# Patient Record
Sex: FEMALE | Race: WHITE | NOT HISPANIC OR LATINO | Employment: OTHER | URBAN - METROPOLITAN AREA
[De-identification: names, ages, dates, MRNs, and addresses within clinical notes are randomized per-mention and may not be internally consistent; named-entity substitution may affect disease eponyms.]

---

## 2017-12-07 ENCOUNTER — OFFICE VISIT (OUTPATIENT)
Dept: OBGYN | Facility: CLINIC | Age: 27
End: 2017-12-07
Payer: COMMERCIAL

## 2017-12-07 VITALS — BODY MASS INDEX: 24.46 KG/M2 | DIASTOLIC BLOOD PRESSURE: 74 MMHG | WEIGHT: 147 LBS | SYSTOLIC BLOOD PRESSURE: 110 MMHG

## 2017-12-07 DIAGNOSIS — Z01.419 ENCOUNTER FOR GYNECOLOGICAL EXAMINATION WITHOUT ABNORMAL FINDING: ICD-10-CM

## 2017-12-07 DIAGNOSIS — Z30.41 ENCOUNTER FOR SURVEILLANCE OF CONTRACEPTIVE PILLS: ICD-10-CM

## 2017-12-07 DIAGNOSIS — Z13.6 CARDIOVASCULAR SCREENING; LDL GOAL LESS THAN 100: Primary | ICD-10-CM

## 2017-12-07 PROCEDURE — 99395 PREV VISIT EST AGE 18-39: CPT | Performed by: OBSTETRICS & GYNECOLOGY

## 2017-12-07 RX ORDER — NORGESTIMATE AND ETHINYL ESTRADIOL 0.25-0.035
1 KIT ORAL DAILY
Qty: 84 TABLET | Refills: 3 | Status: SHIPPED | OUTPATIENT
Start: 2017-12-07 | End: 2018-06-22

## 2017-12-07 NOTE — MR AVS SNAPSHOT
After Visit Summary   12/7/2017    Seth Null    MRN: [id number]           Patient Information     Date Of Birth          1990        Visit Information        Provider Department      12/7/2017 2:30 PM Chito Ahmadi MD [facility]        Today's Diagnoses     CARDIOVASCULAR SCREENING; LDL GOAL LESS THAN 100    -  1    Encounter for surveillance of contraceptive pills        Encounter for gynecological examination without abnormal finding          Care Instructions    You can reach your [facility] Care Team any time of the day by calling [phone]. This number will put you in touch with the 24 hour nurse line if the clinic is closed.    To contact your OB/GYN Surgery Scheduler please call [phone]. This is a direct number for your care team between 8 a.m. and 4 p.m. Monday through Friday.    [facility] is open for your convenience: [phone]  Monday through Friday 8 a.m. to 8:30 p.m.  Saturday 9 a.m. to 6 p.m.  Sunday Noon to 6 p.m.    They are closed on all major holidays.              Follow-ups after your visit        Follow-up notes from your care team     Return in about 1 year (around 12/7/2018).      Your next 10 appointments already scheduled     Feb 15, 2018  2:30 PM CST   MyChart Dermatology General with Clifford Bowles MD   [facility] ([facility])    [address] [place] [address] [id number] [phone]           This appointment is used for a first time dermatology visit only to determine the best course of treatment.  There is not a guarantee the provider will be able to perform any procedures on the first visit.                Future tests that were ordered for you today     Open Future Orders        Priority Expected Expires Ordered    Lipid panel reflex to direct LDL Fasting Routine 12/7/2017 12/7/2018 12/7/2017            Who to contact     If you have questions or need  follow up information about today's clinic visit or your schedule please contact [facility] directly at [phone].  Normal or non-critical lab and imaging results will be communicated to you by MyChart, letter or phone within 4 business days after the clinic has received the results. If you do not hear from us within 7 days, please contact the clinic through [employer] or phone. If you have a critical or abnormal lab result, we will notify you by phone as soon as possible.  Submit refill requests through Nitro or call your pharmacy and they will forward the refill request to us. Please allow 3 business days for your refill to be completed.          Additional Information About Your Visit        [employer] Information     Nitro gives you secure access to your electronic health record. If you see a primary care provider, you can also send messages to your care team and make appointments. If you have questions, please call your primary care clinic.  If you do not have a primary care provider, please call [phone] and they will assist you.        Care EveryWhere ID     This is your Care EveryWhere ID. This could be used by other organizations to access your [facility] medical records  [id number]        Your Vitals Were     Last Period BMI (Body Mass Index)                11/23/2017 (Approximate) 24.46 kg/m2           Blood Pressure from Last 3 Encounters:   12/07/17 110/74   10/11/16 120/70   09/01/15 116/68    Weight from Last 3 Encounters:   12/07/17 147 lb (66.7 kg)   10/11/16 156 lb 4.8 oz (70.9 kg)   09/01/15 152 lb (68.9 kg)                 Where to get your medicines      These medications were sent to [id number] - [place], [place] - [address]  [address], [facility] [address]     Phone:  [phone]     norgestimate-ethinyl estradiol 0.25-35 MG-MCG per tablet          Primary Care Provider Office Phone # Fax #    Chito Ahmadi [phone]        [address]  [facility] [address]        Equal Access to Services     LILIAM GUPTAJUNIOR : Hadii melinda marx hadtoñoo Somadanali, waaxda luqadaha, qaybta kashelbyda hieumarcosse, jordyn beasleyherminiadarrian fox. So [facility] [phone].    ATENCIÓN: Si habla español, tiene a sotelo disposición servicios gratuitos de asistencia lingüística. Llame al [phone].    We comply with applicable federal civil rights laws and [place] laws. We do not discriminate on the basis of race, color, national origin, age, disability, sex, sexual orientation, or gender identity.            Thank you!     Thank you for choosing [facility]  for your care. Our goal is always to provide you with excellent care. Hearing back from our patients is one way we can continue to improve our services. Please take a few minutes to complete the written survey that you may receive in the mail after your visit with us. Thank you!             Your Updated Medication List - Protect others around you: Learn how to safely use, store and throw away your medicines at www.disposemymeds.org.          This list is accurate as of: 12/7/17 11:59 PM.  Always use your most recent med list.                   Brand Name Dispense Instructions for use Diagnosis    norgestimate-ethinyl estradiol 0.25-35 MG-MCG per tablet    ORTHO-CYCLEN, SPRINTEC    84 tablet    Take 1 tablet by mouth daily    Encounter for surveillance of contraceptive pills

## 2017-12-07 NOTE — NURSING NOTE
"Chief Complaint   Patient presents with     Physical       Initial /74  Wt 147 lb (66.7 kg)  LMP 2017 (Approximate)  BMI 24.46 kg/m2 Estimated body mass index is 24.46 kg/(m^2) as calculated from the following:    Height as of 9/1/15: 5' 5\" (1.651 m).    Weight as of this encounter: 147 lb (66.7 kg).  BP completed using cuff size: regular        The following HM Due: NONE      The following patient reported/Care Every where data was sent to:  P ABSTRACT QUALITY INITIATIVES [[id number]]        Susana Rod, [facility]                 "

## 2017-12-08 NOTE — PATIENT INSTRUCTIONS
You can reach your [facility] Care Team any time of the day by calling [phone]. This number will put you in touch with the 24 hour nurse line if the clinic is closed.    To contact your OB/GYN Surgery Scheduler please call [phone]. This is a direct number for your care team between 8 a.m. and 4 p.m. Monday through Friday.    [facility] is open for your convenience: [phone]  Monday through Friday 8 a.m. to 8:30 p.m.  Saturday 9 a.m. to 6 p.m.  Sunday Noon to 6 p.m.    They are closed on all major holidays.

## 2017-12-08 NOTE — PROGRESS NOTES
HPI:  Seth Null is a 27 year old white  female , oral contraceptives for contraception who presents for an annual exam and pap.  She is Doing well without concerns. Prepregnancy planning including folate issues discussed  Self breast exam,  ACS screening mammogram recs, the use of 81 mg ASA to decrease the risk of heart disease, lipid screening, colon cancer screening recs and Dexa scan recs thoroughly reveiwed.      Past Medical History:   Diagnosis Date     NO ACTIVE PROBLEMS (aka NONE)      Past Surgical History:   Procedure Laterality Date     MAMMOPLASTY REDUCTION       Family History   Problem Relation Age of Onset     Hypertension Father      CANCER Maternal Grandfather      metastatic     Social History     Social History     Marital status:      Spouse name: N/A     Number of children: N/A     Years of education: N/A     Occupational History     special  w  [employer]     Social History Main Topics     Smoking status: Never Smoker     Smokeless tobacco: Never Used     Alcohol use Yes      Comment: some     Drug use: No     Sexual activity: Yes     Partners: Male     Birth control/ protection: Condom, Pill     Other Topics Concern     Parent/Sibling W/ Cabg, Mi Or Angioplasty Before 65f 55m? No     Social History Narrative       Allergies:  Review of patient's allergies indicates no known allergies.    Current Outpatient Prescriptions   Medication Sig Dispense Refill     norgestimate-ethinyl estradiol (ORTHO-CYCLEN, SPRINTEC) 0.25-35 MG-MCG per tablet Take 1 tablet by mouth daily 84 tablet 3       ROS: ROS: 10 point ROS neg other than the symptoms noted above in the HPI.    EXAM:  Vitals: /74  Wt 147 lb (66.7 kg)  LMP 2017 (Approximate)  BMI 24.46 kg/m2  BMI= Body mass index is 24.46 kg/(m^2).  Constitutional: healthy, alert and no distress  Head: Normocephalic. No masses, lesions, tenderness or abnormalities  Neck: Neck supple. No adenopathy.  Thyroid symmetric, normal size,, Carotids without bruits.  ENT: NEGATIVE for ear, mouth and throat problems  Breast:  breasts symmetric, no dominant or suspicious mass, no skin or nipple changes, no axillary adenopathy, unchanged from previous exam or self exam in taught and encouraged  Cardiovascular: negative, PMI normal. No lifts, heaves, or thrills. RRR. No murmurs, clicks gallops or rub  Respiratory: negative, Percussion normal. Good diaphragmatic excursion. Lungs clear  Gastrointestinal: Abdomen soft, non-tender. BS normal. No masses, organomegaly  Genitourinary: Pelvic Exam:  External Genitalia:     Normal appearance for age, no discharge present, no tenderness present, no inflammatory lesions present, color normal  Vagina:     Normal vaginal vault without central or paravaginal defects, no discharge present, no inflammatory lesions present, no masses present  Bladder:     Nontender to palpation  Urethra:   Urethral Body:  Urethra palpation normal, urethra structural support normal   Urethral Meatus:  No erythema or lesions present  Cervix:     Appearance healthy, no lesions present, nontender to palpation, no bleeding present  Uterus:     Uterus: firm, normal sized and nontender, midplane in position.   Adnexa:     No adnexal tenderness present, no adnexal masses present  Perineum:     Perineum within normal limits, no evidence of trauma, no rashes or skin lesions present  Anus:     Anus within normal limits, no hemorrhoids present  Inguinal Lymph Nodes:     No lymphadenopathy present  Pubic Hair:     Normal pubic hair distribution for age  Genitalia and Groin:     No rashes present, no lesions present, no areas of discoloration, no masses present    Musculoskeletalextremities normal- no gross deformities noted, gait normal and normal muscle tone  Integument: no suspicious lesions or rashes  Neurologic: Gait normal. Reflexes normal and symmetric. Sensation grossly WNL.  Psychiatric: mentation appears normal  and affect normal/bright  Hematologic/Lymphatic/Immunologic: Normal cervical lymph nodes     ASSESSMENT:/PLAN:  (Z13.6) CARDIOVASCULAR SCREENING; LDL GOAL LESS THAN 100  (primary encounter diagnosis)  Comment: Recommendations discussed  Plan: Lipid panel reflex to direct LDL Fasting        Future order placed    (Z30.41) Encounter for surveillance of contraceptive pills  Comment: patient requests refill, indications for backup discussed  Plan: norgestimate-ethinyl estradiol (ORTHO-CYCLEN,         SPRINTEC) 0.25-35 MG-MCG per tablet        done    (Z01.419) Encounter for gynecological examination without abnormal finding  Comment: otherwise normal GYN exam  Plan: return one year or when necessary concerns arise      Chito Ahmadi M.D.    (Chart documentation was completed, in part, with [employer] (formerly [employer]) voice-recognition software. Even though reviewed, some grammatical, spelling, and word errors may remain.)

## 2017-12-21 ENCOUNTER — OFFICE VISIT (OUTPATIENT)
Dept: FAMILY MEDICINE | Facility: CLINIC | Age: 27
End: 2017-12-21
Payer: COMMERCIAL

## 2017-12-21 ENCOUNTER — RADIANT APPOINTMENT (OUTPATIENT)
Dept: GENERAL RADIOLOGY | Facility: CLINIC | Age: 27
End: 2017-12-21
Attending: FAMILY MEDICINE
Payer: COMMERCIAL

## 2017-12-21 VITALS
BODY MASS INDEX: 23.66 KG/M2 | OXYGEN SATURATION: 100 % | HEIGHT: 65 IN | TEMPERATURE: 98.8 F | DIASTOLIC BLOOD PRESSURE: 88 MMHG | WEIGHT: 142 LBS | RESPIRATION RATE: 17 BRPM | SYSTOLIC BLOOD PRESSURE: 122 MMHG | HEART RATE: 95 BPM

## 2017-12-21 DIAGNOSIS — R06.02 SOB (SHORTNESS OF BREATH): ICD-10-CM

## 2017-12-21 DIAGNOSIS — J18.9 WALKING PNEUMONIA: Primary | ICD-10-CM

## 2017-12-21 PROCEDURE — 99203 OFFICE O/P NEW LOW 30 MIN: CPT | Performed by: FAMILY MEDICINE

## 2017-12-21 PROCEDURE — 71020 XR CHEST 2 VW: CPT

## 2017-12-21 RX ORDER — ALBUTEROL SULFATE 90 UG/1
2 AEROSOL, METERED RESPIRATORY (INHALATION) EVERY 4 HOURS PRN
Qty: 1 INHALER | Refills: 1 | Status: SHIPPED | OUTPATIENT
Start: 2017-12-21 | End: 2018-02-16

## 2017-12-21 RX ORDER — AZITHROMYCIN 250 MG/1
TABLET, FILM COATED ORAL
Qty: 6 TABLET | Refills: 0 | Status: SHIPPED | OUTPATIENT
Start: 2017-12-21 | End: 2018-01-30

## 2017-12-21 NOTE — PROGRESS NOTES
"  SUBJECTIVE:   Seth Null is a 27 year old female who presents to clinic today for the following health issues:      Reports dyspnea for the past 10 days, mild cough.     Having rhinorrhea and nasal congestion. No sinus pressure or ear pain.     No fevers or chills.     No hx of allergies, asthma, bronchospasm.     No chemical exposure. Non smoker.       Problem list and histories reviewed & adjusted, as indicated.  Additional history: as documented    There is no problem list on file for this patient.    Past Surgical History:   Procedure Laterality Date     MAMMOPLASTY REDUCTION         Social History   Substance Use Topics     Smoking status: Never Smoker     Smokeless tobacco: Never Used     Alcohol use Yes      Comment: some     Family History   Problem Relation Age of Onset     Hypertension Father      CANCER Maternal Grandfather      metastatic             Reviewed and updated as needed this visit by clinical staffAllergies       Reviewed and updated as needed this visit by Provider         ROS:  Constitutional, HEENT, cardiovascular, pulmonary, gi and gu systems are negative, except as otherwise noted.      OBJECTIVE:   /88 (BP Location: Right arm, Patient Position: Sitting, Cuff Size: Adult Regular)  Pulse 95  Temp 98.8  F (37.1  C) (Oral)  Resp 17  Ht 5' 5.25\" (1.657 m)  Wt 142 lb (64.4 kg)  LMP 11/23/2017 (Approximate)  SpO2 100%  Breastfeeding? No  BMI 23.45 kg/m2  Body mass index is 23.45 kg/(m^2).  GENERAL: healthy, alert and no distress  HENT: ear canals and TM's normal, nose and mouth without ulcers or lesions  NECK: no adenopathy, no asymmetry, masses, or scars and thyroid normal to palpation  RESP: lungs clear to auscultation - no rales, rhonchi or wheezes  CV: regular rate and rhythm, normal S1 S2, no S3 or S4, no murmur, click or rub, no peripheral edema and peripheral pulses strong    Diagnostic Test Results:  CXR - negative    ASSESSMENT/PLAN:     1. Walking pneumonia - " advised follow up should symptoms fail to resolve.   - azithromycin (ZITHROMAX) 250 MG tablet; Two tablets first day, then one tablet daily for four days.  Dispense: 6 tablet; Refill: 0  - albuterol (PROAIR HFA/PROVENTIL HFA/VENTOLIN HFA) 108 (90 BASE) MCG/ACT Inhaler; Inhale 2 puffs into the lungs every 4 hours as needed  Dispense: 1 Inhaler; Refill: 1    2. SOB (shortness of breath)  - XR Chest 2 Views; Future      Natasha Koch MD  [facility]

## 2017-12-21 NOTE — NURSING NOTE
"Chief Complaint   Patient presents with     Shortness of Breath       Initial /88 (BP Location: Right arm, Patient Position: Sitting, Cuff Size: Adult Regular)  Pulse 95  Temp 98.8  F (37.1  C) (Oral)  Resp 17  Ht 5' 5.25\" (1.657 m)  Wt 142 lb (64.4 kg)  LMP 11/23/2017 (Approximate)  SpO2 100%  Breastfeeding? No  BMI 23.45 kg/m2 Estimated body mass index is 23.45 kg/(m^2) as calculated from the following:    Height as of this encounter: 5' 5.25\" (1.657 m).    Weight as of this encounter: 142 lb (64.4 kg).  Medication Reconciliation: complete     Tom Rg CMA          "

## 2017-12-21 NOTE — PATIENT INSTRUCTIONS
Walking Pneumonia (Mycoplasma Pneumonia)  What is walking pneumonia?    Pneumonia is an infection of one or both of the lungs. It's usually caused by a virus, or bacteria. Walking pneumonia is caused by a specific bacteria called mycoplasma. Pneumonia can be very serious, especially in infants, young children, and older adults. And also in those with other long-term health problems or weak immune systems. In otherwise healthy adults, pneumonia can be mild. Mycoplasma pneumonia is a mild form and is often called walking pneumonia.  What are the symptoms of walking pneumonia?  The most common symptom is a dry, hacking cough. You may also feel tired.  Other symptoms may include:    Fever    Headaches    Chills    Sweating    Chest pain    Ear pain    Sore throat  How is walking pneumonia diagnosed?  Your healthcare provider will ask about your medical history and current symptoms. He or she will also examine you. You may also have tests including:    Imaging. A chest X-ray of your chest may be done.    Lab tests. Blood tests and sputum cultures may be done.  How is walking pneumonia treated?  Since it's caused by bacteria, antibiotics are usually prescribed. However, it may also go away without any treatment.  Your healthcare provider may also recommend medicines, either prescription or over-the-counter, and other measures to relieve symptoms such as:    Acetaminophen or ibuprofen to lower your fever and lessen headache or other pain    Cough medicine to loosen mucus or reduce coughing    Bedrest or reduced activity    Increased fluids to loosen mucus and replace lost fluids from sweating  Make sure you check with your healthcare provider or pharmacist before taking any over-the-counter medicines.  What are the complications of walking pneumonia?  Although walking pneumonia is usually mild, and it often goes away without treatment, complications can occur. They include:    Severe pneumonia    Serious infections in  other parts of the body    Anemia or a low red blood cell count    Kidney problems    Skin conditions  What can I do to prevent walking pneumonia?  To prevent others from getting walking pneumonia:    Try to stay away from other people if you are coughing a lot.    Cover your mouth when you cough. It's easy to pass along this infection to other people through coughing, and contact with surfaces that you cough near.    Wipe off surfaces, including phones, remote controls, and doorknobs with antibacterial or disinfectant products.    Tell people to wash their hands if they touch things you have coughed near.    Make sure to wash your hands often. Wash them before handling food or objects that others may touch. Use a separate towel or paper towels for drying.  Date Last Reviewed: 1/1/2017 2000-2017 [employer]. [address] [place] [address], [place] [address]. All rights reserved. This information is not intended as a substitute for professional medical care. Always follow your healthcare professional's instructions.

## 2017-12-21 NOTE — MR AVS SNAPSHOT
After Visit Summary   12/21/2017    Seth Null    MRN: [id number]           Patient Information     Date Of Birth          1990        Visit Information        Provider Department      12/21/2017 4:15 PM Natasha Koch MD [facility]        Today's Diagnoses     Walking pneumonia    -  1    SOB (shortness of breath)          Care Instructions      Walking Pneumonia (Mycoplasma Pneumonia)  What is walking pneumonia?    Pneumonia is an infection of one or both of the lungs. It's usually caused by a virus, or bacteria. Walking pneumonia is caused by a specific bacteria called mycoplasma. Pneumonia can be very serious, especially in infants, young children, and older adults. And also in those with other long-term health problems or weak immune systems. In otherwise healthy adults, pneumonia can be mild. Mycoplasma pneumonia is a mild form and is often called walking pneumonia.  What are the symptoms of walking pneumonia?  The most common symptom is a dry, hacking cough. You may also feel tired.  Other symptoms may include:    Fever    Headaches    Chills    Sweating    Chest pain    Ear pain    Sore throat  How is walking pneumonia diagnosed?  Your healthcare provider will ask about your medical history and current symptoms. He or she will also examine you. You may also have tests including:    Imaging. A chest X-ray of your chest may be done.    Lab tests. Blood tests and sputum cultures may be done.  How is walking pneumonia treated?  Since it's caused by bacteria, antibiotics are usually prescribed. However, it may also go away without any treatment.  Your healthcare provider may also recommend medicines, either prescription or over-the-counter, and other measures to relieve symptoms such as:    Acetaminophen or ibuprofen to lower your fever and lessen headache or other pain    Cough medicine to loosen mucus or reduce coughing    Bedrest or reduced activity    Increased  fluids to loosen mucus and replace lost fluids from sweating  Make sure you check with your healthcare provider or pharmacist before taking any over-the-counter medicines.  What are the complications of walking pneumonia?  Although walking pneumonia is usually mild, and it often goes away without treatment, complications can occur. They include:    Severe pneumonia    Serious infections in other parts of the body    Anemia or a low red blood cell count    Kidney problems    Skin conditions  What can I do to prevent walking pneumonia?  To prevent others from getting walking pneumonia:    Try to stay away from other people if you are coughing a lot.    Cover your mouth when you cough. It's easy to pass along this infection to other people through coughing, and contact with surfaces that you cough near.    Wipe off surfaces, including phones, remote controls, and doorknobs with antibacterial or disinfectant products.    Tell people to wash their hands if they touch things you have coughed near.    Make sure to wash your hands often. Wash them before handling food or objects that others may touch. Use a separate towel or paper towels for drying.  Date Last Reviewed: 1/1/2017 2000-2017 [employer]. [address] [place] [address]. All rights reserved. This information is not intended as a substitute for professional medical care. Always follow your healthcare professional's instructions.                Follow-ups after your visit        Your next 10 appointments already scheduled     Feb 15, 2018  2:30 PM CST   MyChart Dermatology General with Clifford Bowles MD   [facility]    [address] [address] [phone]           This appointment is used for a first time dermatology visit only to determine the best course of treatment.  There is not a guarantee the provider will be able to perform any  "procedures on the first visit.                Who to contact     If you have questions or need follow up information about today's clinic visit or your schedule please contact [facility] directly at [phone].  Normal or non-critical lab and imaging results will be communicated to you by MyChart, letter or phone within 4 business days after the clinic has received the results. If you do not hear from us within 7 days, please contact the clinic through SETVIhart or phone. If you have a critical or abnormal lab result, we will notify you by phone as soon as possible.  Submit refill requests through DearJane or call your pharmacy and they will forward the refill request to us. Please allow 3 business days for your refill to be completed.          Additional Information About Your Visit        SET[employer] Information     DearJane gives you secure access to your electronic health record. If you see a primary care provider, you can also send messages to your care team and make appointments. If you have questions, please call your primary care clinic.  If you do not have a primary care provider, please call [phone] and they will assist you.        Care EveryWhere ID     This is your Care EveryWhere ID. This could be used by other organizations to access your [place] medical records  [id number]        Your Vitals Were     Pulse Temperature Respirations Height Last Period Pulse Oximetry    95 98.8  F (37.1  C) (Oral) 17 5' 5.25\" (1.657 m) 11/23/2017 (Approximate) 100%    Breastfeeding? BMI (Body Mass Index)                No 23.45 kg/m2           Blood Pressure from Last 3 Encounters:   12/21/17 122/88   12/07/17 110/74   10/11/16 120/70    Weight from Last 3 Encounters:   12/21/17 142 lb (64.4 kg)   12/07/17 147 lb (66.7 kg)   10/11/16 156 lb 4.8 oz (70.9 kg)                 Today's Medication Changes          These changes are accurate as of: 12/21/17  4:48 PM.  If you have any questions, ask your nurse " or doctor.               Start taking these medicines.        Dose/Directions    albuterol 108 (90 BASE) MCG/ACT Inhaler   Commonly known as:  PROAIR HFA/PROVENTIL HFA/VENTOLIN HFA   Used for:  Walking pneumonia   Started by:  Natasha Koch MD        Dose:  2 puff   Inhale 2 puffs into the lungs every 4 hours as needed   Quantity:  1 Inhaler   Refills:  1       azithromycin 250 MG tablet   Commonly known as:  ZITHROMAX   Used for:  Walking pneumonia   Started by:  Natasha Koch MD        Two tablets first day, then one tablet daily for four days.   Quantity:  6 tablet   Refills:  0            Where to get your medicines      These medications were sent to [id number] - [place], [place] - [address]  [address] [facility], [facility] [address]     Phone:  [phone]     albuterol 108 (90 BASE) MCG/ACT Inhaler    azithromycin 250 MG tablet                Primary Care Provider Office Phone # Fax #    Chito Ahmadi [phone]       [address]  [facility] [address]        Equal Access to Services     [facility]: Hadii aad ku hadasho Soomaali, waaxda luqadaha, qaybta kaalmada adeegyada, waxay taurus torre . So [facility] [phone].    ATENCIÓN: Si habla español, tiene a sotelo disposición servicios gratuitos de asistencia lingüística. [facility] [phone].    We comply with applicable federal civil rights laws and [place] laws. We do not discriminate on the basis of race, color, national origin, age, disability, sex, sexual orientation, or gender identity.            Thank you!     Thank you for choosing [facility]  for your care. Our goal is always to provide you with excellent care. Hearing back from our patients is one way we can continue to improve our services. Please take a few minutes to complete the written survey that you may receive in the mail after your visit with us. Thank you!             Your Updated  Medication List - Protect others around you: Learn how to safely use, store and throw away your medicines at www.disposemymeds.org.          This list is accurate as of: 12/21/17  4:48 PM.  Always use your most recent med list.                   Brand Name Dispense Instructions for use Diagnosis    albuterol 108 (90 BASE) MCG/ACT Inhaler    PROAIR HFA/PROVENTIL HFA/VENTOLIN HFA    1 Inhaler    Inhale 2 puffs into the lungs every 4 hours as needed    Walking pneumonia       azithromycin 250 MG tablet    ZITHROMAX    6 tablet    Two tablets first day, then one tablet daily for four days.    Walking pneumonia       norgestimate-ethinyl estradiol 0.25-35 MG-MCG per tablet    ORTHO-CYCLEN, SPRINTEC    84 tablet    Take 1 tablet by mouth daily    Encounter for surveillance of contraceptive pills

## 2018-02-02 ENCOUNTER — TELEPHONE (OUTPATIENT)
Dept: FAMILY MEDICINE | Facility: CLINIC | Age: 28
End: 2018-02-02

## 2018-02-02 ENCOUNTER — OFFICE VISIT (OUTPATIENT)
Dept: FAMILY MEDICINE | Facility: CLINIC | Age: 28
End: 2018-02-02
Payer: COMMERCIAL

## 2018-02-02 VITALS
HEART RATE: 93 BPM | TEMPERATURE: 98.6 F | DIASTOLIC BLOOD PRESSURE: 78 MMHG | OXYGEN SATURATION: 99 % | HEIGHT: 65 IN | WEIGHT: 145 LBS | BODY MASS INDEX: 24.16 KG/M2 | SYSTOLIC BLOOD PRESSURE: 114 MMHG

## 2018-02-02 DIAGNOSIS — R06.00 DYSPNEA, UNSPECIFIED TYPE: Primary | ICD-10-CM

## 2018-02-02 DIAGNOSIS — J06.9 VIRAL URI WITH COUGH: ICD-10-CM

## 2018-02-02 DIAGNOSIS — R06.00 DYSPNEA, UNSPECIFIED TYPE: ICD-10-CM

## 2018-02-02 LAB
FEF 25/75: 3.09
FEV-1: 3.45
FEV1/FVC: NORMAL
FVC: 4.53

## 2018-02-02 PROCEDURE — 99214 OFFICE O/P EST MOD 30 MIN: CPT | Mod: 25 | Performed by: FAMILY MEDICINE

## 2018-02-02 PROCEDURE — 94010 BREATHING CAPACITY TEST: CPT | Performed by: FAMILY MEDICINE

## 2018-02-02 NOTE — NURSING NOTE
"Chief Complaint   Patient presents with     URI       Initial /78 (BP Location: Right arm, Patient Position: Chair, Cuff Size: Adult Regular)  Pulse 93  Temp 98.6  F (37  C) (Oral)  Ht 5' 5.25\" (1.657 m)  Wt 145 lb (65.8 kg)  SpO2 99%  Breastfeeding? No  BMI 23.94 kg/m2 Estimated body mass index is 23.94 kg/(m^2) as calculated from the following:    Height as of this encounter: 5' 5.25\" (1.657 m).    Weight as of this encounter: 145 lb (65.8 kg).  Medication Reconciliation: complete     Tom Rg CMA          "

## 2018-02-02 NOTE — PROGRESS NOTES
"  SUBJECTIVE:   Seth Null is a 27 year old female who presents to clinic today for the following health issues:      RESPIRATORY SYMPTOMS      Duration: one week    Description  cough and SOB    Severity: moderate    Accompanying signs and symptoms: None    History (predisposing factors):  Recent walking pneumonia    Precipitating or alleviating factors: worse at night    Therapies tried and outcome:  otc not helping      Having dyspnea, similar to when she came to see me in mid December. Was treated for walking PNA at that time, symptoms fully resolved. Then 1 week ago, symptoms returned but shortly following onset of cold symptoms - nasal congestion, sore throat, ear pressure and cough.     Albuterol not too helpful.     Breathing and cough worse overnight. No fevers.       No hx of asthma. Non smoker. Outside of past 2 months, no previous issues with breathing.         Problem list and histories reviewed & adjusted, as indicated.  Additional history: none    There is no problem list on file for this patient.    Past Surgical History:   Procedure Laterality Date     MAMMOPLASTY REDUCTION         Social History   Substance Use Topics     Smoking status: Never Smoker     Smokeless tobacco: Never Used     Alcohol use Yes      Comment: some     Family History   Problem Relation Age of Onset     Hypertension Father      CANCER Maternal Grandfather      metastatic           Reviewed and updated as needed this visit by clinical staff  Allergies       Reviewed and updated as needed this visit by Provider         ROS:  Constitutional, HEENT, cardiovascular, pulmonary, gi and gu systems are negative, except as otherwise noted.    OBJECTIVE:     /78 (BP Location: Right arm, Patient Position: Chair, Cuff Size: Adult Regular)  Pulse 93  Temp 98.6  F (37  C) (Oral)  Ht 5' 5.25\" (1.657 m)  Wt 145 lb (65.8 kg)  SpO2 99%  Breastfeeding? No  BMI 23.94 kg/m2  Body mass index is 23.94 kg/(m^2).  GENERAL: healthy, " alert and no distress  NECK: no adenopathy, no asymmetry, masses, or scars and thyroid normal to palpation  RESP: lungs clear to auscultation - no rales, rhonchi or wheezes  CV: regular rate and rhythm, normal S1 S2, no S3 or S4, no murmur, click or rub, no peripheral edema and peripheral pulses strong    Diagnostic Test Results:  Spirometry  FEV1 all > 100% predicted  FEV1/FVC > 89%      ASSESSMENT/PLAN:     1. Dyspnea, unspecified type - discussed likely still struggling with some inflammation from previous illness - now with this illness, suspect magnified her symptoms. Suggested adding an ICS to help calm the airways, call if persistent symptoms. Considered other etiologies including PE versus cardiac dysfunction - HR in NR, had full resolution of symptoms of symptoms in between illnesses, making both less likely.   - Spirometry, Breathing Capacity: Normal Order, Clinic Performed    2. Viral URI with cough     25 minutes spent with patient face-to-face, > 50% in counseling and coordination of care regarding the issues addressed in the assessment and plan.     Natasha Koch MD  [facility]

## 2018-02-02 NOTE — TELEPHONE ENCOUNTER
Ted hernandez [facility] is calling to clarify the script, states two puffs a day on the inhaler will only last patient 2 weeks. Please call [phone].

## 2018-02-02 NOTE — MR AVS SNAPSHOT
After Visit Summary   2/2/2018    Seth Null    MRN: [id number]           Patient Information     Date Of Birth          1990        Visit Information        Provider Department      2/2/2018 8:00 AM Natasha Koch MD [facility]        Today's Diagnoses     Dyspnea, unspecified type    -  1    Viral URI with cough           Follow-ups after your visit        Your next 10 appointments already scheduled     Feb 15, 2018  2:30 PM CST   Amy Dermatology General with Clifford Bowles MD   [facility] ([facility]    [address] [id number] [phone]           This appointment is used for a first time dermatology visit only to determine the best course of treatment.  There is not a guarantee the provider will be able to perform any procedures on the first visit.                Who to contact     If you have questions or need follow up information about today's clinic visit or your schedule please contact [facility] directly at [phone].  Normal or non-critical lab and imaging results will be communicated to you by MyChart, letter or phone within 4 business days after the clinic has received the results. If you do not hear from us within 7 days, please contact the clinic through [email or web address] or phone. If you have a critical or abnormal lab result, we will notify you by phone as soon as possible.  Submit refill requests through Qustreet or call your pharmacy and they will forward the refill request to us. Please allow 3 business days for your refill to be completed.          Additional Information About Your Visit        MyChart Information     Qustreet gives you secure access to your electronic health record. If you see a primary care provider, you can also send messages to your care team and make appointments. If you have questions, please call your primary care clinic.  If you  "do not have a primary care provider, please call [phone] and they will assist you.        Care EveryWhere ID     This is your Care EveryWhere ID. This could be used by other organizations to access your [place] medical records  [id number]        Your Vitals Were     Pulse Temperature Height Pulse Oximetry Breastfeeding? BMI (Body Mass Index)    93 98.6  F (37  C) (Oral) 5' 5.25\" (1.657 m) 99% No 23.94 kg/m2       Blood Pressure from Last 3 Encounters:   02/02/18 114/78   12/21/17 122/88   12/07/17 110/74    Weight from Last 3 Encounters:   02/02/18 145 lb (65.8 kg)   12/21/17 142 lb (64.4 kg)   12/07/17 147 lb (66.7 kg)              We Performed the Following     Spirometry, Breathing Capacity: Normal Order, Clinic Performed          Today's Medication Changes          These changes are accurate as of 2/2/18  9:07 AM.  If you have any questions, ask your nurse or doctor.               Start taking these medicines.        Dose/Directions    BUDESONIDE (INHALATION) 90 MCG/ACT Aepb   Used for:  Dyspnea, unspecified type   Started by:  Natasha Koch MD        Dose:  2 puff   Inhale 2 puffs into the lungs 2 times daily   Quantity:  1 each   Refills:  1            Where to get your medicines      These medications were sent to [id number] [place], [place] - [address] [facility]  [address] [facility] [address]     Phone:  [phone]     BUDESONIDE (INHALATION) 90 MCG/ACT Aepb                Primary Care Provider Office Phone # Fax #    Natasha Koch [phone] [address]  [facility] [address]        Equal Access to Services     JUANCHO GARIBAY : Sridevi Arias, wafelix voss, qadaylinta kaalmada lisa, jordyn fox. So [facility] [phone].    ATENCIÓN: Si habla español, tiene a sotelo disposición servicios gratuitos de asistencia lingüística. Geetha al [phone].    We comply with applicable federal civil rights " laws and [place] laws. We do not discriminate on the basis of race, color, national origin, age, disability, sex, sexual orientation, or gender identity.            Thank you!     Thank you for choosing [facility]  for your care. Our goal is always to provide you with excellent care. Hearing back from our patients is one way we can continue to improve our services. Please take a few minutes to complete the written survey that you may receive in the mail after your visit with us. Thank you!             Your Updated Medication List - Protect others around you: Learn how to safely use, store and throw away your medicines at www.disposemymeds.org.          This list is accurate as of 2/2/18  9:07 AM.  Always use your most recent med list.                   Brand Name Dispense Instructions for use Diagnosis    albuterol 108 (90 BASE) MCG/ACT Inhaler    PROAIR HFA/PROVENTIL HFA/VENTOLIN HFA    1 Inhaler    Inhale 2 puffs into the lungs every 4 hours as needed    Walking pneumonia       BUDESONIDE (INHALATION) 90 MCG/ACT Aepb     1 each    Inhale 2 puffs into the lungs 2 times daily    Dyspnea, unspecified type       norgestimate-ethinyl estradiol 0.25-35 MG-MCG per tablet    ORTHO-CYCLEN, SPRINTEC    84 tablet    Take 1 tablet by mouth daily    Encounter for surveillance of contraceptive pills

## 2018-02-15 ENCOUNTER — OFFICE VISIT (OUTPATIENT)
Dept: DERMATOLOGY | Facility: CLINIC | Age: 28
End: 2018-02-15
Payer: COMMERCIAL

## 2018-02-15 VITALS — HEART RATE: 97 BPM | DIASTOLIC BLOOD PRESSURE: 81 MMHG | OXYGEN SATURATION: 97 % | SYSTOLIC BLOOD PRESSURE: 119 MMHG

## 2018-02-15 DIAGNOSIS — L91.8 ST (SKIN TAG): Primary | ICD-10-CM

## 2018-02-15 PROCEDURE — 11100 HC BIOPSY SKIN/SUBQ/MUC MEM, SINGLE LESION: CPT | Performed by: DERMATOLOGY

## 2018-02-15 PROCEDURE — 88305 TISSUE EXAM BY PATHOLOGIST: CPT | Performed by: DERMATOLOGY

## 2018-02-15 PROCEDURE — 99203 OFFICE O/P NEW LOW 30 MIN: CPT | Mod: 25 | Performed by: DERMATOLOGY

## 2018-02-15 NOTE — MR AVS SNAPSHOT
After Visit Summary   2/15/2018    Seth Null    MRN: [id number]           Patient Information     Date Of Birth          1990        Visit Information        Provider Department      2/15/2018 2:30 PM Clifford Bowles MD [facility]        Today's Diagnoses     ST (skin tag)    -  1      Care Instructions          Wound Care Instructions     FOR SUPERFICIAL WOUNDS     [facility] [phone]    [facility] [phone]                       AFTER 24 HOURS YOU SHOULD REMOVE THE BANDAGE AND BEGIN DAILY DRESSING CHANGES AS FOLLOWS:     1) Remove Dressing.     2) Clean and dry the area with tap water using a Q-tip or sterile gauze pad.     3) Apply Vaseline, Aquaphor, Polysporin ointment or Bacitracin ointment over entire wound.  Do NOT use Neosporin ointment.     4) Cover the wound with a band-aid, or a sterile non-stick gauze pad and micropore paper tape      REPEAT THESE INSTRUCTIONS AT LEAST ONCE A DAY UNTIL THE WOUND HAS COMPLETELY HEALED.    It is an old wives tale that a wound heals better when it is exposed to air and allowed to dry out. The wound will heal faster with a better cosmetic result if it is kept moist with ointment and covered with a bandage.    **Do not let the wound dry out.**      Supplies Needed:      *Cotton tipped applicators (Q-tips)    *Polysporin Ointment or Bacitracin Ointment (NOT NEOSPORIN)    *Band-aids or non-stick gauze pads and micropore paper tape.      PATIENT INFORMATION:    During the healing process you will notice a number of changes. All wounds develop a small halo of redness surrounding the wound.  This means healing is occurring. Severe itching with extensive redness usually indicates sensitivity to the ointment or bandage tape used to dress the wound.  You should call our office if this develops.      Swelling  and/or discoloration around your surgical site is common, particularly when performed  around the eye.    All wounds normally drain.  The larger the wound the more drainage there will be.  After 7-10 days, you will notice the wound beginning to shrink and new skin will begin to grow.  The wound is healed when you can see skin has formed over the entire area.  A healed wound has a healthy, shiny look to the surface and is red to dark pink in color to normalize.  Wounds may take approximately 4-6 weeks to heal.  Larger wounds may take 6-8 weeks.  After the wound is healed you may discontinue dressing changes.    You may experience a sensation of tightness as your wound heals. This is normal and will gradually subside.    Your healed wound may be sensitive to temperature changes. This sensitivity improves with time, but if you re having a lot of discomfort, try to avoid temperature extremes.    Patients frequently experience itching after their wound appears to have healed because of the continue healing under the skin.  Plain Vaseline will help relieve the itching.        POSSIBLE COMPLICATIONS    BLEEDIN. Leave the bandage in place.  2. Use tightly rolled up gauze or a cloth to apply direct pressure over the bandage for 30  minutes.  3. Reapply pressure for an additional 30 minutes if necessary  4. Use additional gauze and tape to maintain pressure once the bleeding has stopped.            Follow-ups after your visit        Your next 10 appointments already scheduled     Feb 15, 2018  2:30 PM [facility] with Clifford Bowles MD   [facility]    [address] [id number] [phone]           This appointment is used for a first time dermatology visit only to determine the best course of treatment.  There is not a guarantee the provider will be able to perform any procedures on the first visit.              2018  7:40 AM CST   MyChart Short with Natasha Koch MD    [facility]    [address] [facility] [id number] [phone]              Who to contact     If you have questions or need follow up information about today's clinic visit or your schedule please contact [facility] directly at [phone].  Normal or non-critical lab and imaging results will be communicated to you by MyChart, letter or phone within 4 business days after the clinic has received the results. If you do not hear from us within 7 days, please contact the clinic through MyChart or phone. If you have a critical or abnormal lab result, we will notify you by phone as soon as possible.  Submit refill requests through [facility] or call your pharmacy and they will forward the refill request to us. Please allow 3 business days for your refill to be completed.          Additional Information About Your Visit        POTATOSOFThart Information     [facility] gives you secure access to your electronic health record. If you see a primary care provider, you can also send messages to your care team and make appointments. If you have questions, please call your primary care clinic.  If you do not have a primary care provider, please call [phone] and they will assist you.        Care EveryWhere ID     This is your Care EveryWhere ID. This could be used by other organizations to access your [facility] medical records  [id number]         Blood Pressure from Last 3 Encounters:   02/02/18 114/78   12/21/17 122/88   12/07/17 110/74    Weight from Last 3 Encounters:   02/02/18 65.8 kg (145 lb)   12/21/17 64.4 kg (142 lb)   12/07/17 66.7 kg (147 lb)              We Performed the Following     BIOPSY SKIN/SUBQ/MUC MEM, SINGLE LESION     Surgical pathology exam        Primary Care Provider Office Phone # Fax #    Natasha Koch [phone] [address] [facility] [address]        Equal Access to Services     LILIAM GARIBAY  AH: Hadii melinda elizabethlizbet Somadanali, waaxda luqadaha, qaybta kaaljayce gonzalez, jordyn taurus ceciliaaria hagen chris yelitza fox. So [facility] [phone].    ATENCIÓN: Si habla español, tiene a sotelo disposición servicios gratuitos de asistencia lingüística. Llame al [phone].    We comply with applicable federal civil rights laws and [place] laws. We do not discriminate on the basis of race, color, national origin, age, disability, sex, sexual orientation, or gender identity.            Thank you!     Thank you for choosing [facility]  for your care. Our goal is always to provide you with excellent care. Hearing back from our patients is one way we can continue to improve our services. Please take a few minutes to complete the written survey that you may receive in the mail after your visit with us. Thank you!             Your Updated Medication List - Protect others around you: Learn how to safely use, store and throw away your medicines at www.disposemymeds.org.          This list is accurate as of 2/15/18  2:26 PM.  Always use your most recent med list.                   Brand Name Dispense Instructions for use Diagnosis    albuterol 108 (90 BASE) MCG/ACT Inhaler    PROAIR HFA/PROVENTIL HFA/VENTOLIN HFA    1 Inhaler    Inhale 2 puffs into the lungs every 4 hours as needed    Walking pneumonia       budesonide 180 MCG/ACT inhaler    PULMICORT FLEXHALER    1 Inhaler    Inhale 2 puffs into the lungs 2 times daily    Dyspnea, unspecified type       norgestimate-ethinyl estradiol 0.25-35 MG-MCG per tablet    ORTHO-CYCLEN, SPRINTEC    84 tablet    Take 1 tablet by mouth daily    Encounter for surveillance of contraceptive pills

## 2018-02-15 NOTE — LETTER
2/15/2018         RE: Seth Null  [id number] [facility] [address]        Dear Colleague,    Thank you for referring your patient, Seth Null, to the [facility]. Please see a copy of my visit note below.    Seth Null is a 27 year old year old female patient here today for spot on thigh.   .  Patient states this has been present for years.  Patient reports the following symptoms:  Caught on clothing.   Patient reports the following previous treatments none.  Patient reports the following modifying factors none.  Associated symptoms: none.  Patient has no other skin complaints today.  Remainder of the HPI, Meds, PMH, Allergies, FH, and SH was reviewed in chart.      Past Medical History:   Diagnosis Date     NO ACTIVE PROBLEMS (aka NONE)        Past Surgical History:   Procedure Laterality Date     MAMMOPLASTY REDUCTION          Family History   Problem Relation Age of Onset     Hypertension Father      CANCER Maternal Grandfather      metastatic       Social History     Social History     Marital status:      Spouse name: N/A     Number of children: N/A     Years of education: N/A     Occupational History     special  w [employer]     Social History Main Topics     Smoking status: Never Smoker     Smokeless tobacco: Never Used     Alcohol use Yes      Comment: some     Drug use: No     Sexual activity: Yes     Partners: Male     Birth control/ protection: Condom, Pill     Other Topics Concern     Parent/Sibling W/ Cabg, Mi Or Angioplasty Before 65f 55m? No     Social History Narrative       Outpatient Encounter Prescriptions as of 2/15/2018   Medication Sig Dispense Refill     budesonide (PULMICORT FLEXHALER) 180 MCG/ACT inhaler Inhale 2 puffs into the lungs 2 times daily 1 Inhaler 1     albuterol (PROAIR HFA/PROVENTIL HFA/VENTOLIN HFA) 108 (90 BASE) MCG/ACT Inhaler Inhale 2 puffs into the lungs every 4 hours as needed 1 Inhaler 1      norgestimate-ethinyl estradiol (ORTHO-CYCLEN, SPRINTEC) 0.25-35 MG-MCG per tablet Take 1 tablet by mouth daily 84 tablet 3     No facility-administered encounter medications on file as of 2/15/2018.              Review Of Systems  Skin: As above  Eyes: negative  Ears/Nose/Throat: negative  Respiratory: No shortness of breath, dyspnea on exertion, cough, or hemoptysis  Cardiovascular: negative  Gastrointestinal: negative  Genitourinary: negative  Musculoskeletal: negative  Neurologic: negative  Psychiatric: negative  Hematologic/Lymphatic/Immunologic: negative  Endocrine: negative      O:   NAD, WDWN, Alert & Oriented, Mood & Affect wnl, Vitals stable   Here today alone   There were no vitals taken for this visit.   General appearance normal   Vitals stable   Alert, oriented and in no acute distress     L lat thigh pink pedunculated papule           Eyes: Conjunctivae/lids:Normal     ENT: Lips, buccal mucosa, tongue: normal    MSK:Normal    Cardiovascular: peripheral edema none    Pulm: Breathing Normal    Neuro/Psych: Orientation:Normal; Mood/Affect:Normal      A/P:  1. L lat thigh r/o neurofibroma v tag  TANGENTIAL BIOPSY SENT OUT:  After consent, anesthesia with LEC and prep, tangential excision performed and specimen sent out for permanent section histology.  No complications and routine wound care. Patient told to call our office in 1-2 weeks for result.      BENIGN LESIONS DISCUSSED WITH PATIENT:  I discussed the specifics of tumor, prognosis, and genetics of benign lesions.  I explained that treatment of these lesions would be purely cosmetic and not medically neccessary.  I discussed with patient different removal options including excision, cautery and /or laser.        Again, thank you for allowing me to participate in the care of your patient.        Sincerely,        Clifford Bowles MD

## 2018-02-15 NOTE — PATIENT INSTRUCTIONS
Wound Care Instructions     FOR SUPERFICIAL WOUNDS     [facility] [phone]    [facility] [phone]                       AFTER 24 HOURS YOU SHOULD REMOVE THE BANDAGE AND BEGIN DAILY DRESSING CHANGES AS FOLLOWS:     1) Remove Dressing.     2) Clean and dry the area with tap water using a Q-tip or sterile gauze pad.     3) Apply Vaseline, Aquaphor, Polysporin ointment or Bacitracin ointment over entire wound.  Do NOT use Neosporin ointment.     4) Cover the wound with a band-aid, or a sterile non-stick gauze pad and micropore paper tape      REPEAT THESE INSTRUCTIONS AT LEAST ONCE A DAY UNTIL THE WOUND HAS COMPLETELY HEALED.    It is an old wives tale that a wound heals better when it is exposed to air and allowed to dry out. The wound will heal faster with a better cosmetic result if it is kept moist with ointment and covered with a bandage.    **Do not let the wound dry out.**      Supplies Needed:      *Cotton tipped applicators (Q-tips)    *Polysporin Ointment or Bacitracin Ointment (NOT NEOSPORIN)    *Band-aids or non-stick gauze pads and micropore paper tape.      PATIENT INFORMATION:    During the healing process you will notice a number of changes. All wounds develop a small halo of redness surrounding the wound.  This means healing is occurring. Severe itching with extensive redness usually indicates sensitivity to the ointment or bandage tape used to dress the wound.  You should call our office if this develops.      Swelling  and/or discoloration around your surgical site is common, particularly when performed around the eye.    All wounds normally drain.  The larger the wound the more drainage there will be.  After 7-10 days, you will notice the wound beginning to shrink and new skin will begin to grow.  The wound is healed when you can see skin has formed over the entire area.  A healed wound has a healthy, shiny look to the surface and is red to dark pink in color  to normalize.  Wounds may take approximately 4-6 weeks to heal.  Larger wounds may take 6-8 weeks.  After the wound is healed you may discontinue dressing changes.    You may experience a sensation of tightness as your wound heals. This is normal and will gradually subside.    Your healed wound may be sensitive to temperature changes. This sensitivity improves with time, but if you re having a lot of discomfort, try to avoid temperature extremes.    Patients frequently experience itching after their wound appears to have healed because of the continue healing under the skin.  Plain Vaseline will help relieve the itching.        POSSIBLE COMPLICATIONS    BLEEDIN. Leave the bandage in place.  2. Use tightly rolled up gauze or a cloth to apply direct pressure over the bandage for 30  minutes.  3. Reapply pressure for an additional 30 minutes if necessary  4. Use additional gauze and tape to maintain pressure once the bleeding has stopped.

## 2018-02-15 NOTE — PROGRESS NOTES
Seth Null is a 27 year old year old female patient here today for spot on thigh.   .  Patient states this has been present for years.  Patient reports the following symptoms:  Caught on clothing.   Patient reports the following previous treatments none.  Patient reports the following modifying factors none.  Associated symptoms: none.  Patient has no other skin complaints today.  Remainder of the HPI, Meds, PMH, Allergies, FH, and SH was reviewed in chart.      Past Medical History:   Diagnosis Date     NO ACTIVE PROBLEMS (aka NONE)        Past Surgical History:   Procedure Laterality Date     MAMMOPLASTY REDUCTION          Family History   Problem Relation Age of Onset     Hypertension Father      CANCER Maternal Grandfather      metastatic       Social History     Social History     Marital status:      Spouse name: N/A     Number of children: N/A     Years of education: N/A     Occupational History     special  w [employer]     Social History Main Topics     Smoking status: Never Smoker     Smokeless tobacco: Never Used     Alcohol use Yes      Comment: some     Drug use: No     Sexual activity: Yes     Partners: Male     Birth control/ protection: Condom, Pill     Other Topics Concern     Parent/Sibling W/ Cabg, Mi Or Angioplasty Before 65f 55m? No     Social History Narrative       Outpatient Encounter Prescriptions as of 2/15/2018   Medication Sig Dispense Refill     budesonide (PULMICORT FLEXHALER) 180 MCG/ACT inhaler Inhale 2 puffs into the lungs 2 times daily 1 Inhaler 1     albuterol (PROAIR HFA/PROVENTIL HFA/VENTOLIN HFA) 108 (90 BASE) MCG/ACT Inhaler Inhale 2 puffs into the lungs every 4 hours as needed 1 Inhaler 1     norgestimate-ethinyl estradiol (ORTHO-CYCLEN, SPRINTEC) 0.25-35 MG-MCG per tablet Take 1 tablet by mouth daily 84 tablet 3     No facility-administered encounter medications on file as of 2/15/2018.              Review Of Systems  Skin: As  above  Eyes: negative  Ears/Nose/Throat: negative  Respiratory: No shortness of breath, dyspnea on exertion, cough, or hemoptysis  Cardiovascular: negative  Gastrointestinal: negative  Genitourinary: negative  Musculoskeletal: negative  Neurologic: negative  Psychiatric: negative  Hematologic/Lymphatic/Immunologic: negative  Endocrine: negative      O:   NAD, WDWN, Alert & Oriented, Mood & Affect wnl, Vitals stable   Here today alone   There were no vitals taken for this visit.   General appearance normal   Vitals stable   Alert, oriented and in no acute distress     L lat thigh pink pedunculated papule           Eyes: Conjunctivae/lids:Normal     ENT: Lips, buccal mucosa, tongue: normal    MSK:Normal    Cardiovascular: peripheral edema none    Pulm: Breathing Normal    Neuro/Psych: Orientation:Normal; Mood/Affect:Normal      A/P:  1. L lat thigh r/o neurofibroma v tag  TANGENTIAL BIOPSY SENT OUT:  After consent, anesthesia with LEC and prep, tangential excision performed and specimen sent out for permanent section histology.  No complications and routine wound care. Patient told to call our office in 1-2 weeks for result.      BENIGN LESIONS DISCUSSED WITH PATIENT:  I discussed the specifics of tumor, prognosis, and genetics of benign lesions.  I explained that treatment of these lesions would be purely cosmetic and not medically neccessary.  I discussed with patient different removal options including excision, cautery and /or laser.

## 2018-02-15 NOTE — NURSING NOTE
"Chief Complaint   Patient presents with     Skin Tags     left lateral thigh       Initial /81  Pulse 97  SpO2 97% Estimated body mass index is 23.94 kg/(m^2) as calculated from the following:    Height as of 2/2/18: 1.657 m (5' 5.25\").    Weight as of 2/2/18: 65.8 kg (145 lb).  Medication Reconciliation: complete    "

## 2018-02-16 ENCOUNTER — OFFICE VISIT (OUTPATIENT)
Dept: FAMILY MEDICINE | Facility: CLINIC | Age: 28
End: 2018-02-16
Payer: COMMERCIAL

## 2018-02-16 VITALS
BODY MASS INDEX: 24.16 KG/M2 | HEART RATE: 101 BPM | SYSTOLIC BLOOD PRESSURE: 110 MMHG | HEIGHT: 65 IN | WEIGHT: 145 LBS | TEMPERATURE: 98.4 F | DIASTOLIC BLOOD PRESSURE: 76 MMHG | OXYGEN SATURATION: 100 %

## 2018-02-16 DIAGNOSIS — R06.00 DYSPNEA, UNSPECIFIED TYPE: Primary | ICD-10-CM

## 2018-02-16 LAB
BASOPHILS # BLD AUTO: 0 10E9/L (ref 0–0.2)
BASOPHILS NFR BLD AUTO: 0.6 %
CRP SERPL-MCNC: <2.9 MG/L (ref 0–8)
D DIMER PPP FEU-MCNC: 0.2 UG/ML FEU (ref 0–0.5)
DIFFERENTIAL METHOD BLD: NORMAL
EOSINOPHIL # BLD AUTO: 0.1 10E9/L (ref 0–0.7)
EOSINOPHIL NFR BLD AUTO: 1 %
ERYTHROCYTE [DISTWIDTH] IN BLOOD BY AUTOMATED COUNT: 13.9 % (ref 10–15)
ERYTHROCYTE [SEDIMENTATION RATE] IN BLOOD BY WESTERGREN METHOD: 5 MM/H (ref 0–20)
HCT VFR BLD AUTO: 43.5 % (ref 35–47)
HGB BLD-MCNC: 14.6 G/DL (ref 11.7–15.7)
LYMPHOCYTES # BLD AUTO: 1.9 10E9/L (ref 0.8–5.3)
LYMPHOCYTES NFR BLD AUTO: 39.5 %
MCH RBC QN AUTO: 29.1 PG (ref 26.5–33)
MCHC RBC AUTO-ENTMCNC: 33.6 G/DL (ref 31.5–36.5)
MCV RBC AUTO: 87 FL (ref 78–100)
MONOCYTES # BLD AUTO: 0.4 10E9/L (ref 0–1.3)
MONOCYTES NFR BLD AUTO: 7.8 %
NEUTROPHILS # BLD AUTO: 2.5 10E9/L (ref 1.6–8.3)
NEUTROPHILS NFR BLD AUTO: 51.1 %
PLATELET # BLD AUTO: 297 10E9/L (ref 150–450)
RBC # BLD AUTO: 5.01 10E12/L (ref 3.8–5.2)
WBC # BLD AUTO: 4.9 10E9/L (ref 4–11)

## 2018-02-16 PROCEDURE — 99213 OFFICE O/P EST LOW 20 MIN: CPT | Performed by: FAMILY MEDICINE

## 2018-02-16 PROCEDURE — 85379 FIBRIN DEGRADATION QUANT: CPT | Performed by: FAMILY MEDICINE

## 2018-02-16 PROCEDURE — 85652 RBC SED RATE AUTOMATED: CPT | Performed by: FAMILY MEDICINE

## 2018-02-16 PROCEDURE — 36415 COLL VENOUS BLD VENIPUNCTURE: CPT | Performed by: FAMILY MEDICINE

## 2018-02-16 PROCEDURE — 85025 COMPLETE CBC W/AUTO DIFF WBC: CPT | Performed by: FAMILY MEDICINE

## 2018-02-16 PROCEDURE — 86140 C-REACTIVE PROTEIN: CPT | Performed by: FAMILY MEDICINE

## 2018-02-16 NOTE — PROGRESS NOTES
"  SUBJECTIVE:   Seth Null is a 27 year old female who presents to clinic today for the following health issues:      SOB follow up    Still feeling like she needs to yawn or take a deep breath in order to get enough air in. Always present, believes same when she lays down, but not worse.     No lightheadedness or dizziness.     Tried pulmicort consistently over the past 2 weeks with no improvement in her symptoms.     Has had this once in the past following a respiratory illness, similar to this time around.     No cough or URI symptoms. No fevers.     Normal spirometry last visit.     No personal or FH of pectus excavatum.       Problem list and histories reviewed & adjusted, as indicated.  Additional history: none    There is no problem list on file for this patient.    Past Surgical History:   Procedure Laterality Date     MAMMOPLASTY REDUCTION         Social History   Substance Use Topics     Smoking status: Never Smoker     Smokeless tobacco: Never Used     Alcohol use Yes      Comment: some     Family History   Problem Relation Age of Onset     Hypertension Father      CANCER Maternal Grandfather      metastatic           Reviewed and updated as needed this visit by clinical staff       Reviewed and updated as needed this visit by Provider         ROS:  Constitutional, HEENT, cardiovascular, pulmonary, gi and gu systems are negative, except as otherwise noted.    OBJECTIVE:     /76 (BP Location: Right arm, Patient Position: Sitting, Cuff Size: Adult Regular)  Pulse 101  Temp 98.4  F (36.9  C) (Oral)  Ht 5' 5.25\" (1.657 m)  Wt 145 lb (65.8 kg)  SpO2 100%  BMI 23.94 kg/m2  Body mass index is 23.94 kg/(m^2).  GENERAL: healthy, alert and no distress  RESP: lungs clear to auscultation - no rales, rhonchi or wheezes  CV: regular rate and rhythm, normal S1 S2, no S3 or S4, no murmur, click or rub, no peripheral edema and peripheral pulses strong    Diagnostic Test Results:  Results for orders placed or " performed in visit on 02/16/18 (from the past 24 hour(s))   ESR: Erythrocyte sedimentation rate   Result Value Ref Range    Sed Rate 5 0 - 20 mm/h   CBC with platelets and differential   Result Value Ref Range    WBC 4.9 4.0 - 11.0 10e9/L    RBC Count 5.01 3.8 - 5.2 10e12/L    Hemoglobin 14.6 11.7 - 15.7 g/dL    Hematocrit 43.5 35.0 - 47.0 %    MCV 87 78 - 100 fl    MCH 29.1 26.5 - 33.0 pg    MCHC 33.6 31.5 - 36.5 g/dL    RDW 13.9 10.0 - 15.0 %    Platelet Count 297 150 - 450 10e9/L    Diff Method Automated Method     % Neutrophils 51.1 %    % Lymphocytes 39.5 %    % Monocytes 7.8 %    % Eosinophils 1.0 %    % Basophils 0.6 %    Absolute Neutrophil 2.5 1.6 - 8.3 10e9/L    Absolute Lymphocytes 1.9 0.8 - 5.3 10e9/L    Absolute Monocytes 0.4 0.0 - 1.3 10e9/L    Absolute Eosinophils 0.1 0.0 - 0.7 10e9/L    Absolute Basophils 0.0 0.0 - 0.2 10e9/L       ASSESSMENT/PLAN:     1. Dyspnea, unspecified type - discussed normal O2 levels and RR. Will consult with pulm to see if they have any alternative suggestions. Will also perform lab work today.   - PULMONARY MEDICINE REFERRAL  - CRP, inflammation  - ESR: Erythrocyte sedimentation rate  - CBC with platelets and differential  - D dimer, quantitative      Natasha Koch MD  [facility]

## 2018-02-16 NOTE — MR AVS SNAPSHOT
After Visit Summary   2/16/2018    Seth Null    MRN: [id number]           Patient Information     Date Of Birth          1990        Visit Information        Provider Department      2/16/2018 7:40 AM Natasha Koch MD [facility]        Today's Diagnoses     Dyspnea, unspecified type    -  1       Follow-ups after your visit        Additional Services     PULMONARY MEDICINE REFERRAL       Your provider has referred you to: [facility]: [place] [facility] [phone]   [email or web address]    Please be aware that coverage of these services is subject to the terms and limitations of your health insurance plan.  Call member services at your health plan with any benefit or coverage questions.      Please bring the following with you to your appointment:    (1) Any X-Rays, CTs or MRIs which have been performed.  Contact the facility where they were done to arrange for  prior to your scheduled appointment.    (2) List of current medications   (3) This referral request   (4) Any documents/labs given to you for this referral                  Who to contact     If you have questions or need follow up information about today's clinic visit or your schedule please contact [facility] directly at [phone].  Normal or non-critical lab and imaging results will be communicated to you by MyChart, letter or phone within 4 business days after the clinic has received the results. If you do not hear from us within 7 days, please contact the clinic through MyChart or phone. If you have a critical or abnormal lab result, we will notify you by phone as soon as possible.  Submit refill requests through [employer] or call your pharmacy and they will forward the refill request to us. Please allow 3 business days for your refill to be completed.          Additional Information About Your Visit        MyChart Information     [employer] gives you secure access to your  "electronic health record. If you see a primary care provider, you can also send messages to your care team and make appointments. If you have questions, please call your primary care clinic.  If you do not have a primary care provider, please call [phone] and they will assist you.        Care EveryWhere ID     This is your Care EveryWhere ID. This could be used by other organizations to access your [place] medical records  [id number]        Your Vitals Were     Pulse Temperature Height Pulse Oximetry BMI (Body Mass Index)       101 98.4  F (36.9  C) (Oral) 5' 5.25\" (1.657 m) 100% 23.94 kg/m2        Blood Pressure from Last 3 Encounters:   02/16/18 110/76   02/15/18 119/81   02/02/18 114/78    Weight from Last 3 Encounters:   02/16/18 145 lb (65.8 kg)   02/02/18 145 lb (65.8 kg)   12/21/17 142 lb (64.4 kg)              We Performed the Following     PULMONARY MEDICINE REFERRAL          Today's Medication Changes          These changes are accurate as of 2/16/18  8:17 AM.  If you have any questions, ask your nurse or doctor.               Stop taking these medicines if you haven't already. Please contact your care team if you have questions.     albuterol 108 (90 BASE) MCG/ACT Inhaler   Commonly known as:  PROAIR HFA/PROVENTIL HFA/VENTOLIN HFA   Stopped by:  Natasha Koch MD           budesonide 180 MCG/ACT inhaler   Commonly known as:  PULMICORT FLEXHALER   Stopped by:  Natasha Koch MD                    Primary Care Provider Office Phone # Fax #    Natasha Koch [phone] [address] [facility] [address]        Equal Access to Services     LILIAM GARIBAY : Sridevi Arias, waalbertoda shanika, qaybta kaalmada lisa, jordyn fox. So [facility] [place] [phone].    ATENCIÓN: Si habla español, tiene a sotelo disposición servicios gratuitos de asistencia lingüística. Geetha al [phone].    We comply with applicable federal civil " rights laws and [place] laws. We do not discriminate on the basis of race, color, national origin, age, disability, sex, sexual orientation, or gender identity.            Thank you!     Thank you for choosing [facility]  for your care. Our goal is always to provide you with excellent care. Hearing back from our patients is one way we can continue to improve our services. Please take a few minutes to complete the written survey that you may receive in the mail after your visit with us. Thank you!             Your Updated Medication List - Protect others around you: Learn how to safely use, store and throw away your medicines at www.disposemymeds.org.          This list is accurate as of 2/16/18  8:17 AM.  Always use your most recent med list.                   Brand Name Dispense Instructions for use Diagnosis    norgestimate-ethinyl estradiol 0.25-35 MG-MCG per tablet    ORTHO-CYCLEN, SPRINTEC    84 tablet    Take 1 tablet by mouth daily    Encounter for surveillance of contraceptive pills

## 2018-02-16 NOTE — NURSING NOTE
"Chief Complaint   Patient presents with     RECHECK     SOB concerns       Initial /76 (BP Location: Right arm, Patient Position: Sitting, Cuff Size: Adult Regular)  Pulse 101  Temp 98.4  F (36.9  C) (Oral)  Ht 5' 5.25\" (1.657 m)  Wt 145 lb (65.8 kg)  SpO2 100%  BMI 23.94 kg/m2 Estimated body mass index is 23.94 kg/(m^2) as calculated from the following:    Height as of this encounter: 5' 5.25\" (1.657 m).    Weight as of this encounter: 145 lb (65.8 kg).  Medication Reconciliation: complete     Tom Rg CMA          "

## 2018-02-19 ENCOUNTER — TELEPHONE (OUTPATIENT)
Dept: DERMATOLOGY | Facility: CLINIC | Age: 28
End: 2018-02-19

## 2018-02-19 ENCOUNTER — MYC MEDICAL ADVICE (OUTPATIENT)
Dept: FAMILY MEDICINE | Facility: CLINIC | Age: 28
End: 2018-02-19

## 2018-02-19 LAB — COPATH REPORT: NORMAL

## 2018-02-19 NOTE — TELEPHONE ENCOUNTER
Notes Recorded by Clifford Bowles MD on 2/19/2018 at 2:30 PM  Skin of left lateral thigh, lesion, excision-   - Inflamed nevus lipomatosis .

## 2018-02-19 NOTE — TELEPHONE ENCOUNTER
Left message on voicemail with test results.       JOSÉ MIGUEL Rosado-BSN  [facility]  [phone]

## 2018-02-20 ENCOUNTER — OFFICE VISIT (OUTPATIENT)
Dept: URGENT CARE | Facility: URGENT CARE | Age: 28
End: 2018-02-20
Payer: COMMERCIAL

## 2018-02-20 ENCOUNTER — RADIANT APPOINTMENT (OUTPATIENT)
Dept: GENERAL RADIOLOGY | Facility: CLINIC | Age: 28
End: 2018-02-20
Attending: PHYSICIAN ASSISTANT
Payer: COMMERCIAL

## 2018-02-20 VITALS
SYSTOLIC BLOOD PRESSURE: 124 MMHG | TEMPERATURE: 97.1 F | DIASTOLIC BLOOD PRESSURE: 78 MMHG | OXYGEN SATURATION: 99 % | HEART RATE: 84 BPM | BODY MASS INDEX: 23.82 KG/M2 | WEIGHT: 143 LBS | HEIGHT: 65 IN

## 2018-02-20 DIAGNOSIS — R07.9 ACUTE CHEST PAIN: Primary | ICD-10-CM

## 2018-02-20 DIAGNOSIS — R07.9 ACUTE CHEST PAIN: ICD-10-CM

## 2018-02-20 PROCEDURE — 71046 X-RAY EXAM CHEST 2 VIEWS: CPT

## 2018-02-20 PROCEDURE — 99213 OFFICE O/P EST LOW 20 MIN: CPT | Performed by: PHYSICIAN ASSISTANT

## 2018-02-20 NOTE — PROGRESS NOTES
"    SUBJECTIVE:     Chief Complaint   Patient presents with     Chest Pain     shortness of breath 1 month, chest tightness x 2-3 days, pt is going to the lung center in [facility],  has been seen for this before     Seth Null is a 27 year old female presenting with a chief complaint of new onset chest tightness within context of continued shortness of breath. Discomfort is minimal, approximately 2/10.  Worsens up to 3/10 with attention and focus on area, especially at times of rest.     Review of Systems   Constitutional: Negative for chills, diaphoresis, fever and malaise/fatigue.   HENT: Negative.    Eyes: Negative.    Respiratory: Positive for shortness of breath. Negative for cough, hemoptysis, sputum production and wheezing.    Cardiovascular: Positive for chest pain. Negative for palpitations, orthopnea, claudication, leg swelling and PND.   Gastrointestinal: Negative.    Genitourinary: Negative.    Musculoskeletal: Negative.    Skin: Negative.    Neurological: Negative for weakness.         Past Medical History:   Diagnosis Date     NO ACTIVE PROBLEMS (aka NONE)      Current Outpatient Prescriptions   Medication Sig Dispense Refill     norgestimate-ethinyl estradiol (ORTHO-CYCLEN, SPRINTEC) 0.25-35 MG-MCG per tablet Take 1 tablet by mouth daily 84 tablet 3     Social History   Substance Use Topics     Smoking status: Never Smoker     Smokeless tobacco: Never Used     Alcohol use Yes      Comment: some       OBJECTIVE  /78 (BP Location: Right arm, Patient Position: Chair, Cuff Size: Adult Regular)  Pulse 84  Temp 97.1  F (36.2  C) (Oral)  Ht 5' 5.25\" (1.657 m)  Wt 143 lb (64.9 kg)  LMP 02/14/2018 (Exact Date)  SpO2 99%  Breastfeeding? No  BMI 23.61 kg/m2    Physical Exam   Constitutional: She is well-developed, well-nourished, and in no distress.   HENT:   Head: Normocephalic.   Right Ear: External ear normal.   Left Ear: External ear normal.   Mouth/Throat: Oropharynx is clear and moist. No " oropharyngeal exudate.   Eyes: Conjunctivae and EOM are normal. Pupils are equal, round, and reactive to light.   Neck: Normal range of motion. Neck supple.   Cardiovascular: Normal rate, regular rhythm, normal heart sounds and intact distal pulses.  Exam reveals no gallop and no friction rub.    No murmur heard.  Pulmonary/Chest: Effort normal and breath sounds normal. No respiratory distress. She has no wheezes. She has no rales. She exhibits no tenderness.   Musculoskeletal:   No pain with cross arm pull       CXR: WNL    ASSESSMENT:      ICD-10-CM    1. Acute chest pain R07.9 XR Chest 2 Views     Does not appear to be cardiac, or PE.  Possibly pleurisy following infection.    Red flags and emergent follow up discussed, and understood by patient  Follow up with PCP if symptoms worsen or fail to improve    Otherwise follow up with Pulmonology on Friday as scheduled      Patient Instructions     Ibuprofen 800mg three times a day for 3-5 days  Follow up with primary this week  ED/911 with red flag symptoms    Pleurisy    If you have pleurisy, the lining around your lungs is inflamed. This is most often due to a viral infection or pneumonia. It usually lasts for 10 to 14 days. It may cause sharp pain with breathing, coughing, sneezing, and movement. Antibiotics are usually not prescribed for this condition unless pneumonia is also present.  The following tips will help you care for your condition at home:    If symptoms are severe, rest at home for the first 2 to 3 days. When you resume activity, don't let yourself get too tired.    Do not smoke. Also avoid being exposed to secondhand smoke.    You may use over-the-counter medicines to control pain, unless another pain medicine was prescribed. (Note: If you have chronic liver or kidney disease or have ever had a stomach ulcer or gastrointestinal bleeding, talk with your healthcare provider before using these medicines. Also talk to your provider if you are taking  medicine to prevent blood clots.) Aspirin should never be given to anyone younger than 18 years of age who is ill with a viral infection or fever. It may cause severe liver or brain damage.  Follow-up care  Follow up with your healthcare provider, or as advised.  When to seek medical advice  Call your healthcare provider right away if any of these occur:    Fever over 100.4 F (38 C)    Coughing up lots of colored sputum (mucus)    Redness, pain, or swelling of the leg  Call 911, or get immediate medical care  Contact emergency services right away if any of these occur:    Increasing shortness of breath    Increasing chest pain, or pain that spreads to the neck, arm, or back    Coughing up blood  Date Last Reviewed: 9/13/2015 2000-2017 The "[employer]. [address] [place] [address], [place] [address]. All rights reserved. This information is not intended as a substitute for professional medical care. Always follow your healthcare professional's instructions.

## 2018-02-20 NOTE — NURSING NOTE
"Chief Complaint   Patient presents with     Chest Pain     shortness of breath 1 month, chest tightness x 2-3 days, pt is going to the lung center in [facility],  has been seen for this before       Initial /78 (BP Location: Right arm, Patient Position: Chair, Cuff Size: Adult Regular)  Pulse 84  Temp 97.1  F (36.2  C) (Oral)  Ht 5' 5.25\" (1.657 m)  Wt 143 lb (64.9 kg)  LMP 02/14/2018 (Exact Date)  SpO2 99%  Breastfeeding? No  BMI 23.61 kg/m2 Estimated body mass index is 23.61 kg/(m^2) as calculated from the following:    Height as of this encounter: 5' 5.25\" (1.657 m).    Weight as of this encounter: 143 lb (64.9 kg).  Medication Reconciliation: jami Ellis CMA      "

## 2018-02-20 NOTE — PATIENT INSTRUCTIONS
Ibuprofen 800mg three times a day for 3-5 days  Follow up with primary this week  ED/911 with red flag symptoms    Pleurisy    If you have pleurisy, the lining around your lungs is inflamed. This is most often due to a viral infection or pneumonia. It usually lasts for 10 to 14 days. It may cause sharp pain with breathing, coughing, sneezing, and movement. Antibiotics are usually not prescribed for this condition unless pneumonia is also present.  The following tips will help you care for your condition at home:    If symptoms are severe, rest at home for the first 2 to 3 days. When you resume activity, don't let yourself get too tired.    Do not smoke. Also avoid being exposed to secondhand smoke.    You may use over-the-counter medicines to control pain, unless another pain medicine was prescribed. (Note: If you have chronic liver or kidney disease or have ever had a stomach ulcer or gastrointestinal bleeding, talk with your healthcare provider before using these medicines. Also talk to your provider if you are taking medicine to prevent blood clots.) Aspirin should never be given to anyone younger than 18 years of age who is ill with a viral infection or fever. It may cause severe liver or brain damage.  Follow-up care  Follow up with your healthcare provider, or as advised.  When to seek medical advice  Call your healthcare provider right away if any of these occur:    Fever over 100.4 F (38 C)    Coughing up lots of colored sputum (mucus)    Redness, pain, or swelling of the leg  Call 911, or get immediate medical care  Contact emergency services right away if any of these occur:    Increasing shortness of breath    Increasing chest pain, or pain that spreads to the neck, arm, or back    Coughing up blood  Date Last Reviewed: 9/13/2015 2000-2017 [employer]. [address] [place] [address], [place] [address]. All rights reserved. This information is not intended as a substitute for professional  medical care. Always follow your healthcare professional's instructions.

## 2018-02-21 ASSESSMENT — ENCOUNTER SYMPTOMS
WHEEZING: 0
PALPITATIONS: 0
DIAPHORESIS: 0
COUGH: 0
WEAKNESS: 0
PND: 0
SPUTUM PRODUCTION: 0
SHORTNESS OF BREATH: 1
ORTHOPNEA: 0
CHILLS: 0
GASTROINTESTINAL NEGATIVE: 1
EYES NEGATIVE: 1
FEVER: 0
HEMOPTYSIS: 0
MUSCULOSKELETAL NEGATIVE: 1
CLAUDICATION: 0

## 2018-02-23 ENCOUNTER — MYC MEDICAL ADVICE (OUTPATIENT)
Dept: FAMILY MEDICINE | Facility: CLINIC | Age: 28
End: 2018-02-23

## 2018-02-24 NOTE — TELEPHONE ENCOUNTER
Confirming mychart message, ok for Monday, routed to [facility]  Akua Haynes RN, BSN  Message handled by Nurse Triage.

## 2018-02-24 NOTE — TELEPHONE ENCOUNTER
JH, see FYI pt response, no action needed  Akua Haynes RN, BSN  Message handled by Nurse Triage.

## 2018-03-02 ENCOUNTER — TRANSFERRED RECORDS (OUTPATIENT)
Dept: HEALTH INFORMATION MANAGEMENT | Facility: CLINIC | Age: 28
End: 2018-03-02

## 2018-06-22 ENCOUNTER — OFFICE VISIT (OUTPATIENT)
Dept: FAMILY MEDICINE | Facility: CLINIC | Age: 28
End: 2018-06-22
Payer: COMMERCIAL

## 2018-06-22 VITALS
WEIGHT: 140 LBS | DIASTOLIC BLOOD PRESSURE: 68 MMHG | BODY MASS INDEX: 23.32 KG/M2 | HEART RATE: 85 BPM | SYSTOLIC BLOOD PRESSURE: 99 MMHG | TEMPERATURE: 99.6 F | HEIGHT: 65 IN | OXYGEN SATURATION: 99 %

## 2018-06-22 DIAGNOSIS — R10.11 RUQ ABDOMINAL PAIN: Primary | ICD-10-CM

## 2018-06-22 LAB
BASOPHILS # BLD AUTO: 0 10E9/L (ref 0–0.2)
BASOPHILS NFR BLD AUTO: 0.2 %
DIFFERENTIAL METHOD BLD: NORMAL
EOSINOPHIL # BLD AUTO: 0 10E9/L (ref 0–0.7)
EOSINOPHIL NFR BLD AUTO: 0.5 %
ERYTHROCYTE [DISTWIDTH] IN BLOOD BY AUTOMATED COUNT: 13.3 % (ref 10–15)
HCT VFR BLD AUTO: 41.9 % (ref 35–47)
HGB BLD-MCNC: 14.4 G/DL (ref 11.7–15.7)
LYMPHOCYTES # BLD AUTO: 2.6 10E9/L (ref 0.8–5.3)
LYMPHOCYTES NFR BLD AUTO: 30.6 %
MCH RBC QN AUTO: 29.8 PG (ref 26.5–33)
MCHC RBC AUTO-ENTMCNC: 34.4 G/DL (ref 31.5–36.5)
MCV RBC AUTO: 87 FL (ref 78–100)
MONOCYTES # BLD AUTO: 0.7 10E9/L (ref 0–1.3)
MONOCYTES NFR BLD AUTO: 7.8 %
NEUTROPHILS # BLD AUTO: 5.1 10E9/L (ref 1.6–8.3)
NEUTROPHILS NFR BLD AUTO: 60.9 %
PLATELET # BLD AUTO: 288 10E9/L (ref 150–450)
RBC # BLD AUTO: 4.83 10E12/L (ref 3.8–5.2)
WBC # BLD AUTO: 8.4 10E9/L (ref 4–11)

## 2018-06-22 PROCEDURE — 85025 COMPLETE CBC W/AUTO DIFF WBC: CPT | Performed by: FAMILY MEDICINE

## 2018-06-22 PROCEDURE — 80053 COMPREHEN METABOLIC PANEL: CPT | Performed by: FAMILY MEDICINE

## 2018-06-22 PROCEDURE — 36415 COLL VENOUS BLD VENIPUNCTURE: CPT | Performed by: FAMILY MEDICINE

## 2018-06-22 PROCEDURE — 99213 OFFICE O/P EST LOW 20 MIN: CPT | Performed by: FAMILY MEDICINE

## 2018-06-22 NOTE — PROGRESS NOTES
"  SUBJECTIVE:   Seth Null is a 27 year old female who presents to clinic today for the following health issues:      Abdominal Pain      Duration: 2 weeks     Description pressure in right abdomen        Associated flank pain: None    Intensity:  mild    Accompanying signs and symptoms:        Fever/Chills: no        Gas/Bloating: YES       Nausea/vomitting: no        Diarrhea: no        Dysuria or Hematuria: no     History (previous similar pain/trauma/previous testing): no    Precipitating or alleviating factors:       Pain worse with eating/BM/urination: none       Pain relieved by BM: YES- littlebit    Therapies tried and outcome: None    LMP:  06/02/18      Was worse 2 days ago, now symptoms have mostly subsided. Seems to wax and wane in course.     No change in BM or dysuria.   No fevers.     Taking PNV for the past 2 months. Trying to conceive.       Problem list and histories reviewed & adjusted, as indicated.  Additional history: none    There is no problem list on file for this patient.    Past Surgical History:   Procedure Laterality Date     MAMMOPLASTY REDUCTION         Social History   Substance Use Topics     Smoking status: Never Smoker     Smokeless tobacco: Never Used     Alcohol use Yes      Comment: some     Family History   Problem Relation Age of Onset     Hypertension Father      Cancer Maternal Grandfather      metastatic           Reviewed and updated as needed this visit by clinical staff       Reviewed and updated as needed this visit by Provider         ROS:  Constitutional, HEENT, cardiovascular, pulmonary, gi and gu systems are negative, except as otherwise noted.    OBJECTIVE:     BP 99/68 (BP Location: Right arm, Patient Position: Sitting, Cuff Size: Adult Regular)  Pulse 85  Temp 99.6  F (37.6  C) (Oral)  Ht 5' 5\" (1.651 m)  Wt 140 lb (63.5 kg)  LMP 06/02/2018 (Exact Date)  SpO2 99%  Breastfeeding? No  BMI 23.3 kg/m2  Body mass index is 23.3 kg/(m^2).  GENERAL: healthy, " alert and no distress  RESP: lungs clear to auscultation - no rales, rhonchi or wheezes  CV: regular rate and rhythm, normal S1 S2, no S3 or S4, no murmur  ABDOMEN: soft, nontender, no hepatosplenomegaly, no masses and bowel sounds normal, negative Trevizo's  MS: no ttp over the lateral right ribs    Diagnostic Test Results:      ASSESSMENT/PLAN:     1. RUQ abdominal pain - discussed muscular pain versus constipation/increased bowel gas. Will get lab work to evaluate for hepatobiliary causes and r/o infection.   - CBC with platelets and differential  - Comprehensive metabolic panel (BMP + Alb, Alk Phos, ALT, AST, Total. Bili, TP)      Natasha Koch MD  [facility]

## 2018-06-22 NOTE — MR AVS SNAPSHOT
"              After Visit Summary   6/22/2018    Seth Null    MRN: [id number]           Patient Information     Date Of Birth          1990        Visit Information        Provider Department      6/22/2018 1:40 PM Natasha Kcoh MD [facility]        Today's Diagnoses     RUQ abdominal pain    -  1       Follow-ups after your visit        Follow-up notes from your care team     Return in about 1 year (around 6/22/2019) for Yearly Physical Exam.      Who to contact     If you have questions or need follow up information about today's clinic visit or your schedule please contact [facility] directly at [phone].  Normal or non-critical lab and imaging results will be communicated to you by MyChart, letter or phone within 4 business days after the clinic has received the results. If you do not hear from us within 7 days, please contact the clinic through [employer] or phone. If you have a critical or abnormal lab result, we will notify you by phone as soon as possible.  Submit refill requests through [employer] or call your pharmacy and they will forward the refill request to us. Please allow 3 business days for your refill to be completed.          Additional Information About Your Visit        MyChart Information     [employer] gives you secure access to your electronic health record. If you see a primary care provider, you can also send messages to your care team and make appointments. If you have questions, please call your primary care clinic.  If you do not have a primary care provider, please call [phone] and they will assist you.        Care EveryWhere ID     This is your Care EveryWhere ID. This could be used by other organizations to access your [place] medical records  [id number]        Your Vitals Were     Pulse Temperature Height Last Period Pulse Oximetry Breastfeeding?    85 99.6  F (37.6  C) (Oral) 5' 5\" (1.651 m) 06/02/2018 (Exact Date) 99% No    BMI " (Body Mass Index)                   23.3 kg/m2            Blood Pressure from Last 3 Encounters:   06/22/18 99/68   02/20/18 124/78   02/16/18 110/76    Weight from Last 3 Encounters:   06/22/18 140 lb (63.5 kg)   02/20/18 143 lb (64.9 kg)   02/16/18 145 lb (65.8 kg)              We Performed the Following     CBC with platelets and differential     Comprehensive metabolic panel (BMP + Alb, Alk Phos, ALT, AST, Total. Bili, TP)          Today's Medication Changes          These changes are accurate as of 6/22/18  2:35 PM.  If you have any questions, ask your nurse or doctor.               Stop taking these medicines if you haven't already. Please contact your care team if you have questions.     norgestimate-ethinyl estradiol 0.25-35 MG-MCG per tablet   Commonly known as:  ORTHO-CYCLEN, SPRINTEC   Stopped by:  Natasha Koch MD                    Primary Care Provider Office Phone # Fax #    Natasha Koch [phone] [address] [facility] [address]        Equal Access to Services     [facility]: Hadii aad ku hadasho Soomaali, waaxda luqadaha, qaybta kaalmada adeegyada, jordyn torre . So [facility] [phone].    ATENCIÓN: Si habla español, tiene a sotelo disposición servicios gratuitos de asistencia lingüística. [facility] [phone].    We comply with applicable federal civil rights laws and [place] laws. We do not discriminate on the basis of race, color, national origin, age, disability, sex, sexual orientation, or gender identity.            Thank you!     Thank you for choosing [facility]  for your care. Our goal is always to provide you with excellent care. Hearing back from our patients is one way we can continue to improve our services. Please take a few minutes to complete the written survey that you may receive in the mail after your visit with us. Thank you!             Your Updated Medication List - Protect others around  you: Learn how to safely use, store and throw away your medicines at www.disposemymeds.org.      Notice  As of 6/22/2018  2:35 PM    You have not been prescribed any medications.

## 2018-06-23 LAB
ALBUMIN SERPL-MCNC: 4.4 G/DL (ref 3.4–5)
ALP SERPL-CCNC: 44 U/L (ref 40–150)
ALT SERPL W P-5'-P-CCNC: 20 U/L (ref 0–50)
ANION GAP SERPL CALCULATED.3IONS-SCNC: 8 MMOL/L (ref 3–14)
AST SERPL W P-5'-P-CCNC: 19 U/L (ref 0–45)
BILIRUB SERPL-MCNC: 0.5 MG/DL (ref 0.2–1.3)
BUN SERPL-MCNC: 13 MG/DL (ref 7–30)
CALCIUM SERPL-MCNC: 9.1 MG/DL (ref 8.5–10.1)
CHLORIDE SERPL-SCNC: 106 MMOL/L (ref 94–109)
CO2 SERPL-SCNC: 25 MMOL/L (ref 20–32)
CREAT SERPL-MCNC: 0.62 MG/DL (ref 0.52–1.04)
GFR SERPL CREATININE-BSD FRML MDRD: >90 ML/MIN/1.7M2
GLUCOSE SERPL-MCNC: 89 MG/DL (ref 70–99)
POTASSIUM SERPL-SCNC: 4.6 MMOL/L (ref 3.4–5.3)
PROT SERPL-MCNC: 7.6 G/DL (ref 6.8–8.8)
SODIUM SERPL-SCNC: 139 MMOL/L (ref 133–144)

## 2018-11-01 ENCOUNTER — PRENATAL OFFICE VISIT (OUTPATIENT)
Dept: OBGYN | Facility: CLINIC | Age: 28
End: 2018-11-01
Payer: COMMERCIAL

## 2018-11-01 VITALS — WEIGHT: 135 LBS | BODY MASS INDEX: 22.47 KG/M2 | DIASTOLIC BLOOD PRESSURE: 72 MMHG | SYSTOLIC BLOOD PRESSURE: 110 MMHG

## 2018-11-01 DIAGNOSIS — Z23 NEED FOR PROPHYLACTIC VACCINATION AND INOCULATION AGAINST INFLUENZA: Primary | ICD-10-CM

## 2018-11-01 DIAGNOSIS — N97.9 FEMALE INFERTILITY: ICD-10-CM

## 2018-11-01 PROCEDURE — 99214 OFFICE O/P EST MOD 30 MIN: CPT | Mod: 25 | Performed by: OBSTETRICS & GYNECOLOGY

## 2018-11-01 PROCEDURE — 90471 IMMUNIZATION ADMIN: CPT | Performed by: OBSTETRICS & GYNECOLOGY

## 2018-11-01 PROCEDURE — 90686 IIV4 VACC NO PRSV 0.5 ML IM: CPT | Performed by: OBSTETRICS & GYNECOLOGY

## 2018-11-01 NOTE — PROGRESS NOTES

## 2018-11-01 NOTE — NURSING NOTE
"Chief Complaint   Patient presents with     Fertility       Initial /72  Wt 135 lb (61.2 kg)  LMP 10/26/2018 (Exact Date)  BMI 22.47 kg/m2 Estimated body mass index is 22.47 kg/(m^2) as calculated from the following:    Height as of 18: 5' 5\" (1.651 m).    Weight as of this encounter: 135 lb (61.2 kg).  BP completed using cuff size: regular    Questioned patient about current smoking habits.  Pt. has never smoked.          The following HM Due: NONE      The following patient reported/Care Every where data was sent to:  P ABSTRACT QUALITY INITIATIVES [[id number]]        Susana Rod [facility]                 "

## 2018-11-01 NOTE — MR AVS SNAPSHOT
After Visit Summary   11/1/2018    Seth Null    MRN: [id number]           Patient Information     Date Of Birth          1990        Visit Information        Provider Department      11/1/2018 3:30 PM Chito Ahmadi MD [facility]        Today's Diagnoses     Need for prophylactic vaccination and inoculation against influenza    -  1    Female infertility          Care Instructions    You can reach your [place] Care Team any time of the day by calling [phone]. This number will put you in touch with the 24 hour nurse line if the clinic is closed.    To contact your OB/GYN Station Coordinator/Surgery Scheduler please call [phone]. This is a direct number for your care team between 8 a.m. and 4 p.m. Monday through Friday.    [facility] is open for your convenience:  Monday through Friday 8 a.m. to 6 p.m.  Closed weekends and all major holidays.            Follow-ups after your visit        Follow-up notes from your care team     Return in about 1 month (around 12/1/2018).      Who to contact     If you have questions or need follow up information about today's clinic visit or your schedule please contact [facility] directly at [phone].  Normal or non-critical lab and imaging results will be communicated to you by MyChart, letter or phone within 4 business days after the clinic has received the results. If you do not hear from us within 7 days, please contact the clinic through Towandas bookhart or phone. If you have a critical or abnormal lab result, we will notify you by phone as soon as possible.  Submit refill requests through 99[employer] Custom or call your pharmacy and they will forward the refill request to us. Please allow 3 business days for your refill to be completed.          Additional Information About Your Visit        MyChart Information     [employer] gives you secure access to your electronic health record. If you see a primary  care provider, you can also send messages to your care team and make appointments. If you have questions, please call your primary care clinic.  If you do not have a primary care provider, please call [phone] and they will assist you.        Care EveryWhere ID     This is your Care EveryWhere ID. This could be used by other organizations to access your [place] medical records  [id number]        Your Vitals Were     Last Period BMI (Body Mass Index)                10/26/2018 (Exact Date) 22.47 kg/m2           Blood Pressure from Last 3 Encounters:   11/01/18 110/72   06/22/18 99/68   02/20/18 124/78    Weight from Last 3 Encounters:   11/01/18 135 lb (61.2 kg)   06/22/18 140 lb (63.5 kg)   02/20/18 143 lb (64.9 kg)              We Performed the Following     FLU VACCINE, SPLIT VIRUS, IM (QUADRIVALENT) [96903]- >3 YRS        Primary Care Provider Office Phone # Fax #    Natasha Lamonte Koch [phone] [address] [facility] [address]        Equal Access to Services     [facility] HospitalJUN[facility] : Hadii aad ku hadasho Soomaali, waaxda luqadaha, qaybta kaalmada adeegyada, jordyn torre . So [facility] [phone].    ATENCIÓN: Si habla español, tiene a sotelo disposición servicios gratuitos de asistencia lingüística. Llame al [phone].    We comply with applicable federal civil rights laws and [place] laws. We do not discriminate on the basis of race, color, national origin, age, disability, sex, sexual orientation, or gender identity.            Thank you!     Thank you for choosing [facility]  for your care. Our goal is always to provide you with excellent care. Hearing back from our patients is one way we can continue to improve our services. Please take a few minutes to complete the written survey that you may receive in the mail after your visit with us. Thank you!             Your Updated Medication List - Protect others around you: Learn how  to safely use, store and throw away your medicines at www.disposemymeds.org.      Notice  As of 11/1/2018 11:59 PM    You have not been prescribed any medications.

## 2018-11-05 PROBLEM — N97.9 FEMALE INFERTILITY: Status: ACTIVE | Noted: 2018-11-05

## 2018-11-05 NOTE — PROGRESS NOTES
HPI:  Seth Null is a 28 year old female  Patient's last menstrual period was 10/26/2018 (exact date).  Trying for the last year without success to conceive   who presents for evaluation of primary infertility  1.  Male factors    her  is a 29-year-old white male never fathered any previous children without any ongoing health concerns other than he is on antihypertensive medication, does not smoke social alcohol use, no drug use surgery no injury to his male reproductive organs no hernia repairs no sexual dysfunction and has not had a semen analysis.  He has a normal libido and no ejaculatory dysfunction    2.   Female factors        A).  Ovulation menarche at age 13 regular monthly 28-38-day menses with 4 days of flow mild cramping relieved with ibuprofen.  No abnormal bleeding postcoital bleeding abnormal Pap smears surgical surgery or other abdominal procedures.  She does not ovulation kit and gets a positive response on day 12-14.  In the past she is intermittently done basal body temperatures and recalls them being biphasic            B) tubal factors no history of any sexually transmitted diseases IUD use PID or previous abdominal pelvic surgical procedures or infectious procedures.  No hysterosalpingogram to date                  C).  Luteal factors   BMI 22.47, no family history of endometriosis.  No excessive dysmenorrhea or pelvic pain    3.   Couple factors   no signs of hyperandrogenism excess sebum production or hirsutism galactorrhea or thyroid dysfunction.  Patient denies pain with intercourse.  States that she is orgastric.  They have adequate recumbency do not use lubricants and have intercourse during the fertile time of the cycle.    Past Medical History:   Diagnosis Date     NO ACTIVE PROBLEMS (aka NONE)       No current outpatient prescriptions on file.     No current facility-administered medications for this visit.        Past Surgical History:   Procedure Laterality Date      MAMMOPLASTY REDUCTION       Family History   Problem Relation Age of Onset     Hypertension Father      Cancer Maternal Grandfather      metastatic     Social History     Social History     Marital status:      Spouse name: N/A     Number of children: N/A     Years of education: N/A     Occupational History     special  w  [employer]     Social History Main Topics     Smoking status: Never Smoker     Smokeless tobacco: Never Used     Alcohol use Yes      Comment: some     Drug use: No     Sexual activity: Yes     Partners: Male     Other Topics Concern     Parent/Sibling W/ Cabg, Mi Or Angioplasty Before 65f 55m? No     Social History Narrative       Allergies:  Review of patient's allergies indicates no known allergies.    No current outpatient prescriptions on file.       Review Of Systems   ROS: 10 point ROS neg other than the symptoms noted above in the HPI.    Exam:  /72  Wt 135 lb (61.2 kg)  LMP 10/26/2018 (Exact Date)  BMI 22.47 kg/m2  {Constitutional: healthy, alert and no distress  Head: Normocephalic. No masses, lesions, tenderness or abnormalities  Neck: Neck supple. No adenopathy. Thyroid symmetric, normal size,, Carotids without bruits.  ENT: NEGATIVE for ear, mouth and throat problems  Breast:  breasts symmetric, no dominant or suspicious mass, no skin or nipple changes, no axillary adenopathy, unchanged from previous exam or self exam in taught and encouraged no galactorrhea   Cardiovascular: negative, PMI normal. No lifts, heaves, or thrills. RRR. No murmurs, clicks gallops or rub  Respiratory: negative, Percussion normal. Good diaphragmatic excursion. Lungs clear  Gastrointestinal: Abdomen soft, non-tender. BS normal. No masses, organomegaly  Genitourinary: Normal external genitalia without lesions and normal female escutcheon no clitorimegaly speculum normal-appearing vaginal epithelium nulliparous cervix no lesions seen, bimanual exam the uterus is  nulliparous mobile smooth firm adnexa without masses enlargement or tenderness, rectovaginal exam normal sphincter tone minimal hemorrhoidal tissue no uterosacral ligament nodularity  Musculoskeletalextremities normal- no gross deformities noted, gait normal and normal muscle tone  Integument: no suspicious lesions or rashes  Neurologic: Gait normal. Reflexes normal and symmetric. Sensation grossly WNL.  Psychiatric: mentation appears normal and affect normal/bright  Hematologic/Lymphatic/Immunologic: Normal cervical lymph nodes    Assessment/Plan:  (Z23) Need for prophylactic vaccination and inoculation against influenza  (primary encounter diagnosis)  Comment: Patient requests flu shot  Plan: FLU VACCINE, SPLIT VIRUS, IM (QUADRIVALENT)         [66092]- >3 YRS        Done    (N97.9) Female infertility  Comment: Risks, benefits, and alternative modes of therapy discussed at length. Pathophysiology of the disease process reviewed, all of the patients questions answered and informed consent obtained.  I gave her a detailed written outline of our discussion today and the algorithm for infertility.  We will proceed with the following    Her  will get a semen analysis, she will do a month of basal body temperature charting ovulation kit usage.  7 days post ovulation for 11 days post LH surge she will come in for a serum progesterone.  We will do a huhners postcoital test.  Cycle day 1 2 or 3 she will come in for a TSH prolactin FSH and estradiol level.  A hysterosalpingogram will be scheduled during the follicular phase of her next cycle.  Prepregnancy planning including folic acid issues discussed at length.  She will begin prenatal vitamins  Plan: detailed written plan and outline given      Chito Ahmadi M.D.

## 2018-11-05 NOTE — PATIENT INSTRUCTIONS
You can reach your [place] Care Team any time of the day by calling [phone]. This number will put you in touch with the 24 hour nurse line if the clinic is closed.    To contact your OB/GYN Station Coordinator/Surgery Scheduler please call [phone]. This is a direct number for your care team between 8 a.m. and 4 p.m. Monday through Friday.    [facility] is open for your convenience:  Monday through Friday 8 a.m. to 6 p.m.  Closed weekends and all major holidays.

## 2018-12-01 DIAGNOSIS — Z34.90 SUPERVISION OF NORMAL PREGNANCY: ICD-10-CM

## 2018-12-01 LAB — B-HCG SERPL-ACNC: 6044 IU/L (ref 0–5)

## 2018-12-01 PROCEDURE — 36415 COLL VENOUS BLD VENIPUNCTURE: CPT | Performed by: ADVANCED PRACTICE MIDWIFE

## 2018-12-01 PROCEDURE — 84702 CHORIONIC GONADOTROPIN TEST: CPT | Performed by: ADVANCED PRACTICE MIDWIFE

## 2018-12-03 ENCOUNTER — PRENATAL OFFICE VISIT (OUTPATIENT)
Dept: NURSING | Facility: CLINIC | Age: 28
End: 2018-12-03
Payer: COMMERCIAL

## 2018-12-03 DIAGNOSIS — Z34.01 ENCOUNTER FOR SUPERVISION OF NORMAL FIRST PREGNANCY IN FIRST TRIMESTER: Primary | ICD-10-CM

## 2018-12-03 DIAGNOSIS — Z34.90 SUPERVISION OF NORMAL PREGNANCY: ICD-10-CM

## 2018-12-03 LAB
ABO + RH BLD: NORMAL
ABO + RH BLD: NORMAL
BLD GP AB SCN SERPL QL: NORMAL
BLOOD BANK CMNT PATIENT-IMP: NORMAL
ERYTHROCYTE [DISTWIDTH] IN BLOOD BY AUTOMATED COUNT: 13.4 % (ref 10–15)
HCT VFR BLD AUTO: 40.4 % (ref 35–47)
HGB BLD-MCNC: 13.7 G/DL (ref 11.7–15.7)
MCH RBC QN AUTO: 29.7 PG (ref 26.5–33)
MCHC RBC AUTO-ENTMCNC: 33.9 G/DL (ref 31.5–36.5)
MCV RBC AUTO: 88 FL (ref 78–100)
PLATELET # BLD AUTO: 287 10E9/L (ref 150–450)
RBC # BLD AUTO: 4.61 10E12/L (ref 3.8–5.2)
SPECIMEN EXP DATE BLD: NORMAL
WBC # BLD AUTO: 7.7 10E9/L (ref 4–11)

## 2018-12-03 PROCEDURE — 87340 HEPATITIS B SURFACE AG IA: CPT | Performed by: ADVANCED PRACTICE MIDWIFE

## 2018-12-03 PROCEDURE — 87389 HIV-1 AG W/HIV-1&-2 AB AG IA: CPT | Performed by: ADVANCED PRACTICE MIDWIFE

## 2018-12-03 PROCEDURE — 99207 ZZC NO CHARGE NURSE ONLY: CPT

## 2018-12-03 PROCEDURE — 86850 RBC ANTIBODY SCREEN: CPT | Performed by: ADVANCED PRACTICE MIDWIFE

## 2018-12-03 PROCEDURE — 36415 COLL VENOUS BLD VENIPUNCTURE: CPT | Performed by: ADVANCED PRACTICE MIDWIFE

## 2018-12-03 PROCEDURE — 86780 TREPONEMA PALLIDUM: CPT | Performed by: ADVANCED PRACTICE MIDWIFE

## 2018-12-03 PROCEDURE — 87086 URINE CULTURE/COLONY COUNT: CPT | Performed by: ADVANCED PRACTICE MIDWIFE

## 2018-12-03 PROCEDURE — 86900 BLOOD TYPING SEROLOGIC ABO: CPT | Performed by: ADVANCED PRACTICE MIDWIFE

## 2018-12-03 PROCEDURE — 86762 RUBELLA ANTIBODY: CPT | Performed by: ADVANCED PRACTICE MIDWIFE

## 2018-12-03 PROCEDURE — 84702 CHORIONIC GONADOTROPIN TEST: CPT | Performed by: ADVANCED PRACTICE MIDWIFE

## 2018-12-03 PROCEDURE — 85027 COMPLETE CBC AUTOMATED: CPT | Performed by: ADVANCED PRACTICE MIDWIFE

## 2018-12-03 PROCEDURE — 86901 BLOOD TYPING SEROLOGIC RH(D): CPT | Performed by: ADVANCED PRACTICE MIDWIFE

## 2018-12-03 RX ORDER — PRENATAL VIT/IRON FUM/FOLIC AC 27MG-0.8MG
1 TABLET ORAL DAILY
COMMUNITY

## 2018-12-03 NOTE — PROGRESS NOTES
5w3d  Estimated Date of Delivery: Aug 2, 2019      Patient is accompanied by . Prenatal book and folder (containing standard educational hand-outs and brochures) given to patient. Information in folder reviewed. Questions answered.     Discussed and gave written information on options available for 1st and 2nd trimester screening, CVS, amniocentesis, AFP, and QUAD screen plus optimal time-frame for testing. Brochure given on optional screening available to determine Cystic Fibrosis carrier status. Pt instructed to check with insurance regarding coverage of these optional tests. Pt advised to call back if she desires testing or has any questions or concerns.    Prenatal labs obtained. New prenatal visit scheduled on 1/3 with Janine Alcazar.        Yajaira Dudley RN

## 2018-12-03 NOTE — MR AVS SNAPSHOT
After Visit Summary   12/3/2018    Seth Null    MRN: [id number]           Patient Information     Date Of Birth          1990        Visit Information        Provider Department      12/3/2018 2:00 PM RI PRENATAL NURSE [facility]        Today's Diagnoses     Encounter for supervision of normal first pregnancy in first trimester    -  1    Supervision of normal pregnancy           Follow-ups after your visit        Your next 10 appointments already scheduled     Dec 12, 2018 11:15 AM CST   [facility]    [address]  [address]  [facility] [id number] [phone]            Jan 03, 2019  8:00 AM CST   New Prenatal with DONOVAN Carpio CNM   [facility]    [address] [facility] [address] [phone]              Future tests that were ordered for you today     Open Future Orders        Priority Expected Expires Ordered    US OB < 14 Weeks w Transvaginal Routine  12/3/2019 12/3/2018            Who to contact     If you have questions or need follow up information about today's clinic visit or your schedule please contact [facility] directly at [phone].  Normal or non-critical lab and imaging results will be communicated to you by MyChart, letter or phone within 4 business days after the clinic has received the results. If you do not hear from us within 7 days, please contact the clinic through Loopd Viahart or phone. If you have a critical or abnormal lab result, we will notify you by phone as soon as possible.  Submit refill requests through Ameibo or call your pharmacy and they will forward the refill request to us. Please allow 3 business days for your refill to be completed.          Additional Information About Your Visit        Loopd ViaharYeapoo Information     Ameibo gives you secure access to your  electronic health record. If you see a primary care provider, you can also send messages to your care team and make appointments. If you have questions, please call your primary care clinic.  If you do not have a primary care provider, please call [phone] and they will assist you.        Care EveryWhere ID     This is your Care EveryWhere ID. This could be used by other organizations to access your [place] medical records  [id number]        Your Vitals Were     Last Period                   10/26/2018 (Exact Date)            Blood Pressure from Last 3 Encounters:   11/01/18 110/72   06/22/18 99/68   02/20/18 124/78    Weight from Last 3 Encounters:   11/01/18 135 lb (61.2 kg)   06/22/18 140 lb (63.5 kg)   02/20/18 143 lb (64.9 kg)              We Performed the Following     ABO/Rh type and screen     CBC with platelets     HCG quantitative pregnancy     Hepatitis B surface antigen     HIV Antigen Antibody Combo     Rubella Antibody IgG Quantitative     Treponema Abs w Reflex to RPR and Titer     Urine Culture Aerobic Bacterial        Primary Care Provider Office Phone # Fax #    Natasha Lamonte Koch [phone] [address] [facility] [address]        Equal Access to Services     LILIAM GARIBAY AH: Hadii melinda ku hadasho Soomaali, waaxda luqadaha, qaybta kaalmada adeegyada, jordyn fox. So [facility] [phone].    ATENCIÓN: Si habla español, tiene a sotelo disposición servicios gratuitos de asistencia lingüística. Llame al [phone].    We comply with applicable federal civil rights laws and [place] laws. We do not discriminate on the basis of race, color, national origin, age, disability, sex, sexual orientation, or gender identity.            Thank you!     Thank you for choosing [facility]  for your care. Our goal is always to provide you with excellent care. Hearing back from our patients is one way we can continue to improve our services.  Please take a few minutes to complete the written survey that you may receive in the mail after your visit with us. Thank you!             Your Updated Medication List - Protect others around you: Learn how to safely use, store and throw away your medicines at www.disposemymeds.org.          This list is accurate as of 12/3/18  3:39 PM.  Always use your most recent med list.                   Brand Name Dispense Instructions for use Diagnosis    prenatal multivitamin w/iron 27-0.8 MG tablet      Take 1 tablet by mouth daily

## 2018-12-04 LAB
B-HCG SERPL-ACNC: ABNORMAL IU/L (ref 0–5)
BACTERIA SPEC CULT: NORMAL
HBV SURFACE AG SERPL QL IA: NONREACTIVE
HIV 1+2 AB+HIV1 P24 AG SERPL QL IA: NONREACTIVE
RUBV IGG SERPL IA-ACNC: 44 IU/ML
SPECIMEN SOURCE: NORMAL
T PALLIDUM AB SER QL: NONREACTIVE

## 2018-12-12 DIAGNOSIS — Z34.01 ENCOUNTER FOR SUPERVISION OF NORMAL FIRST PREGNANCY IN FIRST TRIMESTER: ICD-10-CM

## 2018-12-18 ENCOUNTER — MYC MEDICAL ADVICE (OUTPATIENT)
Dept: OBGYN | Facility: CLINIC | Age: 28
End: 2018-12-18

## 2019-01-03 ENCOUNTER — PRENATAL OFFICE VISIT (OUTPATIENT)
Dept: OBGYN | Facility: CLINIC | Age: 29
End: 2019-01-03
Payer: COMMERCIAL

## 2019-01-03 VITALS
DIASTOLIC BLOOD PRESSURE: 78 MMHG | HEIGHT: 65 IN | SYSTOLIC BLOOD PRESSURE: 110 MMHG | WEIGHT: 143.8 LBS | BODY MASS INDEX: 23.96 KG/M2

## 2019-01-03 DIAGNOSIS — Z34.01 ENCOUNTER FOR SUPERVISION OF NORMAL FIRST PREGNANCY IN FIRST TRIMESTER: Primary | ICD-10-CM

## 2019-01-03 PROCEDURE — 99207 ZZC FIRST OB VISIT: CPT | Performed by: ADVANCED PRACTICE MIDWIFE

## 2019-01-03 PROCEDURE — 87491 CHLMYD TRACH DNA AMP PROBE: CPT | Performed by: ADVANCED PRACTICE MIDWIFE

## 2019-01-03 PROCEDURE — 87591 N.GONORRHOEAE DNA AMP PROB: CPT | Performed by: ADVANCED PRACTICE MIDWIFE

## 2019-01-03 ASSESSMENT — MIFFLIN-ST. JEOR: SCORE: 1383.15

## 2019-01-03 NOTE — PROGRESS NOTES
Seth Null is a 28 year old  ,  who is not a previous CNM patient. She presents for a new OB Visit. This was a planned pregnancy.     FATHER OF BABY is in good health.  FOB IS actively involved in relationship and this pregnancy.    Seth presents for her first care this pregnancy. She does not know if she desires CNM or physician care. PMH non-contributory. Labs and U/S WNL. History of breast reduction; patient uncertain if she desires to try to breastfeed. 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   She has not had bleeding since her LMP.    She denies abdominal pain since her LMP.  She has had nausea.  has not had vomiting.  Any personal or family history of blood clots? No  History of sickle cell anemia or trait? No         Patient's last menstrual period was 10/26/2018 (exact date)..  Estimated Date of Delivery: Aug 2, 2019 Ultrasound consistent with LMP.    MENSTRUAL HISTORY    frequency: every 28 days  Last PAP:    History of abnormal Pap?  No    Health maintenance updated:  yes        Current medications are:    Current Outpatient Medications:      Prenatal Vit-Fe Fumarate-FA (PRENATAL MULTIVITAMIN W/IRON) 27-0.8 MG tablet, Take 1 tablet by mouth daily, Disp: , Rfl:      INFECTION HISTORY  HIV: No  Hepatitis B: No  Hepatitis C: No  Tuberculosis: No    Genital Herpes self: no  Herpes partner:  no  Chlamydia:  no  Gonorrhea:  no  HPV: No  BV:  No  Syphilis:  No  Chicken Pox:  No      OB HISTORY  Obstetric History       T0      L0     SAB0   TAB0   Ectopic0   Multiple0   Live Births0       # Outcome Date GA Lbr Flavio/2nd Weight Sex Delivery Anes PTL Lv   1 Current                   History of GDM: No,  PTL : No,  History of HTN in pregnancy: No,  Thrombocytopenia: No,  Shoulder dystocia: No,  Vacuum Extraction: No  PPH: No   3rd of 4th degree laceration: No.   Other complications: No    PERSONAL HISTORY  Exercise Habits:  walking irregularly  days per week.  Employment: Full time.  Her job involves light activity and moderate activity with no potential for toxic exposure.    Travel plans:  are none planned.   Diet: eats regular meals and takes daily vitamins  Abuse concerns? No  Hgb A1c screen:  BMI > 30: No, 1st degree family DM: No, History of GDM: No, PCOS: No, High risk ethnicity: No    Social History     Socioeconomic History     Marital status:      Spouse name: Luis F Null     Number of children: Not on file     Years of education: Not on file     Highest education level: Not on file   Social Needs     Financial resource strain: Not on file     Food insecurity - worry: Not on file     Food insecurity - inability: Not on file     Transportation needs - medical: Not on file     Transportation needs - non-medical: Not on file   Occupational History     Occupation: special  w      Employer: [email or web address]   Tobacco Use     Smoking status: Never Smoker     Smokeless tobacco: Never Used   Substance and Sexual Activity     Alcohol use: No     Drug use: No     Sexual activity: Yes     Partners: Male   Other Topics Concern     Parent/sibling w/ CABG, MI or angioplasty before 65F 55M? No   Social History Narrative     Not on file         She  reports that  has never smoked. she has never used smokeless tobacco.    STD testing offered?  Accepted  Last PHQ-9 score on record = No flowsheet data found.  Last GAD7 score on record = No flowsheet data found.  Alcohol Score = 0  Referral/Meds needed? no    PAST MEDICAL/SURGICAL HISTORY  Past Medical History:   Diagnosis Date     NO ACTIVE PROBLEMS (aka NONE)      Past Surgical History:   Procedure Laterality Date     MAMMOPLASTY REDUCTION         FAMILY HISTORY  Family History   Problem Relation Age of Onset     Hypertension Father      Cancer Maternal Grandfather         metastatic         ROS:  12 point review of systems negative other than symptoms noted below.      PHYSICAL  "EXAM  Vitals: /78   Ht 1.651 m (5' 5\")   Wt 65.2 kg (143 lb 12.8 oz)   LMP 10/26/2018 (Exact Date)   BMI 23.93 kg/m    BMI= Body mass index is 23.93 kg/m .     GENERAL:  28 year old pleasant pregnant female, alert, cooperative and well groomed.  NECK:  Thyroid without enlargement and nodules.  Lymph nodes not palpable.   LUNGS:  Clear to auscultation.  BREAST:  Symmetrical without lesions or nodes.  Nipples flat.  Areolas symmetric.  No palpable axillary nodes.  HEART:  RRR without murmur.  ABDOMEN: Soft without masses or tenderness.  No scars noted..  GENITALIA: BUS without tenderness or inflammation.  Perineum without lesions.    VAGINA:  Pink, normal rugae and discharge.  CERVIX:  Posterior, smooth, without discharge, and firm/ closed.   UTERUS: Anteverted, nontender 10 weeks in size.  ADNEXA: Without masses or tenderness  RECTAL:  Normal appearance.  Digital exam deferred.  LOWER EXTREMITIES: No edema. No significant varicosities.    ASSESSMENT/PLAN:    IUP at 9w6d    ICD-10-CM    1. Encounter for supervision of normal first pregnancy in first trimester Z34.01 NEISSERIA GONORRHOEA PCR     CHLAMYDIA TRACHOMATIS PCR        consult for US for AMA patients: NA  Genetic Testing reviewed and discussed, patient considering options.     COUNSELING    Instructed on use of triage nurse line and contacting the on call CNM after hours in an emergency.     Symptoms of N&V and fatigue usually start to resolve around 12-16 weeks     Reviewed CNM philosophy, call schedule for labor and delivery, and FSH for delivery    1st OB handout given outlining appointment spacing and CNM information    Reviewed exercise and nutrition    Recommend to gain 20 pounds with her pregnancy.    Discussed OTC medications. OB med list given    Encouraged patient to arrange  if needed    Encouraged patient to take PNV's/DHA    Travel precautions discussed, no air travel after 36 weeks and Zika Virus discussed    Will call " patient with lab results when available    Does patient desire a RN home visit from the [facility]?  No    If yes, paperwork completed?  No     F/U to be addressed next visit:  genetics testing, Rx's given, referrals    Will return to the clinic in 4 weeks for her next routine prenatal check.  Will call to be seen sooner if problems arise.    Reviewed differences between CNM and physician care. Discussed genetics testing options. Patient will give us decisions on these at next visit or sooner. Reviewed safety at work. Note given to avoid situations where she could experience trauma to the belly. GC/CT collected.     Janine PARKERM

## 2019-01-03 NOTE — LETTER
January 3, 2019      Seth Null  [address] [id number]        To Whom It May Concern,     Patient is pregnant under our care. Due to pregnancy, patient should avoid situations where any trauma to her abdomen would occur.       Sincerely,        DONOVAN Khoury CNM

## 2019-01-03 NOTE — NURSING NOTE
"Chief Complaint   Patient presents with     Prenatal Care     9w6d       Initial /78   Ht 1.651 m (5' 5\")   Wt 65.2 kg (143 lb 12.8 oz)   LMP 10/26/2018 (Exact Date)   BMI 23.93 kg/m   Estimated body mass index is 23.93 kg/m  as calculated from the following:    Height as of this encounter: 1.651 m (5' 5\").    Weight as of this encounter: 65.2 kg (143 lb 12.8 oz).  BP completed using cuff size: regular    Questioned patient about current smoking habits.  Pt. has never smoked.          The following HM Due: NONE      The following patient reported/Care Every where data was sent to:  P ABSTRACT QUALITY INITIATIVES [42578]    Luciana Morales MA             "

## 2019-01-04 LAB
C TRACH DNA SPEC QL NAA+PROBE: NEGATIVE
N GONORRHOEA DNA SPEC QL NAA+PROBE: NEGATIVE
SPECIMEN SOURCE: NORMAL
SPECIMEN SOURCE: NORMAL

## 2019-01-16 ENCOUNTER — ALLIED HEALTH/NURSE VISIT (OUTPATIENT)
Dept: NURSING | Facility: CLINIC | Age: 29
End: 2019-01-16
Payer: COMMERCIAL

## 2019-01-16 DIAGNOSIS — Z34.01 ENCOUNTER FOR SUPERVISION OF NORMAL FIRST PREGNANCY IN FIRST TRIMESTER: Primary | ICD-10-CM

## 2019-01-16 PROCEDURE — 99000 SPECIMEN HANDLING OFFICE-LAB: CPT | Performed by: ADVANCED PRACTICE MIDWIFE

## 2019-01-16 PROCEDURE — 40000791 ZZHCL STATISTIC VERIFI PRENATAL TRISOMY 21,18,13: Mod: 90 | Performed by: ADVANCED PRACTICE MIDWIFE

## 2019-01-16 PROCEDURE — 99207 ZZC NO CHARGE NURSE ONLY: CPT

## 2019-01-16 PROCEDURE — 36415 COLL VENOUS BLD VENIPUNCTURE: CPT | Performed by: ADVANCED PRACTICE MIDWIFE

## 2019-01-22 ENCOUNTER — TELEPHONE (OUTPATIENT)
Dept: OBGYN | Facility: CLINIC | Age: 29
End: 2019-01-22

## 2019-01-22 NOTE — TELEPHONE ENCOUNTER
Results received from [facility] testing in [facility].  Testing done:  Innatal Prenatal Screen    Action:  LM for pt to cb to report NORMAL results.    Gender: **GIRL**  Will ask pt if they wish to know the gender.    Please route to provider as FYI once pt is informed. (MM)    Randa SCHROEDER, RN

## 2019-01-22 NOTE — TELEPHONE ENCOUNTER
Results relayed to patient. No additional questions. Did not want to know the gender at this time.

## 2019-02-07 ENCOUNTER — PRENATAL OFFICE VISIT (OUTPATIENT)
Dept: OBGYN | Facility: CLINIC | Age: 29
End: 2019-02-07
Payer: COMMERCIAL

## 2019-02-07 VITALS — BODY MASS INDEX: 24.3 KG/M2 | SYSTOLIC BLOOD PRESSURE: 108 MMHG | WEIGHT: 146 LBS | DIASTOLIC BLOOD PRESSURE: 68 MMHG

## 2019-02-07 DIAGNOSIS — Z34.02 ENCOUNTER FOR SUPERVISION OF NORMAL FIRST PREGNANCY IN SECOND TRIMESTER: Primary | ICD-10-CM

## 2019-02-07 PROCEDURE — 99207 ZZC PRENATAL VISIT: CPT | Performed by: ADVANCED PRACTICE MIDWIFE

## 2019-02-07 NOTE — PROGRESS NOTES
S:Feels well.   Fetal movement Yes  Denies loss of fluid/vb/contractions/pelvic pain  Depression screening done  O:  /68 (BP Location: Right arm, Patient Position: Sitting, Cuff Size: Adult Regular)   Wt 66.2 kg (146 lb)   LMP 10/26/2018 (Exact Date)   BMI 24.30 kg/m    Exam:  Constitutional: healthy, alert and no distress  Respiratory: Respirations even and unlabored  Gastrointestinal: Abdomen soft, non-tender. Fundus measures appropriately for gestational age. Fetal heart tones heard easily.  Psychiatric: mentation appears normal and affect normal/bright  A: (Z34.02) Encounter for supervision of normal first pregnancy in second trimester  (primary encounter diagnosis)  Plan: US OB > 14 Weeks  P: Discussed options for quad screen today  declined quad screen today. Progenity WNL.   Anatomy ultrasound next visit between 20-22 weeks  Return to clinic 4 weeks    Janine Alcazar CNM

## 2019-02-07 NOTE — NURSING NOTE
"Chief Complaint   Patient presents with     Prenatal Care     14 weeks 6 days- no concerns        Initial /68 (BP Location: Right arm, Patient Position: Sitting, Cuff Size: Adult Regular)   Wt 66.2 kg (146 lb)   LMP 10/26/2018 (Exact Date)   BMI 24.30 kg/m   Estimated body mass index is 24.3 kg/m  as calculated from the following:    Height as of 1/3/19: 1.651 m (5' 5\").    Weight as of this encounter: 66.2 kg (146 lb).  BP completed using cuff size: regular    Questioned patient about current smoking habits.  Pt. has never smoked.          The following HM Due: NONE    14w6d  Arnold Miles CMA              "

## 2019-02-21 ENCOUNTER — OFFICE VISIT (OUTPATIENT)
Dept: FAMILY MEDICINE | Facility: CLINIC | Age: 29
End: 2019-02-21
Payer: COMMERCIAL

## 2019-02-21 VITALS
RESPIRATION RATE: 16 BRPM | HEIGHT: 65 IN | DIASTOLIC BLOOD PRESSURE: 78 MMHG | OXYGEN SATURATION: 99 % | TEMPERATURE: 99.5 F | SYSTOLIC BLOOD PRESSURE: 130 MMHG | WEIGHT: 150 LBS | HEART RATE: 105 BPM | BODY MASS INDEX: 24.99 KG/M2

## 2019-02-21 DIAGNOSIS — R07.0 THROAT PAIN: ICD-10-CM

## 2019-02-21 DIAGNOSIS — J06.9 VIRAL URI WITH COUGH: ICD-10-CM

## 2019-02-21 DIAGNOSIS — M67.431 GANGLION CYST OF WRIST, RIGHT: Primary | ICD-10-CM

## 2019-02-21 LAB
DEPRECATED S PYO AG THROAT QL EIA: NORMAL
SPECIMEN SOURCE: NORMAL

## 2019-02-21 PROCEDURE — 87081 CULTURE SCREEN ONLY: CPT | Performed by: FAMILY MEDICINE

## 2019-02-21 PROCEDURE — 87880 STREP A ASSAY W/OPTIC: CPT | Performed by: FAMILY MEDICINE

## 2019-02-21 PROCEDURE — 99213 OFFICE O/P EST LOW 20 MIN: CPT | Performed by: FAMILY MEDICINE

## 2019-02-21 ASSESSMENT — MIFFLIN-ST. JEOR: SCORE: 1411.28

## 2019-02-21 NOTE — PROGRESS NOTES
"  SUBJECTIVE:   Seth Null is a 28 year old female who presents to clinic today for the following health issues:      1. Mass on the right wrist for the past 2 months. Some days seems smaller. Never larger. Not painful. No history of same. No trauma.     2. 4 days of nasal congestion and a couple days of cough. Mild fevers. No ear pain, sinus pressure. Waking in AM with sore throat, seems to improve as day goes on. 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 17 weeks pregnant. Had flu shot.       Problem list and histories reviewed & adjusted, as indicated.  Additional history: none    Patient Active Problem List   Diagnosis     Female infertility     Past Surgical History:   Procedure Laterality Date     MAMMOPLASTY REDUCTION         Social History     Tobacco Use     Smoking status: Never Smoker     Smokeless tobacco: Never Used   Substance Use Topics     Alcohol use: No     Family History   Problem Relation Age of Onset     Hypertension Father      Cancer Maternal Grandfather         metastatic           Reviewed and updated as needed this visit by clinical staff       Reviewed and updated as needed this visit by Provider         ROS:  Constitutional, HEENT, cardiovascular, pulmonary, gi and gu systems are negative, except as otherwise noted.    OBJECTIVE:     /78 (BP Location: Left arm, Patient Position: Sitting, Cuff Size: Adult Regular)   Pulse 105   Temp 99.5  F (37.5  C) (Oral)   Resp 16   Ht 1.651 m (5' 5\")   Wt 68 kg (150 lb)   LMP 10/26/2018 (Exact Date)   SpO2 99%   BMI 24.96 kg/m    Body mass index is 24.96 kg/m .  GENERAL: healthy, alert and no distress  HENT: ear canals and TM's normal, nose and mouth without ulcers or lesions, moderate pharyngitis with no tonsillar hypertrophy or exudate  NECK: nontender anterior chain cervical adenopathy  RESP: Good air entry, lung sounds symmetric, lungs clear to auscultation - no rales, rhonchi or wheezes  CV: regular rate and rhythm, normal S1 " S2, no S3 or S4, no murmur, click or rub  SKIN: firm ballotable mass on the ventral right lateral wrist    Diagnostic Test Results:  Rapid strep - negative    ASSESSMENT/PLAN:     1. Ganglion cyst of wrist, right -[place] diagnosis, can continue to monitor, no need to treat    2. Viral URI with cough -discussed symptomatic management with Tylenol and Delsym as needed for cough    3. Throat pain -negative rapid strep today, will await culture  - Strep, Rapid Screen    Natasha Koch MD  [facility]

## 2019-02-21 NOTE — PATIENT INSTRUCTIONS
Patient Education     Ganglion Cyst    A ganglion cyst usually is a painless bump on the wrist or finger joint. It connects to the joint capsule and grows like a balloon on a stalk. It is filled with joint fluid. The cause of a ganglion cyst is not known.   If the cyst puts pressure on a nearby nerve it may cause pain. Otherwise, cysts are painless and harmless. Most tend to disappear over time without treatment. Don't try to drain or break the cyst at home. This can cause harm and usually does not cure the problem.  If you are having pain from the cyst, a temporary wrist splint may be helpful to limit wrist motion. If this does not help, the fluid can be removed from the cyst. This should shrink the size of the cyst. If this doesn t give relief, the ganglion can be removed by surgery.  Home care    If you are having wrist pain, use a wrist splint for 1 to 2 weeks at a time. You can buy one at many drug stores without a prescription.    You may use over-the-counter pain medicine to control pain, unless another medicine was prescribed. If you have chronic liver or kidney disease or ever had a stomach ulcer or gastrointestinal bleeding, talk with your healthcare provider before using these medicines.      If a needle was used to drain the cyst fluid or inject medicine into it, keep the site clean and covered with a bandage for the first 24 hours. If a pressure dressing was applied, leave it in place for the time advised.  Follow-up care  Follow up with your healthcare provider, or as advised. Make an appointment for a repeat exam if pain continues for more than 2 weeks in a wrist splint.  When to seek medical advice  Call your healthcare provider right away if any of these occur:    Increasing pain in the wrist    Redness or warmth over the cyst    Fluid draining from the cyst    Numbness or tingling in the hand or arm   Date Last Reviewed: 5/1/2018 2000-2018 [employer]. [address] [facility],  [place] [address]. All rights reserved. This information is not intended as a substitute for professional medical care. Always follow your healthcare professional's instructions.

## 2019-02-22 LAB
BACTERIA SPEC CULT: NORMAL
SPECIMEN SOURCE: NORMAL

## 2019-03-04 ENCOUNTER — MYC MEDICAL ADVICE (OUTPATIENT)
Dept: OBGYN | Facility: CLINIC | Age: 29
End: 2019-03-04

## 2019-03-07 ENCOUNTER — PRENATAL OFFICE VISIT (OUTPATIENT)
Dept: OBGYN | Facility: CLINIC | Age: 29
End: 2019-03-07
Payer: COMMERCIAL

## 2019-03-07 VITALS — DIASTOLIC BLOOD PRESSURE: 72 MMHG | BODY MASS INDEX: 24.96 KG/M2 | WEIGHT: 150 LBS | SYSTOLIC BLOOD PRESSURE: 118 MMHG

## 2019-03-07 DIAGNOSIS — Z34.02 ENCOUNTER FOR SUPERVISION OF NORMAL FIRST PREGNANCY IN SECOND TRIMESTER: Primary | ICD-10-CM

## 2019-03-07 PROCEDURE — 99207 ZZC PRENATAL VISIT: CPT | Performed by: ADVANCED PRACTICE MIDWIFE

## 2019-03-07 NOTE — PROGRESS NOTES
S:Feels well. Reviewed weight lifting restrictions for work.   Fetal movement Yes  Denies loss of fluid/vb/contractions/pelvic pain  Depression screening done  O:  /72 (BP Location: Right arm, Patient Position: Sitting, Cuff Size: Adult Regular)   Wt 68 kg (150 lb)   LMP 10/26/2018 (Exact Date)   BMI 24.96 kg/m    Exam:  Constitutional: healthy, alert and no distress  Respiratory: Respirations even and unlabored  Gastrointestinal: Abdomen soft, non-tender. Fundus measures appropriately for gestational age. Fetal heart tones heard easily.  Psychiatric: mentation appears normal and affect normal/bright  A: (Z34.02) Encounter for supervision of normal first pregnancy in second trimester  (primary encounter diagnosis)  P:  Anatomy ultrasound next visit between 20-22 weeks, scheduled.   Return to clinic 4 weeks    Janine Alcazar CNM

## 2019-03-07 NOTE — NURSING NOTE
"Chief Complaint   Patient presents with     Prenatal Care     18 weeks 6 days- no concerns        Initial /72 (BP Location: Right arm, Patient Position: Sitting, Cuff Size: Adult Regular)   Wt 68 kg (150 lb)   LMP 10/26/2018 (Exact Date)   BMI 24.96 kg/m   Estimated body mass index is 24.96 kg/m  as calculated from the following:    Height as of 19: 1.651 m (5' 5\").    Weight as of this encounter: 68 kg (150 lb).  BP completed using cuff size: regular    Questioned patient about current smoking habits.  Pt. has never smoked.          The following HM Due: NONE    18w6d  Arnold Miles CMA              "

## 2019-03-11 DIAGNOSIS — Z34.02 ENCOUNTER FOR SUPERVISION OF NORMAL FIRST PREGNANCY IN SECOND TRIMESTER: ICD-10-CM

## 2019-04-11 ENCOUNTER — PRENATAL OFFICE VISIT (OUTPATIENT)
Dept: OBGYN | Facility: CLINIC | Age: 29
End: 2019-04-11
Payer: COMMERCIAL

## 2019-04-11 VITALS — DIASTOLIC BLOOD PRESSURE: 72 MMHG | SYSTOLIC BLOOD PRESSURE: 132 MMHG | BODY MASS INDEX: 26.63 KG/M2 | WEIGHT: 160 LBS

## 2019-04-11 DIAGNOSIS — Z34.02 ENCOUNTER FOR SUPERVISION OF NORMAL FIRST PREGNANCY IN SECOND TRIMESTER: Primary | ICD-10-CM

## 2019-04-11 PROCEDURE — 99207 ZZC PRENATAL VISIT: CPT | Performed by: ADVANCED PRACTICE MIDWIFE

## 2019-04-11 NOTE — PROGRESS NOTES
S: Feels well,  Baby active.  Denies uterine cramping, vaginal bleeding or leaking of fluid. Anatomy scan WNL.   O: Vitals: /72 (BP Location: Right arm, Patient Position: Sitting, Cuff Size: Adult Regular)   Wt 72.6 kg (160 lb)   LMP 10/26/2018 (Exact Date)   BMI 26.63 kg/m    BMI= Body mass index is 26.63 kg/m .  Exam:  Constitutional: healthy, alert and no distress  Respiratory: respirations even and unlabored  Gastrointestinal: Abdomen soft, non-tender. Fundus measures appropriate for gestational age. Fetal heart tones hear without difficulty and within normal limits  : Deferred  Psychiatric: mentation appears normal and affect normal/bright  A:    Diagnosis Comments   1. Encounter for supervision of normal first pregnancy in second trimester       P: Discussed GCT/repeat RPR for next visit, handout provided, reminded of longer appointment.  Tdap next visit; reviewed CDC recommendations and partner/family vaccination recommended as well.  Need for Rhogam? Yes, to be done next visit   Encouraged patient to call with any questions or concerns.  Return to clinic 4 weeks    Janine Alcazar CNM

## 2019-04-11 NOTE — NURSING NOTE
"Chief Complaint   Patient presents with     Prenatal Care     23 weeks 6 days- no concerns        Initial /72 (BP Location: Right arm, Patient Position: Sitting, Cuff Size: Adult Regular)   Wt 72.6 kg (160 lb)   LMP 10/26/2018 (Exact Date)   BMI 26.63 kg/m   Estimated body mass index is 26.63 kg/m  as calculated from the following:    Height as of 19: 1.651 m (5' 5\").    Weight as of this encounter: 72.6 kg (160 lb).  BP completed using cuff size: regular    Questioned patient about current smoking habits.  Pt. has never smoked.          The following HM Due: NONE    23w6d  Arnold Miles CMA              "

## 2019-04-24 ENCOUNTER — MYC MEDICAL ADVICE (OUTPATIENT)
Dept: OBGYN | Facility: CLINIC | Age: 29
End: 2019-04-24

## 2019-04-24 NOTE — TELEPHONE ENCOUNTER
Please see the [employer] message. Would you like pt to have any monitoring/tests done, or just monitor at home?      Yajaira MOY RN

## 2019-04-28 ENCOUNTER — MYC MEDICAL ADVICE (OUTPATIENT)
Dept: OBGYN | Facility: CLINIC | Age: 29
End: 2019-04-28

## 2019-04-28 ENCOUNTER — NURSE TRIAGE (OUTPATIENT)
Dept: NURSING | Facility: CLINIC | Age: 29
End: 2019-04-28

## 2019-04-28 NOTE — TELEPHONE ENCOUNTER
"Patient calling, 26 weeks pregnant and noticed white/clear vaginal discharge today. Baby moving normal. No other symptoms. Reviewed symptoms to monitor for.    Additional Information    Negative: [1] Vaginal bleeding AND [2] pregnant > 20 weeks    Negative: [1] Vaginal bleeding AND [2] pregnant < 20 weeks    Negative: [1] Having contractions or other symptoms of labor AND [2] < 37 weeks pregnant (i.e., )    Negative: [1] Having contractions or other symptoms of labor AND [2] > 36 weeks pregnant (i.e., term pregnancy)    Negative: Leakage of fluid (trickle, gush) from the vagina    Negative: Foreign body in vagina (e.g., tampon)    Negative: Pain or burning with urination is main symptom    Negative: [1] Pregnant 23 or more weeks AND [2] baby is moving less today (e.g., kick count < 5 in 1 hour or < 10 in 2 hours)    Negative: Patient sounds very sick or weak to the triager    Negative: [1] Constant abdominal pain AND [2] present > 2 hours    Negative: [1] Intermittent lower abdominal pain AND [2] present > 24 hours    Negative: [1] Pregnant 24-36 weeks () AND [2] pinkish or brownish mucous discharge    Negative: [1] Yellow or green vaginal discharge AND [2] fever    Negative: Painful rash with tiny water blisters    Negative: [1] Rash (e.g., redness, tiny bumps, sore) of genital area AND [2] present > 24 hours    Negative: Abnormal color vaginal discharge (i.e., yellow, green, gray)    Negative: Bad smelling vaginal discharge    Negative: Tender lump (swelling or \"ball\") at vaginal opening    Negative: [1] Symptoms of a \"yeast infection\" (i.e., itchy, white discharge, not bad smelling) AND [2] not improved > 3 days following CARE ADVICE    Negative: Patient is worried about sexually transmitted disease (STD)    Negative: Pain with sexual intercourse (dyspareunia)    Negative: [1] Pregnant > 36 weeks (term) AND [2] pinkish or brownish mucous discharge (all triage questions negative)    Negative: [1] " Pregnant > 36 weeks (term) AND [2] passed a small glob or chunk of mucous (may look like gelatin or snot) (all triage questions negative)    Negative: [1] Rash (e.g., redness, tiny bumps, sore) of genital area AND [2] present < 24 hours    Negative: Symptoms of a vaginal yeast infection (i.e., white, thick, cottage-cheese-like, itchy, not bad smelling discharge)    Normal vaginal discharge in pregnancy (all triage questions negative)    Protocols used: PREGNANCY - VAGINAL DISCHARGE-ADULT-

## 2019-05-10 ENCOUNTER — PRENATAL OFFICE VISIT (OUTPATIENT)
Dept: OBGYN | Facility: CLINIC | Age: 29
End: 2019-05-10
Payer: COMMERCIAL

## 2019-05-10 VITALS — WEIGHT: 166 LBS | DIASTOLIC BLOOD PRESSURE: 62 MMHG | BODY MASS INDEX: 27.62 KG/M2 | SYSTOLIC BLOOD PRESSURE: 120 MMHG

## 2019-05-10 DIAGNOSIS — O26.893 RH NEGATIVE STATUS DURING PREGNANCY IN THIRD TRIMESTER: ICD-10-CM

## 2019-05-10 DIAGNOSIS — Z67.91 RH NEGATIVE STATUS DURING PREGNANCY IN THIRD TRIMESTER: ICD-10-CM

## 2019-05-10 DIAGNOSIS — Z34.81 ENCOUNTER FOR SUPERVISION OF OTHER NORMAL PREGNANCY IN FIRST TRIMESTER: Primary | ICD-10-CM

## 2019-05-10 DIAGNOSIS — Z23 NEED FOR TDAP VACCINATION: ICD-10-CM

## 2019-05-10 LAB
BLD GP AB SCN SERPL QL: NORMAL
ERYTHROCYTE [DISTWIDTH] IN BLOOD BY AUTOMATED COUNT: 14.1 % (ref 10–15)
GLUCOSE 1H P 50 G GLC PO SERPL-MCNC: 155 MG/DL (ref 60–129)
HCT VFR BLD AUTO: 38.7 % (ref 35–47)
HGB BLD-MCNC: 13.2 G/DL (ref 11.7–15.7)
MCH RBC QN AUTO: 30.3 PG (ref 26.5–33)
MCHC RBC AUTO-ENTMCNC: 34.1 G/DL (ref 31.5–36.5)
MCV RBC AUTO: 89 FL (ref 78–100)
PLATELET # BLD AUTO: 219 10E9/L (ref 150–450)
RBC # BLD AUTO: 4.36 10E12/L (ref 3.8–5.2)
WBC # BLD AUTO: 8.5 10E9/L (ref 4–11)

## 2019-05-10 PROCEDURE — 90471 IMMUNIZATION ADMIN: CPT | Performed by: ADVANCED PRACTICE MIDWIFE

## 2019-05-10 PROCEDURE — 36415 COLL VENOUS BLD VENIPUNCTURE: CPT | Performed by: ADVANCED PRACTICE MIDWIFE

## 2019-05-10 PROCEDURE — 99207 ZZC PRENATAL VISIT: CPT | Performed by: ADVANCED PRACTICE MIDWIFE

## 2019-05-10 PROCEDURE — 85027 COMPLETE CBC AUTOMATED: CPT | Performed by: ADVANCED PRACTICE MIDWIFE

## 2019-05-10 PROCEDURE — 90715 TDAP VACCINE 7 YRS/> IM: CPT | Performed by: ADVANCED PRACTICE MIDWIFE

## 2019-05-10 PROCEDURE — 82950 GLUCOSE TEST: CPT | Performed by: ADVANCED PRACTICE MIDWIFE

## 2019-05-10 PROCEDURE — 96372 THER/PROPH/DIAG INJ SC/IM: CPT | Performed by: ADVANCED PRACTICE MIDWIFE

## 2019-05-10 PROCEDURE — 86780 TREPONEMA PALLIDUM: CPT | Performed by: ADVANCED PRACTICE MIDWIFE

## 2019-05-10 PROCEDURE — 86850 RBC ANTIBODY SCREEN: CPT | Performed by: ADVANCED PRACTICE MIDWIFE

## 2019-05-10 NOTE — PROGRESS NOTES
S: Feels good, no concerns or questions at this time  Baby active.  Denies uterine cramping, vaginal bleeding or leaking of fluid.  O: Vitals: /62 (BP Location: Left arm, Cuff Size: Adult Regular)   Wt 75.3 kg (166 lb)   LMP 10/26/2018 (Exact Date)   BMI 27.62 kg/m    BMI= Body mass index is 27.62 kg/m .  Exam:  Constitutional: healthy, alert and no distress  Respiratory: respirations even and unlabored  Gastrointestinal: Abdomen soft, non-tender. Fundus measures appropriate for gestational age. Fetal heart tones hear without difficulty and within normal limits  : Deferred  Psychiatric: mentation appears normal and affect normal/bright  A:     ICD-10-CM    1. Encounter for supervision of other normal pregnancy in first trimester Z34.81    2. Supervision of normal pregnancy Z34.90 CBC with platelets     Treponema Abs w Reflex to RPR and Titer     Antibody screen red cell     Glucose tolerance, gest screen, 1 hour     rho(D) immune globulin (HYPERRHO/RHOGAM) injection 300 mcg     INJECTION INTRAMUSCULAR OR SUB-Q     TDAP, IM (10 - 64 YRS) - Adacel     ADMIN 1st VACCINE   3. Rh negative status during pregnancy O09.899 Antibody screen red cell    Z67.91 rho(D) immune globulin (HYPERRHO/RHOGAM) injection 300 mcg     INJECTION INTRAMUSCULAR OR SUB-Q   4. Need for Tdap vaccination Z23 TDAP, IM (10 - 64 YRS) - Adacel     ADMIN 1st VACCINE     P: GCT/repeat RPR today, handout provided.  Tdap given; reviewed CDC recommendations and partner/family vaccination recommended as well.  Need for Rhogam? Yes: given.   Discussed patient options for postpartum contraception. Patient is planning unsure, but will likely resume OCPs  Encouraged patient to call with any questions or concerns.  Return to clinic 2 weeks    Ana Miles CNM on 5/10/2019 at 8:15 AM

## 2019-05-11 LAB — T PALLIDUM AB SER QL: NONREACTIVE

## 2019-05-13 DIAGNOSIS — R73.09 ABNORMAL GLUCOSE TOLERANCE TEST: Primary | ICD-10-CM

## 2019-05-15 DIAGNOSIS — R73.09 ABNORMAL GLUCOSE TOLERANCE TEST: ICD-10-CM

## 2019-05-15 PROCEDURE — 82951 GLUCOSE TOLERANCE TEST (GTT): CPT | Performed by: ADVANCED PRACTICE MIDWIFE

## 2019-05-15 PROCEDURE — 36415 COLL VENOUS BLD VENIPUNCTURE: CPT | Performed by: ADVANCED PRACTICE MIDWIFE

## 2019-05-15 PROCEDURE — 82952 GTT-ADDED SAMPLES: CPT | Performed by: ADVANCED PRACTICE MIDWIFE

## 2019-05-16 LAB
GLUCOSE 1H P 100 G GLC PO SERPL-MCNC: 148 MG/DL (ref 60–179)
GLUCOSE 2H P 100 G GLC PO SERPL-MCNC: 158 MG/DL (ref 60–154)
GLUCOSE 3H P 100 G GLC PO SERPL-MCNC: 152 MG/DL (ref 60–139)
GLUCOSE P FAST SERPL-MCNC: 79 MG/DL (ref 60–94)

## 2019-05-17 ENCOUNTER — TELEPHONE (OUTPATIENT)
Dept: OBGYN | Facility: CLINIC | Age: 29
End: 2019-05-17

## 2019-05-17 DIAGNOSIS — O24.410 DIET CONTROLLED GESTATIONAL DIABETES MELLITUS (GDM) IN THIRD TRIMESTER: Primary | ICD-10-CM

## 2019-05-17 NOTE — TELEPHONE ENCOUNTER
Spoke to patient on the phone to review 3hr GTT results. She is diagnosed with GDM. Discussed implications with patient. She will start testing BS q4x/day. Educated on testing and diet modifications. Referral for diabetic education placed. Patient verbalizes understanding and agrees with plan. Janine Alcazar CNM

## 2019-05-23 ENCOUNTER — PRENATAL OFFICE VISIT (OUTPATIENT)
Dept: OBGYN | Facility: CLINIC | Age: 29
End: 2019-05-23
Payer: COMMERCIAL

## 2019-05-23 ENCOUNTER — TRANSCRIBE ORDERS (OUTPATIENT)
Dept: MATERNAL FETAL MEDICINE | Facility: CLINIC | Age: 29
End: 2019-05-23

## 2019-05-23 VITALS — BODY MASS INDEX: 28.66 KG/M2 | SYSTOLIC BLOOD PRESSURE: 116 MMHG | DIASTOLIC BLOOD PRESSURE: 74 MMHG | WEIGHT: 172.2 LBS

## 2019-05-23 DIAGNOSIS — O26.90 PREGNANCY RELATED CONDITION, ANTEPARTUM: Primary | ICD-10-CM

## 2019-05-23 DIAGNOSIS — O24.410 DIET CONTROLLED GESTATIONAL DIABETES MELLITUS (GDM) IN THIRD TRIMESTER: Primary | ICD-10-CM

## 2019-05-23 PROCEDURE — 99207 ZZC PRENATAL VISIT: CPT | Performed by: ADVANCED PRACTICE MIDWIFE

## 2019-05-23 NOTE — NURSING NOTE
"Chief Complaint   Patient presents with     Prenatal Care     29 weeks 6 days       Initial /74 (BP Location: Right arm, Patient Position: Chair, Cuff Size: Adult Regular)   Wt 78.1 kg (172 lb 3.2 oz)   LMP 10/26/2018 (Exact Date)   Breastfeeding? No   BMI 28.66 kg/m   Estimated body mass index is 28.66 kg/m  as calculated from the following:    Height as of 19: 1.651 m (5' 5\").    Weight as of this encounter: 78.1 kg (172 lb 3.2 oz).  BP completed using cuff size: regular    Questioned patient about current smoking habits.  Pt. has never smoked.    29w6d      The following HM Due: NONE      +FM daily       Jo-Ann Jamison CMA on 2019 at 3:16 PM     "

## 2019-05-23 NOTE — PROGRESS NOTES
S: Feels well,  Baby active.  Denies uterine cramping, vaginal bleeding or leaking of fluid. Has started testing her blood sugars. All WNL, except for one value after she had cake for dessert. Has not gone to diabetic education yet, reordered referral and gave patient number to call, strongly encouraged patient to call can schedule. M referral placed.   O: Vitals: /74 (BP Location: Right arm, Patient Position: Chair, Cuff Size: Adult Regular)   Wt 78.1 kg (172 lb 3.2 oz)   LMP 10/26/2018 (Exact Date)   Breastfeeding? No   BMI 28.66 kg/m    BMI= Body mass index is 28.66 kg/m .  Exam:  Constitutional: healthy, alert and no distress  Respiratory: respirations even and unlabored  Gastrointestinal: Abdomen soft, non-tender. Fundus measures appropriate for gestational age. Fetal heart tones hear without difficulty and within normal limits  : Deferred  Psychiatric: mentation appears normal and affect normal/bright  A:     ICD-10-CM    1. Diet controlled gestational diabetes mellitus (GDM) in third trimester O24.410 DIABETES EDUCATOR REFERRAL     MAT FETAL MED CTR REFERRAL-PREGNANCY     P: Discussed plans for labor.   Encouraged patient to call with any questions or concerns.  Return to clinic 2 weeks    Janine Alcazar CNM

## 2019-05-29 ENCOUNTER — AMBULATORY - HEALTHEAST (OUTPATIENT)
Dept: MATERNAL FETAL MEDICINE | Facility: HOSPITAL | Age: 29
End: 2019-05-29

## 2019-05-29 DIAGNOSIS — O26.90 PREGNANCY, ANTEPARTUM, COMPLICATIONS: ICD-10-CM

## 2019-05-31 ENCOUNTER — AMBULATORY - HEALTHEAST (OUTPATIENT)
Dept: MATERNAL FETAL MEDICINE | Facility: HOSPITAL | Age: 29
End: 2019-05-31

## 2019-06-03 ENCOUNTER — OFFICE VISIT - HEALTHEAST (OUTPATIENT)
Dept: MATERNAL FETAL MEDICINE | Facility: HOSPITAL | Age: 29
End: 2019-06-03

## 2019-06-03 ENCOUNTER — RECORDS - HEALTHEAST (OUTPATIENT)
Dept: ADMINISTRATIVE | Facility: OTHER | Age: 29
End: 2019-06-03

## 2019-06-03 ENCOUNTER — ALLIED HEALTH/NURSE VISIT (OUTPATIENT)
Dept: EDUCATION SERVICES | Facility: CLINIC | Age: 29
End: 2019-06-03
Payer: COMMERCIAL

## 2019-06-03 ENCOUNTER — RECORDS - HEALTHEAST (OUTPATIENT)
Dept: ULTRASOUND IMAGING | Facility: HOSPITAL | Age: 29
End: 2019-06-03

## 2019-06-03 DIAGNOSIS — O26.90 PREGNANCY RELATED CONDITIONS, UNSPECIFIED, UNSPECIFIED TRIMESTER: ICD-10-CM

## 2019-06-03 DIAGNOSIS — O24.410 DIET CONTROLLED GESTATIONAL DIABETES MELLITUS (GDM) IN THIRD TRIMESTER: ICD-10-CM

## 2019-06-03 DIAGNOSIS — O24.419 GESTATIONAL DIABETES: Primary | ICD-10-CM

## 2019-06-03 PROCEDURE — G0109 DIAB MANAGE TRN IND/GROUP: HCPCS

## 2019-06-03 NOTE — PROGRESS NOTES
Diabetes Self-Management Education & Support    Gestational Diabetes Self-Management Education & Support    SUBJECTIVE/OBJECTIVE:  Presents for education related to gestational diabetes.    Accompanied by: Self    Cultural Influences/Ethnic Background:  American    Estimated Date of Delivery: Aug 2, 2019    1 hour OGTT  Lab Results   Component Value Date    GLU1 155 (H) 05/10/2019       3 hour OGTT    Fasting  Lab Results   Component Value Date    GLF 79 05/15/2019       1 hour  Lab Results   Component Value Date    GL1 148 05/15/2019       2 hour  Lab Results   Component Value Date    GL2 158 (H) 05/15/2019       3 hour  Lab Results   Component Value Date    GL3 152 (H) 05/15/2019       Lifestyle and Health Behaviors:  Pre-pregnancy weight (lbs): 137  How intense was your typical exercise? : Light (like stretching or slow walking)  Barrier to exercise: Time, Other  Meals include: Breakfast, Lunch, Dinner, Snacks  Beverages: Water, Coffee  Cultural/Congregational diet restrictions?: No  Biggest challenges to healthy eating: Portion control, Eating out, Emotional eating, Evening snacking  Pre- vitamin?: Yes  Supplements?: No  Experiencing nausea?: No    Breakfast- PB toast with banana, protein shake  Snack - apple  Lunch - chicken patties on SW thins  Snack - almonds, yogurt  Dinner - burgers    Healthy Coping:  Informal Support system:: Family, Spouse    Current Management:       ASSESSMENT:  Needs assistance with meal planning and BG testing    INTERVENTION:  Patient was instructed on Accu-Chek Antonella meter and was able to provide an accurate return demonstration. Patient's blood glucose reading today was 99 mg/dL.    Educational topics covered today:  GDM diagnosis, pathophysiology, Risks and Complications of GDM, Means of controlling GDM, Using a Blood Glucose Monitor, Blood Glucose Goals, Logging and Interpreting Glucose Results, Ketone Testing, When to Call a Diabetes Educator or OB Provider, Healthy Eating  During Pregnancy, Counting Carbohydrates, Meal Planning for GDM, and Physical Activity    Educational materials provided today:   Ed[facility] Understanding Gestational Diabetes  GDM Log Book  Sharps Disposal   Care After Delivery    Pt verbalized understanding of concepts discussed and recommendations provided today.     PLAN:  Check glucose 4 times daily, before breakfast and 1 hour after each meal.     Check Ketones daily for one week, if negative, reduce testing to once a week.     Physical activity recommended: as able.    Meal plan: 2-3 carbs at breakfast, 3-4 carbs at lunch, 3-4 carbs at supper, 1-2 carbs at 3 snacks a day.  Follow consistent CHO meal plan, eat CHO and protein/fat at all meals/snacks.    Call/e-mail/[employer] message diabetes educator if 3 or more blood sugars are above the goal in 1 week, if ketones are positive, or with questions/concerns.    TRINO Small CDE  Time Spent: 90 minutes  Encounter Type: Group class    Any diabetes medication dose changes were made via the CDE Protocol and Collaborative Practice Agreement with the patient's OB/GYN provider. A copy of this encounter was shared with the provider.

## 2019-06-06 ENCOUNTER — PRENATAL OFFICE VISIT (OUTPATIENT)
Dept: OBGYN | Facility: CLINIC | Age: 29
End: 2019-06-06
Payer: COMMERCIAL

## 2019-06-06 VITALS — WEIGHT: 172.1 LBS | SYSTOLIC BLOOD PRESSURE: 112 MMHG | BODY MASS INDEX: 28.64 KG/M2 | DIASTOLIC BLOOD PRESSURE: 74 MMHG

## 2019-06-06 DIAGNOSIS — Z34.03 ENCOUNTER FOR SUPERVISION OF NORMAL FIRST PREGNANCY IN THIRD TRIMESTER: Primary | ICD-10-CM

## 2019-06-06 PROCEDURE — 99207 ZZC PRENATAL VISIT: CPT | Performed by: ADVANCED PRACTICE MIDWIFE

## 2019-06-06 NOTE — PROGRESS NOTES
S: Feels well,  Baby active.  Denies uterine cramping, vaginal bleeding or leaking of fluid. Patient has been checking BS. All values good except one after a party. Doing well adjusting diet. Went to diabetic education. MFM WNL, follow up scheduled.   O: Vitals: /74 (BP Location: Right arm, Patient Position: Chair, Cuff Size: Adult Regular)   Wt 78.1 kg (172 lb 1.6 oz)   LMP 10/26/2018 (Exact Date)   Breastfeeding? No   BMI 28.64 kg/m    BMI= Body mass index is 28.64 kg/m .  Exam:  Constitutional: healthy, alert and no distress  Respiratory: respirations even and unlabored  Gastrointestinal: Abdomen soft, non-tender. Fundus measures appropriate for gestational age. Fetal heart tones hear without difficulty and within normal limits  : Deferred  Psychiatric: mentation appears normal and affect normal/bright  A:     ICD-10-CM    1. Encounter for supervision of normal first pregnancy in third trimester Z34.03      P: Discussed plans for labor.   Encouraged patient to call with any questions or concerns.  Return to clinic 2 weeks    Janine Alcazar CNM

## 2019-06-06 NOTE — NURSING NOTE
"Chief Complaint   Patient presents with     Prenatal Care     31 weeks 6 days       Initial /74 (BP Location: Right arm, Patient Position: Chair, Cuff Size: Adult Regular)   Wt 78.1 kg (172 lb 1.6 oz)   LMP 10/26/2018 (Exact Date)   Breastfeeding? No   BMI 28.64 kg/m   Estimated body mass index is 28.64 kg/m  as calculated from the following:    Height as of 19: 1.651 m (5' 5\").    Weight as of this encounter: 78.1 kg (172 lb 1.6 oz).  BP completed using cuff size: regular    Questioned patient about current smoking habits.  Pt. has never smoked.    31w6d      The following HM Due: NONE      +FM daily   +Headaches more often        Jo-Ann Jamison, CMA on 2019 at 3:15 PM       "

## 2019-06-07 ENCOUNTER — MYC MEDICAL ADVICE (OUTPATIENT)
Dept: OBGYN | Facility: CLINIC | Age: 29
End: 2019-06-07

## 2019-06-07 ENCOUNTER — PRENATAL OFFICE VISIT (OUTPATIENT)
Dept: OBGYN | Facility: CLINIC | Age: 29
End: 2019-06-07
Payer: COMMERCIAL

## 2019-06-07 VITALS — DIASTOLIC BLOOD PRESSURE: 68 MMHG | SYSTOLIC BLOOD PRESSURE: 110 MMHG | BODY MASS INDEX: 28.12 KG/M2 | WEIGHT: 169 LBS

## 2019-06-07 DIAGNOSIS — Z34.03 ENCOUNTER FOR SUPERVISION OF NORMAL FIRST PREGNANCY IN THIRD TRIMESTER: Primary | ICD-10-CM

## 2019-06-07 PROCEDURE — 99207 ZZC PRENATAL VISIT: CPT | Performed by: ADVANCED PRACTICE MIDWIFE

## 2019-06-07 PROCEDURE — 59025 FETAL NON-STRESS TEST: CPT | Performed by: ADVANCED PRACTICE MIDWIFE

## 2019-06-07 NOTE — TELEPHONE ENCOUNTER
Pt calling to make sure we got this message. 32w0d. She said the incident occurred around 6:30 last night. She does not have any bleeding. A few mild cramps this morning. Still having good fetal movement. Please advise if you would like pt to have monitoring.      Yajaira Dudley RN

## 2019-06-07 NOTE — TELEPHONE ENCOUNTER
See [employer] message, any monitoring needed?    And do you want to give out your pager number?    Amber CIFUENTES R.N.  [facility]

## 2019-06-07 NOTE — PROGRESS NOTES
S: Feels well. Bumped abdomen on shopping cart yesterday. No bruising noted.  Baby active.  Denies uterine cramping, vaginal bleeding or leaking of fluid  O: Vitals: /68 (BP Location: Left arm, Cuff Size: Adult Regular)   Wt 76.7 kg (169 lb)   LMP 10/26/2018 (Exact Date)   BMI 28.12 kg/m    BMI= Body mass index is 28.12 kg/m .  Exam:  Constitutional: healthy, alert and no distress  Respiratory: respirations even and unlabored  Gastrointestinal: Abdomen soft, non-tender. Fundus measures appropriate for gestational age. Fetal heart tones hear without difficulty and within normal limits  : Deferred  Psychiatric: mentation appears normal and affect normal/bright    Fetal Non-Stress Test     NST Start time: 10:15  NST Stop time:    Fetal heart tones:   Baseline 140  Variability: moderate  Accelerations: Present  Decelerations:  None  Category 1         fetal heart rate tracing    NST Interpretation: reactive           A:     ICD-10-CM    1. Encounter for supervision of normal first pregnancy in third trimester Z34.03 FETAL NON-STRESS TEST     P: Reactive NST. Patient reassured.  Discussed plans for labor. Encouraged patient to call with any questions or concerns.  Return to clinic 2 weeks    DONOVAN Mccormick, NIKKIE

## 2019-06-07 NOTE — PATIENT INSTRUCTIONS
Weeks 33 thru 36 - Gestational Age (Fetal Age - Weeks 31 thru 34):  This is about the time that the fetus will descend into the head down position preparing for birth. The fetus is beginning to gain weight more rapidly. The lanugo hair will disappear from the skin, and it is becoming less red and wrinkled. The fetus is now 16-19 inches and weighs anywhere from 5   lbs to 6   lbs.

## 2019-06-07 NOTE — NURSING NOTE
"Chief Complaint   Patient presents with     Prenatal Care     32w, had shopping cart bar hit stomach yesterday. Denies bleeding, +FM       Initial /68 (BP Location: Left arm, Cuff Size: Adult Regular)   Wt 76.7 kg (169 lb)   LMP 10/26/2018 (Exact Date)   BMI 28.12 kg/m   Estimated body mass index is 28.12 kg/m  as calculated from the following:    Height as of 19: 1.651 m (5' 5\").    Weight as of this encounter: 76.7 kg (169 lb).  BP completed using cuff size: regular    Questioned patient about current smoking habits.  Pt. has never smoked.          The following HM Due: NONE  Idalmis Prieto CMA             "

## 2019-06-10 ENCOUNTER — MYC REFILL (OUTPATIENT)
Dept: OBGYN | Facility: CLINIC | Age: 29
End: 2019-06-10

## 2019-06-10 DIAGNOSIS — O24.410 DIET CONTROLLED GESTATIONAL DIABETES MELLITUS (GDM) IN THIRD TRIMESTER: ICD-10-CM

## 2019-06-10 NOTE — TELEPHONE ENCOUNTER
"Requested Prescriptions   Signed Prescriptions Disp Refills    blood glucose (NO BRAND SPECIFIED) lancets standard 100 each 1     Sig: Use to test blood sugar 4 times daily or as directed.       There is no refill protocol information for this order       blood glucose (NO BRAND SPECIFIED) test strip 100 strip 1     Sig: Use to test blood sugar 4 times daily or as directed.       Diabetic Supplies Protocol Passed - 6/10/2019  8:41 AM        Passed - Medication is active on med list        Passed - Patient is 18 years of age or older        Passed - Recent (6 mo) or future (30 days) visit within the authorizing provider's specialty     Patient had office visit in the last 6 months or has a visit in the next 30 days with authorizing provider.  See \"Patient Info\" tab in inbasket, or \"Choose Columns\" in Meds & Orders section of the refill encounter.            Sent.    Amber CIFUENTES R.N.  [facility]    "

## 2019-06-12 ENCOUNTER — OFFICE VISIT (OUTPATIENT)
Dept: OBGYN | Facility: CLINIC | Age: 29
End: 2019-06-12
Payer: COMMERCIAL

## 2019-06-12 ENCOUNTER — HOSPITAL ENCOUNTER (INPATIENT)
Facility: CLINIC | Age: 29
LOS: 4 days | Discharge: HOME | End: 2019-06-16
Attending: ADVANCED PRACTICE MIDWIFE | Admitting: OBSTETRICS & GYNECOLOGY
Payer: COMMERCIAL

## 2019-06-12 ENCOUNTER — APPOINTMENT (OUTPATIENT)
Dept: ULTRASOUND IMAGING | Facility: CLINIC | Age: 29
End: 2019-06-12
Attending: ADVANCED PRACTICE MIDWIFE
Payer: COMMERCIAL

## 2019-06-12 ENCOUNTER — ALLIED HEALTH/NURSE VISIT (OUTPATIENT)
Dept: EDUCATION SERVICES | Facility: CLINIC | Age: 29
End: 2019-06-12
Payer: COMMERCIAL

## 2019-06-12 ENCOUNTER — PATIENT OUTREACH (OUTPATIENT)
Dept: EDUCATION SERVICES | Facility: CLINIC | Age: 29
End: 2019-06-12

## 2019-06-12 VITALS
HEART RATE: 72 BPM | DIASTOLIC BLOOD PRESSURE: 74 MMHG | BODY MASS INDEX: 28.32 KG/M2 | WEIGHT: 170 LBS | HEIGHT: 65 IN | SYSTOLIC BLOOD PRESSURE: 112 MMHG

## 2019-06-12 DIAGNOSIS — O47.00 PRETERM CONTRACTIONS: ICD-10-CM

## 2019-06-12 DIAGNOSIS — Z98.891 S/P CESAREAN SECTION: Primary | ICD-10-CM

## 2019-06-12 DIAGNOSIS — O36.8390 FETAL HEART RATE DECELERATIONS AFFECTING MANAGEMENT OF MOTHER: ICD-10-CM

## 2019-06-12 DIAGNOSIS — O24.419 GESTATIONAL DIABETES: Primary | ICD-10-CM

## 2019-06-12 DIAGNOSIS — N89.8 VAGINAL DISCHARGE: Primary | ICD-10-CM

## 2019-06-12 PROBLEM — Z36.89 ENCOUNTER FOR TRIAGE IN PREGNANT PATIENT: Status: ACTIVE | Noted: 2019-06-12

## 2019-06-12 LAB
ABO + RH BLD: ABNORMAL
ABO + RH BLD: ABNORMAL
ALBUMIN UR-MCNC: NEGATIVE MG/DL
APPEARANCE UR: CLEAR
BASOPHILS # BLD AUTO: 0 10E9/L (ref 0–0.2)
BASOPHILS NFR BLD AUTO: 0.2 %
BILIRUB UR QL STRIP: NEGATIVE
BLD GP AB INVEST PLASRBC-IMP: ABNORMAL
BLD GP AB SCN SERPL QL: ABNORMAL
BLOOD BANK CMNT PATIENT-IMP: ABNORMAL
COLOR UR AUTO: NORMAL
DIFFERENTIAL METHOD BLD: NORMAL
EOSINOPHIL # BLD AUTO: 0.1 10E9/L (ref 0–0.7)
EOSINOPHIL NFR BLD AUTO: 0.5 %
ERYTHROCYTE [DISTWIDTH] IN BLOOD BY AUTOMATED COUNT: 13.4 % (ref 10–15)
GLUCOSE BLDC GLUCOMTR-MCNC: 138 MG/DL (ref 70–99)
GLUCOSE BLDC GLUCOMTR-MCNC: 88 MG/DL (ref 70–99)
GLUCOSE UR STRIP-MCNC: NEGATIVE MG/DL
HCT VFR BLD AUTO: 42.4 % (ref 35–47)
HGB BLD-MCNC: 14.5 G/DL (ref 11.7–15.7)
HGB UR QL STRIP: NEGATIVE
IMM GRANULOCYTES # BLD: 0.1 10E9/L (ref 0–0.4)
IMM GRANULOCYTES NFR BLD: 0.8 %
KETONES UR STRIP-MCNC: NEGATIVE MG/DL
LEUKOCYTE ESTERASE UR QL STRIP: NEGATIVE
LYMPHOCYTES # BLD AUTO: 2 10E9/L (ref 0.8–5.3)
LYMPHOCYTES NFR BLD AUTO: 17.9 %
MCH RBC QN AUTO: 30.1 PG (ref 26.5–33)
MCHC RBC AUTO-ENTMCNC: 34.2 G/DL (ref 31.5–36.5)
MCV RBC AUTO: 88 FL (ref 78–100)
MONOCYTES # BLD AUTO: 0.9 10E9/L (ref 0–1.3)
MONOCYTES NFR BLD AUTO: 8.3 %
NEUTROPHILS # BLD AUTO: 7.9 10E9/L (ref 1.6–8.3)
NEUTROPHILS NFR BLD AUTO: 72.3 %
NITRATE UR QL: NEGATIVE
NRBC # BLD AUTO: 0 10*3/UL
NRBC BLD AUTO-RTO: 0 /100
PH UR STRIP: 6.5 PH (ref 5–7)
PLATELET # BLD AUTO: 241 10E9/L (ref 150–450)
RBC # BLD AUTO: 4.82 10E12/L (ref 3.8–5.2)
RBC #/AREA URNS AUTO: 0 /HPF (ref 0–2)
RUPTURE OF FETAL MEMBRANES BY ROM PLUS: NEGATIVE
SOURCE: NORMAL
SP GR UR STRIP: 1 (ref 1–1.03)
SPECIMEN EXP DATE BLD: ABNORMAL
SPECIMEN SOURCE: NORMAL
UROBILINOGEN UR STRIP-MCNC: NORMAL MG/DL (ref 0–2)
WBC # BLD AUTO: 11 10E9/L (ref 4–11)
WBC #/AREA URNS AUTO: 0 /HPF (ref 0–5)
WET PREP SPEC: NORMAL

## 2019-06-12 PROCEDURE — 12000000 ZZH R&B MED SURG/OB

## 2019-06-12 PROCEDURE — 87491 CHLMYD TRACH DNA AMP PROBE: CPT | Performed by: OBSTETRICS & GYNECOLOGY

## 2019-06-12 PROCEDURE — 87653 STREP B DNA AMP PROBE: CPT | Performed by: ADVANCED PRACTICE MIDWIFE

## 2019-06-12 PROCEDURE — 96361 HYDRATE IV INFUSION ADD-ON: CPT

## 2019-06-12 PROCEDURE — 25000132 ZZH RX MED GY IP 250 OP 250 PS 637: Performed by: OBSTETRICS & GYNECOLOGY

## 2019-06-12 PROCEDURE — 99214 OFFICE O/P EST MOD 30 MIN: CPT | Mod: 25 | Performed by: ADVANCED PRACTICE MIDWIFE

## 2019-06-12 PROCEDURE — 86870 RBC ANTIBODY IDENTIFICATION: CPT | Performed by: OBSTETRICS & GYNECOLOGY

## 2019-06-12 PROCEDURE — 86900 BLOOD TYPING SEROLOGIC ABO: CPT | Performed by: OBSTETRICS & GYNECOLOGY

## 2019-06-12 PROCEDURE — 86901 BLOOD TYPING SEROLOGIC RH(D): CPT | Performed by: OBSTETRICS & GYNECOLOGY

## 2019-06-12 PROCEDURE — 87210 SMEAR WET MOUNT SALINE/INK: CPT | Performed by: ADVANCED PRACTICE MIDWIFE

## 2019-06-12 PROCEDURE — 76819 FETAL BIOPHYS PROFIL W/O NST: CPT

## 2019-06-12 PROCEDURE — G0108 DIAB MANAGE TRN  PER INDIV: HCPCS

## 2019-06-12 PROCEDURE — G0463 HOSPITAL OUTPT CLINIC VISIT: HCPCS | Mod: 25

## 2019-06-12 PROCEDURE — 25800030 ZZH RX IP 258 OP 636: Performed by: ADVANCED PRACTICE MIDWIFE

## 2019-06-12 PROCEDURE — 87591 N.GONORRHOEAE DNA AMP PROB: CPT | Performed by: OBSTETRICS & GYNECOLOGY

## 2019-06-12 PROCEDURE — 96360 HYDRATION IV INFUSION INIT: CPT

## 2019-06-12 PROCEDURE — 85025 COMPLETE CBC W/AUTO DIFF WBC: CPT | Performed by: OBSTETRICS & GYNECOLOGY

## 2019-06-12 PROCEDURE — 84112 EVAL AMNIOTIC FLUID PROTEIN: CPT | Performed by: ADVANCED PRACTICE MIDWIFE

## 2019-06-12 PROCEDURE — 86850 RBC ANTIBODY SCREEN: CPT | Performed by: OBSTETRICS & GYNECOLOGY

## 2019-06-12 PROCEDURE — 59025 FETAL NON-STRESS TEST: CPT | Performed by: ADVANCED PRACTICE MIDWIFE

## 2019-06-12 PROCEDURE — 36415 COLL VENOUS BLD VENIPUNCTURE: CPT | Performed by: OBSTETRICS & GYNECOLOGY

## 2019-06-12 PROCEDURE — 81001 URINALYSIS AUTO W/SCOPE: CPT | Performed by: ADVANCED PRACTICE MIDWIFE

## 2019-06-12 PROCEDURE — 25000128 H RX IP 250 OP 636: Performed by: OBSTETRICS & GYNECOLOGY

## 2019-06-12 PROCEDURE — 00000146 ZZHCL STATISTIC GLUCOSE BY METER IP

## 2019-06-12 RX ORDER — LIDOCAINE 40 MG/G
CREAM TOPICAL
Status: DISCONTINUED | OUTPATIENT
Start: 2019-06-12 | End: 2019-06-13

## 2019-06-12 RX ORDER — NIFEDIPINE 10 MG/1
20 CAPSULE ORAL ONCE
Status: COMPLETED | OUTPATIENT
Start: 2019-06-12 | End: 2019-06-12

## 2019-06-12 RX ORDER — NICOTINE POLACRILEX 4 MG
15-30 LOZENGE BUCCAL
Status: DISCONTINUED | OUTPATIENT
Start: 2019-06-12 | End: 2019-06-13

## 2019-06-12 RX ORDER — PENICILLIN G POTASSIUM 5000000 [IU]/1
5 INJECTION, POWDER, FOR SOLUTION INTRAMUSCULAR; INTRAVENOUS ONCE
Status: COMPLETED | OUTPATIENT
Start: 2019-06-12 | End: 2019-06-12

## 2019-06-12 RX ORDER — SODIUM CHLORIDE, SODIUM LACTATE, POTASSIUM CHLORIDE, CALCIUM CHLORIDE 600; 310; 30; 20 MG/100ML; MG/100ML; MG/100ML; MG/100ML
INJECTION, SOLUTION INTRAVENOUS CONTINUOUS
Status: DISCONTINUED | OUTPATIENT
Start: 2019-06-12 | End: 2019-06-16 | Stop reason: HOSPADM

## 2019-06-12 RX ORDER — DEXTROSE MONOHYDRATE 25 G/50ML
25-50 INJECTION, SOLUTION INTRAVENOUS
Status: DISCONTINUED | OUTPATIENT
Start: 2019-06-12 | End: 2019-06-13

## 2019-06-12 RX ORDER — BETAMETHASONE SODIUM PHOSPHATE AND BETAMETHASONE ACETATE 3; 3 MG/ML; MG/ML
12 INJECTION, SUSPENSION INTRA-ARTICULAR; INTRALESIONAL; INTRAMUSCULAR; SOFT TISSUE EVERY 24 HOURS
Status: DISCONTINUED | OUTPATIENT
Start: 2019-06-12 | End: 2019-06-13

## 2019-06-12 RX ORDER — NIFEDIPINE 10 MG/1
10 CAPSULE ORAL EVERY 6 HOURS SCHEDULED
Status: DISCONTINUED | OUTPATIENT
Start: 2019-06-13 | End: 2019-06-13

## 2019-06-12 RX ADMIN — PENICILLIN G POTASSIUM 5 MILLION UNITS: 5000000 POWDER, FOR SOLUTION INTRAMUSCULAR; INTRAPLEURAL; INTRATHECAL; INTRAVENOUS at 21:15

## 2019-06-12 RX ADMIN — SODIUM CHLORIDE, POTASSIUM CHLORIDE, SODIUM LACTATE AND CALCIUM CHLORIDE: 600; 310; 30; 20 INJECTION, SOLUTION INTRAVENOUS at 18:26

## 2019-06-12 RX ADMIN — SODIUM CHLORIDE, POTASSIUM CHLORIDE, SODIUM LACTATE AND CALCIUM CHLORIDE 1000 ML: 600; 310; 30; 20 INJECTION, SOLUTION INTRAVENOUS at 17:28

## 2019-06-12 RX ADMIN — NIFEDIPINE 20 MG: 10 CAPSULE ORAL at 19:08

## 2019-06-12 RX ADMIN — BETAMETHASONE SODIUM PHOSPHATE AND BETAMETHASONE ACETATE 12 MG: 3; 3 INJECTION, SUSPENSION INTRA-ARTICULAR; INTRALESIONAL; INTRAMUSCULAR at 19:11

## 2019-06-12 ASSESSMENT — MIFFLIN-ST. JEOR: SCORE: 1501.99

## 2019-06-12 NOTE — PROGRESS NOTES
Diabetes Self-Management Education & Support  Gestational Diabetes Self-Management Education & Support    SUBJECTIVE/OBJECTIVE:  Presents for education related to gestational diabetes.    Accompanied by: Self  Diabetes management related comments/concerns: none    Cultural Influences/Ethnic Background:  American      LMP 10/26/2018 (Exact Date)       Estimated Date of Delivery: Aug 2, 2019    Blood Glucose/Ketone Log:       Lifestyle and Health Behaviors:  Exercise:: Yes  Days per week of moderate to strenuous exercise (like a brisk walk): 7  On average, minutes per day of exercise at this level: 20  How intense was your typical exercise? : Moderate (like brisk walking)  Exercise Minutes per Week: 140  Barrier to exercise: None  Meals include: Breakfast, Lunch, Dinner, Snacks  Biggest challenges to healthy eating: None    Healthy Coping:  Emotional response to diabetes: Ready to learn  Informal Support system:: Spouse  Stage of change: ACTION (Actively working towards change)    Current Management:  Taking medications for gestational diabetes?: No  Difficulty affording diabetes management supplies?: No    ASSESSMENT:  Ketones: neg.   Fasting blood glucoses: 100% in target.  After breakfast: 83% in target.  After lunch: 85% in target.  After dinner: 100% in target.    Doing well with GDM meal plan.  Two occurrences of post-meal elevations caused by higher-carb meals.  Not always having protein with bedtime snack (i.e. Banana), but FBGs all within target.  Can reduce ketone testing to once weekly.  Patient to call/email with BG log every 2 weeks unless having 3+ BG elevations per week.    INTERVENTION:  Educational topics covered today:  What to expect after delivery, Future testing for Type 2 diabetes (2 hour OGTT at 6 week post-partum check-up and annual fasting blood glucose level), Risk of GDM and planning ahead for future pregnancies, Recommended lifestyle interventions for reducing the risk of Type 2 Diabetes,  When to Call a Diabetes Educator or OB Provider    Educational Materials provided today:  [facility] Preventing Diabetes    PLAN:  Check glucose 4 times daily.  Check ketones once a week.  Continue with recommended physical activity.  Continue to follow recommended meal plan: 2-3 carbs at breakfast, 3-4 carbs at lunch, 3-4 carbs at supper, 1-2 carbs at snacks.  Follow consistent CHO meal plan, eat CHO and protein/fat at all meals/snacks.    Call/e-mail/MyChart message diabetes educator if 3 or more blood sugars are above the goal in 1 week or if ketones are positive.    TRINO Beanvides CDE    Time Spent: 30 minutes  Encounter Type: Individual    Any diabetes medication dose changes were made via the CDE Protocol and Collaborative Practice Agreement with the patient's OB/GYN provider. A copy of this encounter was shared with the provider.

## 2019-06-12 NOTE — PROVIDER NOTIFICATION
06/12/19 1728   Provider Notification   Provider Name/Title Janine PRATHER CNM   Method of Notification At Bedside   Janine PRATHER CNM at bedside. Updated CNM on 4 min PD with kishor to 70's. Improved with repositioning, O2, and IV fluid bolus infusing.     CNM orders--states she will check SVE, collect ROM +, collect GBS, IVF bolus of LR with maintenance to follow at 125 ml/hr, bedside BPP, one touch BG, and continuous fetal and uterine monitoring for now.     SVE by CNM 2/70/-2, mid position and soft.

## 2019-06-12 NOTE — PLAN OF CARE
"Data: [facility]: 2019  5:01 PM.  Reason for maternal/fetal assessment is fetal and uterine monitoring (NST). Patient reports \"my baby had a low HR at the clinic and I had a few contractions so they told me I should come to the hospital to be monitored\".  Patient is a .  Prenatal record reviewed. Pregnancy  has been complicated by gestational diabetes, diet controlled.  Gestational Age 32w5d. VSS. Fetal movement present. Patient denies vaginal bleeding, abdominal pain, pelvic pressure, nausea, vomiting, headache, visual disturbances, epigastric or URQ pain, significant edema. Support person is not present. Pt's  recently left for a business trip to [place].  Action: Verbal consent for EFM. Triage assessment completed. Bill of rights reviewed.  Response: Patient verbalized agreement with plan. Will contact Dr Janine Alcazar with update and further orders.  "

## 2019-06-12 NOTE — PATIENT INSTRUCTIONS
1. Check glucose 4 times daily, before breakfast daily and 1 hour after each meal, as recommended.    2. Check ketones daily or once a week after they have been negative for 7 days in a row. If ketones are positive, let your diabetes educator know and continue to check daily until they are negative for 7 days in a row.    3. Continue with recommended physical activity.    4. Continue to follow recommended meal plan: 2-3 carbs at breakfast, 3-4 carbs at lunch, 3-4 carbs at supper, 1-2 carbs at snacks.  Follow consistent CHO meal plan, eat CHO and protein/fat at all meals/snacks.    5. Follow-up with OB doctor as recommended.    6. Call/e-mail diabetes educator if 3 or more blood sugars are above the goal in 1 week or if ketones are positive.       AFTER YOU DELIVER:  - Continue with healthy eating and physical activity to get back to your pre-pregnancy weight.   - Have a follow-up 2-hour Glucose Tolerance Test at your 6-week post-partum check-up.   - Have your fasting blood sugar checked once a year.  - Plan ahead for future pregnancies - eat healthy, keep active, work with your doctor to check for gestational diabetes early on in the pregnancy and check blood sugars as recommended by your doctor.

## 2019-06-12 NOTE — PROGRESS NOTES
[facility] CNM TRIAGE NOTE    Seth Null is a 28 year old  with and IUP at 32w5d who presents from the clinic after having a decel, luis f, and reporting vaginal discharge. She had a negative wet prep in clinic. Upon arrival to unit, patient had a prolonged decel followed by a variable.     Patient states baby is active.  Denies ROM   Denies vaginal bleeding  Present OB History at [facility] with the CNMs.     Problems this pregnancy:   1. GDM, diet controlled.     ROS:  Patient is alert and oriented    PHYSICAL EXAM:  /80   Temp 98.4  F (36.9  C) (Oral)   Resp 18   LMP 10/26/2018 (Exact Date)     FHT's 140's with moderate variability  Accelerations: present   Decelerations:  Present, one 4 minute prolonged decel to 70's followed by a variable decel, good recovery.         Contractions: Pt is luis f every 10 minutes, lasting 60 seconds and palpates mild     Abdomen: gravid  Bloody show: no  Cervix: 2/ 70% / Mid/ soft/ -2  Membranes: ROMPlus pending.       ASSESSMENT :   28 year old  with garcia IUP 32w5d for observation.    NST  non-reactive  GBS unknown.    PLAN:  IV in place, fluid bolus running.   FFN not indicated as she had an exam in the clinic today.  ROMPlus and GBS collected.   BPP ordered.   Standard blood sugar checks.   UA when able to urinate.   Discussed with patient that she will need continuous monitoring for an extended period now.   Will notify Dr. Danielle of patient status.     DONOVAN Khoury CNM

## 2019-06-12 NOTE — PROGRESS NOTES
Upon reviewing patient case with Dr. Danielle, we both agree that patient will be transferred to physician care at this time. Patient aware. Dr. Danielle to assume all cares at this time. Janine Alcazar CNM

## 2019-06-12 NOTE — PROVIDER NOTIFICATION
06/12/19 1845   Provider Notification   Provider Name/Title Dr. Danielle   Method of Notification In Department   Updated Dr. Danielle in department on contractions q1-5 minutes with uterine irritability. Pt states she does not feel contractions. FHT's now category I after PD. Maternal temp now 99.4 oral. BPP 8/8, fetus cephalic presentation. ROM plus negative. GBS swab pending. UA pending. MD discussed case with MFM on phone.    MD orders to admit pt for observation overnight. Orders for labs, scheduled PO Procardia, IV PCN, Gonorrhea/Chlamydia swab, continuous fetal and uterine monitoring, NPO for at least 4 hours then to re-evaluate. Orders received and initiated. POC reviewed with pt--all questions answered.

## 2019-06-12 NOTE — RESULT ENCOUNTER NOTE
Results discussed directly with patient while patient was present. Any further details documented in the note.   DONOVAN Rodriguez CNM

## 2019-06-12 NOTE — PROGRESS NOTES
"SUBJECTIVE:   Seth is a 28 year old here for vaginal symptoms:  Irritation and yellow discharge  Patient also reported increasing \"menstural like cramps the past few days.\" Periods of about 30 minutes to 1 hour a few times a day, feels like no break. Does not rate on scale of 1-10       Vaginal Symptoms     Onset: a couple days     Description:  Vaginal Discharge: yellow  Itching (Pruritis): No  Burning sensation:  No  Odor:  No  Irritation:  Yes    Accompanying Signs & Symptoms:  Pain with Urination: No  Abdominal Pain:  No  Fever: No   History:   Sexually active:  Yes  New Partner:  No  Possibility of Pregnancy:  33 weeks pregnant      Precipitating factors:   Recent Antibiotic Use: No    Alleviating factors:     Therapies Tried and outcome:   Previous Episodes of Vaginitis:  No      Other associated symptoms: none.  History of STI's:  No  STI Testing offered: NO    LMP: Patient's last menstrual period was 10/26/2018 (exact date).      Patient Active Problem List   Diagnosis     Female infertility     Ganglion cyst of wrist, right     Past Medical History:   Diagnosis Date     NO ACTIVE PROBLEMS (aka NONE)      Past Surgical History:   Procedure Laterality Date     MAMMOPLASTY REDUCTION       Current Outpatient Medications   Medication Sig Dispense Refill     acetone urine (KETOSTIX) test strip Test once daily 25 each 1     blood glucose (NO BRAND SPECIFIED) lancets standard Use to test blood sugar 4 times daily or as directed. 100 each 1     blood glucose (NO BRAND SPECIFIED) test strip Use to test blood sugar 4 times daily or as directed. 100 strip 1     blood glucose monitoring (NO BRAND SPECIFIED) meter device kit Use to test blood sugar 4 times daily or as directed. 1 kit 0     Prenatal Vit-Fe Fumarate-FA (PRENATAL MULTIVITAMIN W/IRON) 27-0.8 MG tablet Take 1 tablet by mouth daily       No Known Allergies    Health maintenance updated:  yes    ROS:   12 point review of systems negative other than symptoms " "noted below.  Genitourinary: Vaginal Discharge    PHYSICAL EXAM:    /74   Pulse 72   Ht 1.651 m (5' 5\")   Wt 77.1 kg (170 lb)   LMP 10/26/2018 (Exact Date)   BMI 28.29 kg/m  , Body mass index is 28.29 kg/m .  General appearance:  healthy, alert and no distress  Decelerations heard with doppler, return to baseline 140s after ~30 seconds, additional 30 seconds at baseline, see NST results   Pelvic Exam:  Vulva: No lesions, no adenopathy, BUS:  wnl.   Vagina: Moist, pink, discharge moderate  well rugated, no lesions  Cervix:soft, smooth, pink, no visible lesions. Cervix appears to be slightly open  Rectal exam: deferred    SVE per digital exam; fetal head very low, difficulty reaching os behind head, soft/posterior/-1    ASSESSMENT/PLAN:     ICD-10-CM    1. Vaginal discharge N89.8 Wet prep   2.  contractions O47.9    3. Fetal heart rate decelerations affecting management of mother O36.8390 FETAL NON-STRESS TEST       Results for orders placed or performed in visit on 19   Wet prep   Result Value Ref Range    Specimen Description Vagina     Wet Prep No yeast seen     Wet Prep No Trichomonas seen     Wet Prep No clue cells seen     Wet Prep Moderate  WBC'S seen          COUNSELING:  Patient initially did not report menstrual cramping prior to exam and speculum was already inserted therefore FFN was not done  Digital exam performed due to appearance of opening cervix, difficulty reaching os  Discussed exam findings with patient and recommend further evaluation with NST now  Contacted on call CNM at [facility] and discussed case, I recommended further monitoring of patient due to 3 contractions in 20 minute NST and early gestational age  Wet prep ordered; wet prep negative    NST: 145, moderate, + Accels, no decels, reactive  Contractions x 3, 50-80, patient unaware    25  minutes was spent face to face with the patient today discussing her history, diagnosis, and follow-up plan as noted " above. Over 50% of the visit was spent in counseling and coordination of care.    Total Visit Time: 25 minutes.       DONOVAN Owusu, KEITHM

## 2019-06-12 NOTE — PROGRESS NOTES
Gestational Diabetes Follow-up    Subjective/Objective:    Seth Null sent in blood glucose log for review. Last date of communication was: 6/8/19.    Gestational diabetes is being managed with diet and activity    Taking diabetes medications: no    Estimated Date of Delivery: Aug 2, 2019    BG/Food Log:           Assessment:    Ketones: neg.   Fasting blood glucoses: 100% in target.  After breakfast: 83% in target.  After lunch: 85% in target.  After dinner: 100% in target.    Plan/Response:  No changes in the patient's current treatment plan.  Has f/u with CDE this morning scheduled.     CDE reply:    Fidencio Ann,    Thanks for sending in your numbers. My apologies as we were all at an all-day meeting yesterday so just seeing these now. I see you have an appointment this morning for follow up so I will let that educator go over your numbers more in detail but they look mostly in target so far.     Keep up the great work!     Wanda Mancini RD LD CDE      Any diabetes medication dose changes were made via the CDE Protocol and Collaborative Practice Agreement with the patient's OB/GYN provider. A copy of this encounter was shared with the provider.

## 2019-06-13 ENCOUNTER — ANESTHESIA EVENT (OUTPATIENT)
Dept: OBGYN | Facility: CLINIC | Age: 29
End: 2019-06-13
Payer: COMMERCIAL

## 2019-06-13 ENCOUNTER — ANESTHESIA (OUTPATIENT)
Dept: OBGYN | Facility: CLINIC | Age: 29
End: 2019-06-13
Payer: COMMERCIAL

## 2019-06-13 ENCOUNTER — SURGERY (OUTPATIENT)
Age: 29
End: 2019-06-13
Payer: COMMERCIAL

## 2019-06-13 PROBLEM — Z98.891 S/P CESAREAN SECTION: Status: ACTIVE | Noted: 2019-06-13

## 2019-06-13 LAB
APTT PPP: 29 SEC (ref 22–37)
BLOOD BANK CMNT PATIENT-IMP: NORMAL
BLOOD BANK CMNT PATIENT-IMP: NORMAL
C TRACH DNA SPEC QL NAA+PROBE: NORMAL
ERYTHROCYTE [DISTWIDTH] IN BLOOD BY AUTOMATED COUNT: 13.3 % (ref 10–15)
FIBRINOGEN PPP-MCNC: 491 MG/DL (ref 200–420)
GLUCOSE BLDC GLUCOMTR-MCNC: 125 MG/DL (ref 70–99)
GP B STREP DNA SPEC QL NAA+PROBE: NEGATIVE
HCT VFR BLD AUTO: 39.5 % (ref 35–47)
HGB BLD-MCNC: 13.6 G/DL (ref 11.7–15.7)
HGB F BLD QL KLEIH BETKE: NORMAL
INR PPP: 0.92 (ref 0.86–1.14)
MCH RBC QN AUTO: 30 PG (ref 26.5–33)
MCHC RBC AUTO-ENTMCNC: 34.4 G/DL (ref 31.5–36.5)
MCV RBC AUTO: 87 FL (ref 78–100)
N GONORRHOEA DNA SPEC QL NAA+PROBE: NORMAL
PLATELET # BLD AUTO: 227 10E9/L (ref 150–450)
RBC # BLD AUTO: 4.53 10E12/L (ref 3.8–5.2)
SPECIMEN SOURCE: NORMAL
WBC # BLD AUTO: 12.9 10E9/L (ref 4–11)

## 2019-06-13 PROCEDURE — 25800030 ZZH RX IP 258 OP 636: Performed by: NURSE ANESTHETIST, CERTIFIED REGISTERED

## 2019-06-13 PROCEDURE — 00000146 ZZHCL STATISTIC GLUCOSE BY METER IP

## 2019-06-13 PROCEDURE — 25000125 ZZHC RX 250: Performed by: OBSTETRICS & GYNECOLOGY

## 2019-06-13 PROCEDURE — 27210794 ZZH OR GENERAL SUPPLY STERILE: Performed by: OBSTETRICS & GYNECOLOGY

## 2019-06-13 PROCEDURE — 85610 PROTHROMBIN TIME: CPT | Performed by: OBSTETRICS & GYNECOLOGY

## 2019-06-13 PROCEDURE — 37000008 ZZH ANESTHESIA TECHNICAL FEE, 1ST 30 MIN: Performed by: OBSTETRICS & GYNECOLOGY

## 2019-06-13 PROCEDURE — 25000128 H RX IP 250 OP 636: Performed by: NURSE ANESTHETIST, CERTIFIED REGISTERED

## 2019-06-13 PROCEDURE — 85384 FIBRINOGEN ACTIVITY: CPT | Performed by: OBSTETRICS & GYNECOLOGY

## 2019-06-13 PROCEDURE — 71000015 ZZH RECOVERY PHASE 1 LEVEL 2 EA ADDTL HR: Performed by: OBSTETRICS & GYNECOLOGY

## 2019-06-13 PROCEDURE — 85730 THROMBOPLASTIN TIME PARTIAL: CPT | Performed by: OBSTETRICS & GYNECOLOGY

## 2019-06-13 PROCEDURE — 25800030 ZZH RX IP 258 OP 636: Performed by: ADVANCED PRACTICE MIDWIFE

## 2019-06-13 PROCEDURE — 85027 COMPLETE CBC AUTOMATED: CPT | Performed by: OBSTETRICS & GYNECOLOGY

## 2019-06-13 PROCEDURE — 71000014 ZZH RECOVERY PHASE 1 LEVEL 2 FIRST HR: Performed by: OBSTETRICS & GYNECOLOGY

## 2019-06-13 PROCEDURE — 25000128 H RX IP 250 OP 636: Performed by: OBSTETRICS & GYNECOLOGY

## 2019-06-13 PROCEDURE — 25000125 ZZHC RX 250: Performed by: NURSE ANESTHETIST, CERTIFIED REGISTERED

## 2019-06-13 PROCEDURE — 36415 COLL VENOUS BLD VENIPUNCTURE: CPT | Performed by: OBSTETRICS & GYNECOLOGY

## 2019-06-13 PROCEDURE — 25000132 ZZH RX MED GY IP 250 OP 250 PS 637: Performed by: OBSTETRICS & GYNECOLOGY

## 2019-06-13 PROCEDURE — 25800030 ZZH RX IP 258 OP 636: Performed by: OBSTETRICS & GYNECOLOGY

## 2019-06-13 PROCEDURE — 59510 CESAREAN DELIVERY: CPT | Performed by: OBSTETRICS & GYNECOLOGY

## 2019-06-13 PROCEDURE — 12000000 ZZH R&B MED SURG/OB

## 2019-06-13 PROCEDURE — 88307 TISSUE EXAM BY PATHOLOGIST: CPT | Performed by: OBSTETRICS & GYNECOLOGY

## 2019-06-13 PROCEDURE — 36000058 ZZH SURGERY LEVEL 3 EA 15 ADDTL MIN: Performed by: OBSTETRICS & GYNECOLOGY

## 2019-06-13 PROCEDURE — 25000128 H RX IP 250 OP 636: Performed by: ANESTHESIOLOGY

## 2019-06-13 PROCEDURE — 85460 HEMOGLOBIN FETAL: CPT | Performed by: OBSTETRICS & GYNECOLOGY

## 2019-06-13 PROCEDURE — 88307 TISSUE EXAM BY PATHOLOGIST: CPT | Mod: 26 | Performed by: OBSTETRICS & GYNECOLOGY

## 2019-06-13 PROCEDURE — 36000056 ZZH SURGERY LEVEL 3 1ST 30 MIN: Performed by: OBSTETRICS & GYNECOLOGY

## 2019-06-13 PROCEDURE — 59514 CESAREAN DELIVERY ONLY: CPT | Mod: 80 | Performed by: OBSTETRICS & GYNECOLOGY

## 2019-06-13 PROCEDURE — 37000009 ZZH ANESTHESIA TECHNICAL FEE, EACH ADDTL 15 MIN: Performed by: OBSTETRICS & GYNECOLOGY

## 2019-06-13 RX ORDER — HYDROCORTISONE 2.5 %
CREAM (GRAM) TOPICAL 3 TIMES DAILY PRN
Status: DISCONTINUED | OUTPATIENT
Start: 2019-06-13 | End: 2019-06-16 | Stop reason: HOSPADM

## 2019-06-13 RX ORDER — OXYCODONE HYDROCHLORIDE 5 MG/1
5-10 TABLET ORAL
Status: DISCONTINUED | OUTPATIENT
Start: 2019-06-13 | End: 2019-06-16 | Stop reason: HOSPADM

## 2019-06-13 RX ORDER — OXYTOCIN 10 [USP'U]/ML
10 INJECTION, SOLUTION INTRAMUSCULAR; INTRAVENOUS
Status: DISCONTINUED | OUTPATIENT
Start: 2019-06-13 | End: 2019-06-16 | Stop reason: HOSPADM

## 2019-06-13 RX ORDER — PROPOFOL 10 MG/ML
INJECTION, EMULSION INTRAVENOUS PRN
Status: DISCONTINUED | OUTPATIENT
Start: 2019-06-13 | End: 2019-06-13

## 2019-06-13 RX ORDER — OXYTOCIN/0.9 % SODIUM CHLORIDE 30/500 ML
340 PLASTIC BAG, INJECTION (ML) INTRAVENOUS CONTINUOUS PRN
Status: DISCONTINUED | OUTPATIENT
Start: 2019-06-13 | End: 2019-06-16 | Stop reason: HOSPADM

## 2019-06-13 RX ORDER — ONDANSETRON 4 MG/1
4 TABLET, ORALLY DISINTEGRATING ORAL EVERY 30 MIN PRN
Status: DISCONTINUED | OUTPATIENT
Start: 2019-06-13 | End: 2019-06-13

## 2019-06-13 RX ORDER — HYDRALAZINE HYDROCHLORIDE 20 MG/ML
2.5-5 INJECTION INTRAMUSCULAR; INTRAVENOUS EVERY 10 MIN PRN
Status: DISCONTINUED | OUTPATIENT
Start: 2019-06-13 | End: 2019-06-14 | Stop reason: CLARIF

## 2019-06-13 RX ORDER — AMOXICILLIN 250 MG
1 CAPSULE ORAL 2 TIMES DAILY PRN
Status: DISCONTINUED | OUTPATIENT
Start: 2019-06-13 | End: 2019-06-16 | Stop reason: HOSPADM

## 2019-06-13 RX ORDER — ONDANSETRON 2 MG/ML
INJECTION INTRAMUSCULAR; INTRAVENOUS PRN
Status: DISCONTINUED | OUTPATIENT
Start: 2019-06-13 | End: 2019-06-13

## 2019-06-13 RX ORDER — METOPROLOL TARTRATE 1 MG/ML
1-2 INJECTION, SOLUTION INTRAVENOUS EVERY 5 MIN PRN
Status: DISCONTINUED | OUTPATIENT
Start: 2019-06-13 | End: 2019-06-14 | Stop reason: CLARIF

## 2019-06-13 RX ORDER — LANOLIN 100 %
OINTMENT (GRAM) TOPICAL
Status: DISCONTINUED | OUTPATIENT
Start: 2019-06-13 | End: 2019-06-16 | Stop reason: HOSPADM

## 2019-06-13 RX ORDER — DEXTROSE MONOHYDRATE 25 G/50ML
25-50 INJECTION, SOLUTION INTRAVENOUS
Status: DISCONTINUED | OUTPATIENT
Start: 2019-06-13 | End: 2019-06-16 | Stop reason: HOSPADM

## 2019-06-13 RX ORDER — NICOTINE POLACRILEX 4 MG
15-30 LOZENGE BUCCAL
Status: DISCONTINUED | OUTPATIENT
Start: 2019-06-13 | End: 2019-06-16 | Stop reason: HOSPADM

## 2019-06-13 RX ORDER — FENTANYL CITRATE 50 UG/ML
INJECTION, SOLUTION INTRAMUSCULAR; INTRAVENOUS PRN
Status: DISCONTINUED | OUTPATIENT
Start: 2019-06-13 | End: 2019-06-13

## 2019-06-13 RX ORDER — HYDROMORPHONE HYDROCHLORIDE 1 MG/ML
.3-.5 INJECTION, SOLUTION INTRAMUSCULAR; INTRAVENOUS; SUBCUTANEOUS EVERY 10 MIN PRN
Status: DISCONTINUED | OUTPATIENT
Start: 2019-06-13 | End: 2019-06-13

## 2019-06-13 RX ORDER — DIPHENHYDRAMINE HCL 25 MG
25 CAPSULE ORAL EVERY 6 HOURS PRN
Status: DISCONTINUED | OUTPATIENT
Start: 2019-06-13 | End: 2019-06-16 | Stop reason: HOSPADM

## 2019-06-13 RX ORDER — GLYCOPYRROLATE 0.2 MG/ML
INJECTION, SOLUTION INTRAMUSCULAR; INTRAVENOUS PRN
Status: DISCONTINUED | OUTPATIENT
Start: 2019-06-13 | End: 2019-06-13

## 2019-06-13 RX ORDER — ACETAMINOPHEN 325 MG/1
650 TABLET ORAL EVERY 4 HOURS PRN
Status: DISCONTINUED | OUTPATIENT
Start: 2019-06-16 | End: 2019-06-16 | Stop reason: HOSPADM

## 2019-06-13 RX ORDER — BISACODYL 10 MG
10 SUPPOSITORY, RECTAL RECTAL DAILY PRN
Status: DISCONTINUED | OUTPATIENT
Start: 2019-06-15 | End: 2019-06-16 | Stop reason: HOSPADM

## 2019-06-13 RX ORDER — ONDANSETRON 2 MG/ML
4 INJECTION INTRAMUSCULAR; INTRAVENOUS EVERY 6 HOURS PRN
Status: DISCONTINUED | OUTPATIENT
Start: 2019-06-13 | End: 2019-06-16 | Stop reason: HOSPADM

## 2019-06-13 RX ORDER — HYDROMORPHONE HYDROCHLORIDE 1 MG/ML
.5-1 INJECTION, SOLUTION INTRAMUSCULAR; INTRAVENOUS; SUBCUTANEOUS
Status: DISCONTINUED | OUTPATIENT
Start: 2019-06-13 | End: 2019-06-14 | Stop reason: CLARIF

## 2019-06-13 RX ORDER — OXYTOCIN/0.9 % SODIUM CHLORIDE 30/500 ML
100 PLASTIC BAG, INJECTION (ML) INTRAVENOUS CONTINUOUS
Status: DISCONTINUED | OUTPATIENT
Start: 2019-06-13 | End: 2019-06-14 | Stop reason: CLARIF

## 2019-06-13 RX ORDER — MEPERIDINE HYDROCHLORIDE 50 MG/ML
12.5 INJECTION INTRAMUSCULAR; INTRAVENOUS; SUBCUTANEOUS
Status: DISCONTINUED | OUTPATIENT
Start: 2019-06-13 | End: 2019-06-14 | Stop reason: CLARIF

## 2019-06-13 RX ORDER — ALBUTEROL SULFATE 0.83 MG/ML
2.5 SOLUTION RESPIRATORY (INHALATION) EVERY 4 HOURS PRN
Status: DISCONTINUED | OUTPATIENT
Start: 2019-06-13 | End: 2019-06-14 | Stop reason: CLARIF

## 2019-06-13 RX ORDER — AMOXICILLIN 250 MG
2 CAPSULE ORAL 2 TIMES DAILY PRN
Status: DISCONTINUED | OUTPATIENT
Start: 2019-06-13 | End: 2019-06-16 | Stop reason: HOSPADM

## 2019-06-13 RX ORDER — FENTANYL CITRATE 50 UG/ML
25-50 INJECTION, SOLUTION INTRAMUSCULAR; INTRAVENOUS
Status: DISCONTINUED | OUTPATIENT
Start: 2019-06-13 | End: 2019-06-14 | Stop reason: CLARIF

## 2019-06-13 RX ORDER — MISOPROSTOL 200 UG/1
800 TABLET ORAL
Status: DISCONTINUED | OUTPATIENT
Start: 2019-06-13 | End: 2019-06-16 | Stop reason: HOSPADM

## 2019-06-13 RX ORDER — ACETAMINOPHEN 325 MG/1
975 TABLET ORAL EVERY 8 HOURS
Status: COMPLETED | OUTPATIENT
Start: 2019-06-13 | End: 2019-06-16

## 2019-06-13 RX ORDER — OXYTOCIN/0.9 % SODIUM CHLORIDE 30/500 ML
PLASTIC BAG, INJECTION (ML) INTRAVENOUS PRN
Status: DISCONTINUED | OUTPATIENT
Start: 2019-06-13 | End: 2019-06-13

## 2019-06-13 RX ORDER — KETOROLAC TROMETHAMINE 30 MG/ML
30 INJECTION, SOLUTION INTRAMUSCULAR; INTRAVENOUS EVERY 6 HOURS
Status: COMPLETED | OUTPATIENT
Start: 2019-06-13 | End: 2019-06-14

## 2019-06-13 RX ORDER — DEXTROSE, SODIUM CHLORIDE, SODIUM LACTATE, POTASSIUM CHLORIDE, AND CALCIUM CHLORIDE 5; .6; .31; .03; .02 G/100ML; G/100ML; G/100ML; G/100ML; G/100ML
INJECTION, SOLUTION INTRAVENOUS CONTINUOUS
Status: DISCONTINUED | OUTPATIENT
Start: 2019-06-13 | End: 2019-06-16 | Stop reason: HOSPADM

## 2019-06-13 RX ORDER — KETOROLAC TROMETHAMINE 30 MG/ML
30 INJECTION, SOLUTION INTRAMUSCULAR; INTRAVENOUS EVERY 6 HOURS PRN
Status: DISCONTINUED | OUTPATIENT
Start: 2019-06-13 | End: 2019-06-13

## 2019-06-13 RX ORDER — FENTANYL CITRATE 50 UG/ML
25-50 INJECTION, SOLUTION INTRAMUSCULAR; INTRAVENOUS EVERY 5 MIN PRN
Status: DISCONTINUED | OUTPATIENT
Start: 2019-06-13 | End: 2019-06-14 | Stop reason: CLARIF

## 2019-06-13 RX ORDER — SIMETHICONE 80 MG
80 TABLET,CHEWABLE ORAL 4 TIMES DAILY PRN
Status: DISCONTINUED | OUTPATIENT
Start: 2019-06-13 | End: 2019-06-16 | Stop reason: HOSPADM

## 2019-06-13 RX ORDER — NALOXONE HYDROCHLORIDE 0.4 MG/ML
.1-.4 INJECTION, SOLUTION INTRAMUSCULAR; INTRAVENOUS; SUBCUTANEOUS
Status: DISCONTINUED | OUTPATIENT
Start: 2019-06-13 | End: 2019-06-16 | Stop reason: HOSPADM

## 2019-06-13 RX ORDER — IBUPROFEN 800 MG/1
800 TABLET, FILM COATED ORAL EVERY 6 HOURS PRN
Status: DISCONTINUED | OUTPATIENT
Start: 2019-06-14 | End: 2019-06-16 | Stop reason: HOSPADM

## 2019-06-13 RX ORDER — SODIUM CHLORIDE, SODIUM LACTATE, POTASSIUM CHLORIDE, CALCIUM CHLORIDE 600; 310; 30; 20 MG/100ML; MG/100ML; MG/100ML; MG/100ML
INJECTION, SOLUTION INTRAVENOUS CONTINUOUS
Status: DISCONTINUED | OUTPATIENT
Start: 2019-06-13 | End: 2019-06-14 | Stop reason: CLARIF

## 2019-06-13 RX ORDER — DIPHENHYDRAMINE HYDROCHLORIDE 50 MG/ML
25 INJECTION INTRAMUSCULAR; INTRAVENOUS EVERY 6 HOURS PRN
Status: DISCONTINUED | OUTPATIENT
Start: 2019-06-13 | End: 2019-06-16 | Stop reason: HOSPADM

## 2019-06-13 RX ORDER — CEFAZOLIN SODIUM 2 G/100ML
INJECTION, SOLUTION INTRAVENOUS PRN
Status: DISCONTINUED | OUTPATIENT
Start: 2019-06-13 | End: 2019-06-13

## 2019-06-13 RX ORDER — NALOXONE HYDROCHLORIDE 0.4 MG/ML
.1-.4 INJECTION, SOLUTION INTRAMUSCULAR; INTRAVENOUS; SUBCUTANEOUS
Status: DISCONTINUED | OUTPATIENT
Start: 2019-06-13 | End: 2019-06-13

## 2019-06-13 RX ORDER — LIDOCAINE 40 MG/G
CREAM TOPICAL
Status: DISCONTINUED | OUTPATIENT
Start: 2019-06-13 | End: 2019-06-16 | Stop reason: HOSPADM

## 2019-06-13 RX ORDER — METHYLERGONOVINE MALEATE 0.2 MG/ML
200 INJECTION INTRAVENOUS
Status: DISCONTINUED | OUTPATIENT
Start: 2019-06-13 | End: 2019-06-16 | Stop reason: HOSPADM

## 2019-06-13 RX ORDER — SODIUM CHLORIDE, SODIUM LACTATE, POTASSIUM CHLORIDE, CALCIUM CHLORIDE 600; 310; 30; 20 MG/100ML; MG/100ML; MG/100ML; MG/100ML
INJECTION, SOLUTION INTRAVENOUS CONTINUOUS PRN
Status: DISCONTINUED | OUTPATIENT
Start: 2019-06-13 | End: 2019-06-13

## 2019-06-13 RX ORDER — CARBOPROST TROMETHAMINE 250 UG/ML
250 INJECTION, SOLUTION INTRAMUSCULAR
Status: DISCONTINUED | OUTPATIENT
Start: 2019-06-13 | End: 2019-06-16 | Stop reason: HOSPADM

## 2019-06-13 RX ORDER — DEXAMETHASONE SODIUM PHOSPHATE 4 MG/ML
INJECTION, SOLUTION INTRA-ARTICULAR; INTRALESIONAL; INTRAMUSCULAR; INTRAVENOUS; SOFT TISSUE PRN
Status: DISCONTINUED | OUTPATIENT
Start: 2019-06-13 | End: 2019-06-13

## 2019-06-13 RX ORDER — ONDANSETRON 2 MG/ML
4 INJECTION INTRAMUSCULAR; INTRAVENOUS EVERY 30 MIN PRN
Status: DISCONTINUED | OUTPATIENT
Start: 2019-06-13 | End: 2019-06-13

## 2019-06-13 RX ORDER — OXYCODONE HYDROCHLORIDE 5 MG/1
5 TABLET ORAL EVERY 4 HOURS PRN
Status: DISCONTINUED | OUTPATIENT
Start: 2019-06-13 | End: 2019-06-13

## 2019-06-13 RX ADMIN — DEXAMETHASONE SODIUM PHOSPHATE 4 MG: 4 INJECTION, SOLUTION INTRA-ARTICULAR; INTRALESIONAL; INTRAMUSCULAR; INTRAVENOUS; SOFT TISSUE at 06:28

## 2019-06-13 RX ADMIN — Medication 2.5 MILLION UNITS: at 05:29

## 2019-06-13 RX ADMIN — SODIUM CHLORIDE, POTASSIUM CHLORIDE, SODIUM LACTATE AND CALCIUM CHLORIDE: 600; 310; 30; 20 INJECTION, SOLUTION INTRAVENOUS at 07:47

## 2019-06-13 RX ADMIN — ONDANSETRON 4 MG: 2 INJECTION INTRAMUSCULAR; INTRAVENOUS at 06:28

## 2019-06-13 RX ADMIN — MIDAZOLAM 2 MG: 1 INJECTION INTRAMUSCULAR; INTRAVENOUS at 06:10

## 2019-06-13 RX ADMIN — FENTANYL CITRATE 50 MCG: 50 INJECTION, SOLUTION INTRAMUSCULAR; INTRAVENOUS at 06:44

## 2019-06-13 RX ADMIN — LIDOCAINE HYDROCHLORIDE 5 ML: 10 INJECTION, SOLUTION EPIDURAL; INFILTRATION; INTRACAUDAL; PERINEURAL at 05:58

## 2019-06-13 RX ADMIN — FENTANYL CITRATE 50 MCG: 50 INJECTION, SOLUTION INTRAMUSCULAR; INTRAVENOUS at 06:48

## 2019-06-13 RX ADMIN — KETOROLAC TROMETHAMINE 30 MG: 30 INJECTION, SOLUTION INTRAMUSCULAR at 19:13

## 2019-06-13 RX ADMIN — SODIUM CHLORIDE, POTASSIUM CHLORIDE, SODIUM LACTATE AND CALCIUM CHLORIDE: 600; 310; 30; 20 INJECTION, SOLUTION INTRAVENOUS at 00:51

## 2019-06-13 RX ADMIN — KETOROLAC TROMETHAMINE 30 MG: 30 INJECTION, SOLUTION INTRAMUSCULAR at 13:55

## 2019-06-13 RX ADMIN — ACETAMINOPHEN 975 MG: 325 TABLET, FILM COATED ORAL at 19:13

## 2019-06-13 RX ADMIN — FENTANYL CITRATE 50 MCG: 50 INJECTION INTRAMUSCULAR; INTRAVENOUS at 07:31

## 2019-06-13 RX ADMIN — PROPOFOL 100 MG: 10 INJECTION, EMULSION INTRAVENOUS at 06:08

## 2019-06-13 RX ADMIN — HYDROMORPHONE HYDROCHLORIDE 1 MG: 1 INJECTION, SOLUTION INTRAMUSCULAR; INTRAVENOUS; SUBCUTANEOUS at 06:57

## 2019-06-13 RX ADMIN — Medication 2.5 MILLION UNITS: at 00:52

## 2019-06-13 RX ADMIN — CEFAZOLIN SODIUM 2 G: 2 INJECTION, SOLUTION INTRAVENOUS at 06:00

## 2019-06-13 RX ADMIN — FENTANYL CITRATE 50 MCG: 50 INJECTION INTRAMUSCULAR; INTRAVENOUS at 07:22

## 2019-06-13 RX ADMIN — SENNOSIDES AND DOCUSATE SODIUM 1 TABLET: 8.6; 5 TABLET ORAL at 19:14

## 2019-06-13 RX ADMIN — HYDROMORPHONE HYDROCHLORIDE 0.5 MG: 1 INJECTION, SOLUTION INTRAMUSCULAR; INTRAVENOUS; SUBCUTANEOUS at 09:30

## 2019-06-13 RX ADMIN — SODIUM CHLORIDE, POTASSIUM CHLORIDE, SODIUM LACTATE AND CALCIUM CHLORIDE: 600; 310; 30; 20 INJECTION, SOLUTION INTRAVENOUS at 06:38

## 2019-06-13 RX ADMIN — KETOROLAC TROMETHAMINE 30 MG: 30 INJECTION, SOLUTION INTRAMUSCULAR at 07:14

## 2019-06-13 RX ADMIN — Medication 2.5 MILLION UNITS: at 05:54

## 2019-06-13 RX ADMIN — PROPOFOL 200 MG: 10 INJECTION, EMULSION INTRAVENOUS at 05:58

## 2019-06-13 RX ADMIN — HYDROMORPHONE HYDROCHLORIDE 1 MG: 1 INJECTION, SOLUTION INTRAMUSCULAR; INTRAVENOUS; SUBCUTANEOUS at 06:07

## 2019-06-13 RX ADMIN — AZITHROMYCIN MONOHYDRATE 500 MG: 500 INJECTION, POWDER, LYOPHILIZED, FOR SOLUTION INTRAVENOUS at 09:36

## 2019-06-13 RX ADMIN — SODIUM CHLORIDE, POTASSIUM CHLORIDE, SODIUM LACTATE AND CALCIUM CHLORIDE: 600; 310; 30; 20 INJECTION, SOLUTION INTRAVENOUS at 05:38

## 2019-06-13 RX ADMIN — SODIUM CHLORIDE, SODIUM LACTATE, POTASSIUM CHLORIDE, CALCIUM CHLORIDE AND DEXTROSE MONOHYDRATE: 5; 600; 310; 30; 20 INJECTION, SOLUTION INTRAVENOUS at 12:40

## 2019-06-13 RX ADMIN — GLYCOPYRROLATE 0.2 MG: 0.2 INJECTION, SOLUTION INTRAMUSCULAR; INTRAVENOUS at 06:15

## 2019-06-13 RX ADMIN — NIFEDIPINE 10 MG: 10 CAPSULE ORAL at 00:51

## 2019-06-13 RX ADMIN — OXYTOCIN-SODIUM CHLORIDE 0.9% IV SOLN 30 UNIT/500ML 500 ML: 30-0.9/5 SOLUTION at 06:03

## 2019-06-13 RX ADMIN — FENTANYL CITRATE 100 MCG: 50 INJECTION, SOLUTION INTRAMUSCULAR; INTRAVENOUS at 06:07

## 2019-06-13 RX ADMIN — ACETAMINOPHEN 975 MG: 325 TABLET, FILM COATED ORAL at 11:36

## 2019-06-13 RX ADMIN — SUCCINYLCHOLINE CHLORIDE 140 MG: 20 INJECTION, SOLUTION INTRAMUSCULAR; INTRAVENOUS at 05:58

## 2019-06-13 RX ADMIN — OXYTOCIN-SODIUM CHLORIDE 0.9% IV SOLN 30 UNIT/500ML 500 ML: 30-0.9/5 SOLUTION at 06:31

## 2019-06-13 RX ADMIN — OXYTOCIN-SODIUM CHLORIDE 0.9% IV SOLN 30 UNIT/500ML 100 ML/HR: 30-0.9/5 SOLUTION at 07:46

## 2019-06-13 RX ADMIN — HYDROMORPHONE HYDROCHLORIDE 0.5 MG: 1 INJECTION, SOLUTION INTRAMUSCULAR; INTRAVENOUS; SUBCUTANEOUS at 07:16

## 2019-06-13 RX ADMIN — HYDROMORPHONE HYDROCHLORIDE 0.5 MG: 1 INJECTION, SOLUTION INTRAMUSCULAR; INTRAVENOUS; SUBCUTANEOUS at 16:55

## 2019-06-13 RX ADMIN — NIFEDIPINE 10 MG: 10 CAPSULE ORAL at 05:46

## 2019-06-13 NOTE — ANESTHESIA CARE TRANSFER NOTE
Patient: Seth Null    Procedure(s):   SECTION    Diagnosis:  section  Diagnosis Additional Information: No value filed.    Anesthesia Type:   No value filed.     Note:  Airway :Nasal Cannula  Patient transferred to:PACU  Comments: Madie Report: Identifed the Patient, Identified the Reponsible Provider, Reviewed the pertinent medical history, Discussed the surgical course, Reviewed Intra-OP anesthesia mangement and issues during anesthesia, Set expectations for post-procedure period and Allowed opportunity for questions and acknowledgement of understanding      Vitals: (Last set prior to Anesthesia Care Transfer)    CRNA VITALS  2019 0610 - 2019 0701      2019             NIBP:  109/62    Pulse:  122                Electronically Signed By: DONOVAN Nunez CRNA  2019  7:01 AM

## 2019-06-13 NOTE — ANESTHESIA PREPROCEDURE EVALUATION
Anesthesia Pre-Procedure Evaluation    Patient: Seth Null   MRN: [id number] : 1990          Preoperative Diagnosis:  section    Procedure(s):   SECTION    Past Medical History:   Diagnosis Date     Diabetes (H)     GDM diet     NO ACTIVE PROBLEMS (aka NONE)      Past Surgical History:   Procedure Laterality Date     MAMMOPLASTY REDUCTION       Anesthesia Evaluation     . Pt has had prior anesthetic.            ROS/MED HX    ENT/Pulmonary:  - neg pulmonary ROS     Neurologic:       Cardiovascular:  - neg cardiovascular ROS       METS/Exercise Tolerance:     Hematologic:         Musculoskeletal:         GI/Hepatic:         Renal/Genitourinary:         Endo:         Psychiatric:         Infectious Disease:         Malignancy:         Other:                       (+) gestational diabetes          Physical Exam      Airway   Mallampati: II  TM distance: >3 FB  Neck ROM: full    Dental     Cardiovascular   Rhythm and rate: regular and normal      Pulmonary    breath sounds clear to auscultation            Lab Results   Component Value Date    WBC 12.9 (H) 2019    HGB 13.6 2019    HCT 39.5 2019     2019    CRP <2.9 2018    SED 5 2018     2018    POTASSIUM 4.6 2018    CHLORIDE 106 2018    CO2 25 2018    BUN 13 2018    CR 0.62 2018    GLC 89 2018    SALO 9.1 2018    ALBUMIN 4.4 2018    PROTTOTAL 7.6 2018    ALT 20 2018    AST 19 2018    ALKPHOS 44 2018    BILITOTAL 0.5 2018    PTT 29 2019    INR 0.92 2019    FIBR 491 (H) 2019       Preop Vitals  BP Readings from Last 3 Encounters:   19 117/70   19 112/74   19 110/68    Pulse Readings from Last 3 Encounters:   19 72   19 105   18 85      Resp Readings from Last 3 Encounters:   19 16   19 16   17 17    SpO2 Readings from Last 3 Encounters:  "  06/13/19 96%   02/21/19 99%   06/22/18 99%      Temp Readings from Last 1 Encounters:   06/13/19 99  F (37.2  C) (Oral)    Ht Readings from Last 1 Encounters:   06/12/19 1.651 m (5' 5\")      Wt Readings from Last 1 Encounters:   06/12/19 77.1 kg (170 lb)    Estimated body mass index is 28.29 kg/m  as calculated from the following:    Height as of an earlier encounter on 6/12/19: 1.651 m (5' 5\").    Weight as of an earlier encounter on 6/12/19: 77.1 kg (170 lb).       Anesthesia Plan      History & Physical Review  History and physical reviewed and following examination; no interval change.    ASA Status:  2 emergent.    NPO Status:  Waived due to emergency    Plan for General, RSI and ETT with Propofol and Intravenous induction. Maintenance will be Balanced.    PONV prophylaxis:  Ondansetron (or other 5HT-3)       Postoperative Care  Postoperative pain management:  IV analgesics, Oral pain medications and Multi-modal analgesia.      Consents  Anesthetic plan, risks, benefits and alternatives discussed with:  Patient..                 Marty Mendoza MD                    .  "

## 2019-06-13 NOTE — OP NOTE
[facility]    OB Operative Note    Pre-operative diagnosis:  Pregnancy, Gestational age: 32w6d,  contractions with cervical dilation, Category 3 fetal heart tones (Fetal bradycardia)   Post-operative diagnosis Same, Nuchal cord x1   Procedure: Procedure(s):  CODE EMERGENT  SECTION, Low Transverse Uterine Incision, Primary     Surgeon: Jayshree Danielle DO   Assistants(s): MD Dr. Erica Davis actively first assisted during this operation providing necessary retraction and exposure as well as maintaining hemostasis and a clear operative field. This was helpful in allowing the operation to proceed in a safe and expeditious manner.    Anesthesia: General    Estimated blood loss: 450mL    Total IV fluids: (See anesthesia record)   Blood transfusion: No transfusion was given during surgery   Total urine output: (See anesthesia record)   Drains: None   Specimens:     ID Type Source Tests Collected by Time Destination   1 :  Cord blood, venous Blood OR DOCUMENTATION ONLY Jayshree Danielle DO 2019  6:05 AM    2 :  Cord blood, arterial Blood OR DOCUMENTATION ONLY Jayshree Danielle DO 2019  6:05 AM    3 :  Cord blood Blood OR DOCUMENTATION ONLY Jayshree Danielle DO 2019  6:02 AM    A : placenta  Placenta Placenta PLACENTA PATH ORDER AND INDICATIONS Jayshree Danielle DO 2019  6:05 AM       Complications: None   Condition: Mother stable, transfered to post-anesthesia recovery   transferred to NICU         Indication: Patient was initially evaluated in the clinic and noted to have low fetal heart tones on office Doppler by CNM.  An NST was then performed and reported to be reactive however she was noted to have 3 contractions over 50 to 60 mins and was therefore sent to triage for evaluation.  The patient initially had a category 1 strip however proceeded to have a non provoked spontaneous prolonged deceleration while in triage. I went in to evaluate  as I was at the labor desk. The decel resolved with position change, IV placement and IV fluids. Cervix was 2/70/-2 with irregular contractions on the toco.  No leakage of fluid, vaginal bleeding, acute abdominal pain.  ROM plus negative. Care was then transferred from [facility] to physician management. A BPP was performed and resulted , ANATOLIY 11, posterior placenta, cephalic.  She had no clear evidence of abruption. She was admitted, received betamethasone, and was started on Procardia for tocolysis as well as penicillin for GBS unknown status. MFM was consulted who was in agreement with this plan.  Repeat exam that evening showed no cervical change.  Her contractions dissipated overnight. She then began feeling increased mild cramping with contractions every 2 to 5 minutes and had 2 spontaneous prolonged decelerations between 3 to 4 AM. Fetal heart rate strip remained Category 1 between these events. Our covering M was again consulted who reviewed the fetal heart rate strip, patient chart and recent MF scan from  and recommended coagulation labs,  Kleihauer-Betke and bedside ultrasound. A differential was discussed, cause for decelerations unclear.  Cervical exam was also performed during this time with no cervical change, bloody show, or evidence for rupture or cord.  The patient and I discussed the potential need for a  section and she was counseled regarding risks and benefits of the procedure.  She expressed understanding. Fetal heart rate remained Category 1 until she had a spontaneous deceleration into the 50s with no variability. She consented to primary section and a Code  section was called at 5:52 AM.  Incision time 6 AM, delivery time 6:02.       Findings: A viable female  born at 6:02AM weighing 4lbs 9oz and with APGARs of 7 and 8 at 1 and 5 minutes respectively. A nuchal cord was noted at the time of delivery. Amniotic fluid was noted to be clear. No port wine stained fluid or  evidence for abruption was identified.  The placenta was without obvious abnormality. Normal fallopian tubes and ovaries were noted. No uterine abnormalities. The uterus was pink, enlarged and smooth in contour. Clear urine output was noted in the catheter bag at the conclusion of the procedure.                 DESCRIPTION OF OPERATIVE PROCEDURE: After insuring informed consent, the patient was taken to the operating room and general anesthesia was initiated.  The patient was then transferred to the dorsal supine position with left lateral tilt.  A mckeon catheter was placed and set to bedside drainage. The abdomen was STAT prepped and draped.    A Pfannenstiel skin incision was made using a scalpel and carried through to the level of the fascia.  The fascia was then incised using a scalpel and then extended bilaterally manually.  The fascia was then manually elevated and and sharply dissected first superiorly and then inferiorly from the rectus muscles. The rectus muscles were then  in the midline and the peritoneum was identified, entered bluntly, and then extended manually.     A retractor was then inserted and the lower uterine segment was incised in a tranverse fashion with the scalpel, then extended manually.  The retractor was then removed. The 's head was delivered atraumatically. A nuchal cord was reduced. Delivery of the 's body when followed. The umbilical cord was immediately clamped x 2 and cut.  The  was taken to the radiant warmer to the awaiting nursery team.  Cord gases and cord blood was obtained.     The placenta was then removed with cord traction and fundal massage. The placenta was sent to pathology. The uterus was cleared of all clots and debris.  The uterine incision was repaired using 0 vicryl in a running locking fashion starting at the left apex and working toward the right apex. An imbricating layer was then placed using 4-0 Monocryl. Uterine incision was  inspected and found to be hemostatic.     The fascia and muscle were inspected for any areas of bleeding. None were found. The fascia was reapproximated using 0-vicryl in a simple running fashion starting at the left apex and ending at the right apex. The subcutaneous tissue was irrigated with warm water. A few small bleeding sites were cauterized using electrocautery. Hemostasis assured. The skin was reapproximated using 4-0 monocryl in a running subcuticular fashion.  Steri strips were applied over the incision.  Lower uterine segment and vagina were cleared of clots and the fundus was noted to be firm.    The patient tolerated this procedure well.  All needle, sponge and instrument counts were correct times two.  The patient was taken to the recovery room in an awake and stable condition.       Jayshree Danielle DO  OB/GYN

## 2019-06-13 NOTE — PROVIDER NOTIFICATION
06/13/19 1554   Provider Notification   Provider Name/Title Dr. Lerner   Method of Notification Phone   Request Evaluate-Remote   Notification Reason Other   MD notified of incorrect swab done for chlamydia/gonorrhea swab and unable to run tests on patient. Per MD, no need to redo swab.

## 2019-06-13 NOTE — PROVIDER NOTIFICATION
06/13/19 0845   Provider Notification   Provider Name/Title Dr. Danielle   Method of Notification Phone   Clarification for capnography (PACU RN sent machine up but no orders in computer) and request for IV pain medication order. MD states to page MDA to see if he wants capnography. MD orders for IV Dilaudid 0.5 mg-1 mg q1h PRN. MD also states she is ordering a one time dose of IV Zithromax for infection prophylaxis.     Orders received and relayed to JOSÉ MIGUEL Toledo, who is assuming cares at this time.

## 2019-06-13 NOTE — PROVIDER NOTIFICATION
19 0546   Labor Care Interventions   Labor Interventions intravenous fluid bolus administered;oxygen therapy initiated;provider notified   PD;kishor to 57 for more then 3 min, slow returned to 90,then again went down to 55-57.changed position from left to right hands and knees.MD Danielle was present during episode and she called for Code  section.Extra help called and pt transferred to OR at 0553.Family member Roxana(pt's sister) explained situation  By writer.

## 2019-06-13 NOTE — PROVIDER NOTIFICATION
06/13/19 0855   Provider Notification   Provider Name/Title Dr. Alonzo   Method of Notification Phone   Orders for capnography per protocol.

## 2019-06-13 NOTE — PLAN OF CARE
Transferred to room 425 from PACU at 08.20 am. IV pitocin infusing, mckeon catheter patent and draining. Island dressing over incision scant drainage marked. Ice to incision. Pain score 5. Diulaidid given. Capnography set up to commence monitoring as patient had general anaesthesia. Patients sister present . FOB making his way to be with patient. Baby in NICU.   Patients mobililty level scored using the bedside mobility assistance tool (BMAT). Patient is at a mobility level test number: 1. Mobility equipment used: [employer]. Required assist of 2 staff members. Further use of BMAT scoring required    Patient remains stable, pain score 5 remains unchanged but acceptable level. Oral Tylenol given and tolerated this afternoon, scheduled Toradol given. .Mckeon catheter draining large amounts of urine, jessica care done. Incision site intact. Capnography continues. Patient down to NICU on her bed this shift with FOB. Tolerating water and crackers. Stable at this time.

## 2019-06-13 NOTE — PROVIDER NOTIFICATION
06/12/19 2115   Provider Notification   Provider Name/Title Dr Danielle   Method of Notification Phone   Request Evaluate - Remote   Notification Reason Status Update;Other (Comment);Decels   Paged MD re current status,questions reg diabetic orders,NPO status,Conts irregular,FHT questionable decl X1.Otherthen category 1.Plan is to check SVE if unchanged,pt can have light meal check postprandial after one hour,and follow Diabetic orders.POC d/w pt and she voiced understandings.

## 2019-06-13 NOTE — PROVIDER NOTIFICATION
06/12/19 6128   Labor Care Interventions   Labor Interventions provider notified   Provider Notification   Provider Name/Title Dr Danielle   Method of Notification Phone   Request Evaluate - Remote   Notification Reason Decels;Other (Comment)   Paged MD re PD for 4.5 min kishor to 90s and interventions;position changed,IV fluid bolus and Oxygen therapy and pt reported no conts.Plan is to continue watch and monitor,continue Penicillin antibiotic for unknown GBS and MD notified as needed.POC d/w pt and she voiced understandings.

## 2019-06-13 NOTE — ANESTHESIA POSTPROCEDURE EVALUATION
Patient: Seth Null    Procedure(s):   SECTION    Diagnosis: section  Diagnosis Additional Information: No value filed.    Anesthesia Type:  No value filed.    Note:  Anesthesia Post Evaluation    Patient location during evaluation: PACU  Patient participation: Able to fully participate in evaluation  Level of consciousness: awake  Pain management: adequate  Airway patency: patent  Cardiovascular status: acceptable  Respiratory status: acceptable  Hydration status: acceptable  PONV: none     Anesthetic complications: None          Last vitals:  Vitals:    19 0710 19 0715 19 0720   BP: (!) 113/97 112/82 109/82   Pulse: 113 102 101   Resp: 11 12 11   Temp:      SpO2: 100% 100% 100%         Electronically Signed By: Nelson Alonzo MD  2019  7:39 AM

## 2019-06-13 NOTE — PROGRESS NOTES
Category 1 Strip with 3 prolonged decelerations in last 3 hours. Now cramping, every 2-5 on toco. Spoke with MFM, plan Stat labs: CBC, KB, Fibrinogen, INT/PT, PTT, Fibrinogen, Bedside ultrasound.  Jayshree Danielle, DO  OB/GYN

## 2019-06-13 NOTE — H&P
[facility] Labor and Delivery History and Physical    Seth Null MRN# [id number]   Age: 28 year old YOB: 1990     Date of Admission:  2019    Primary care provider: Natasha Koch  Primary OB provider: Jayshree Danielle           Chief Complaint:   Seth Null is a 28 year old female who is 32w5d pregnant and being admitted for  Contractions, Cervical Dilation, Prolonged Fetal Heart Rate Deceleration in Triage.    Patient is a transfer of care from [facility] care to OB. I assumed care given the above and  status.   eSth was noted to have decelerations in the fetal heart rate on doppler in the office today. An NST was reactive with no decelerations however she was noted to being having regular contractions on toco. She was sent to L&D triage where she then had a prolonged deceleration that resolved with position changes, insertion of an IV and initiation of IV fluids. She was noted to be having contractions every 1.5-3 minutes, non-painful. Cervix was dilated at 2/70/-2, cephalic. A BPP was obtained and pending.    She denies vaginal bleeding, leakage of fluid, vaginal discharge, abdominal pain, dysuria, recent intercourse. Reports active fetal movement.     Pregnancy complicated by gestational diabetes - well controlled with diet. She had an [facility] growth ultrasound last week per her report, normal (not in images for review).    Her  flew to [place] today for work. She is reaching out to her  to see if he can return tonight.            Pregnancy history:     OBSTETRIC HISTORY:    OB History    Para Term  AB Living   1 0 0 0 0 0   SAB TAB Ectopic Multiple Live Births   0 0 0 0 0      # Outcome Date GA Lbr Flavio/2nd Weight Sex Delivery Anes PTL Lv   1 Current                EDC: Estimated Date of Delivery: Aug 2, 2019    Prenatal Labs:   Lab Results   Component Value Date    ABO O 2019    RH Neg 2019    AS Pos (A) 2019     HEPBANG Nonreactive 2018    CHPCRT Negative 2019    GCPCRT Negative 2019    HGB 14.5 2019       GBS Status:   No results found for: GBS    Active Problem List  Patient Active Problem List   Diagnosis     Female infertility     Ganglion cyst of wrist, right     Encounter for triage in pregnant patient      contractions       Medication Prior to Admission  Medications Prior to Admission   Medication Sig Dispense Refill Last Dose     Prenatal Vit-Fe Fumarate-FA (PRENATAL MULTIVITAMIN W/IRON) 27-0.8 MG tablet Take 1 tablet by mouth daily   2019 at Unknown time     acetone urine (KETOSTIX) test strip Test once daily 25 each 1 Taking     blood glucose (NO BRAND SPECIFIED) lancets standard Use to test blood sugar 4 times daily or as directed. 100 each 1 Taking     blood glucose (NO BRAND SPECIFIED) test strip Use to test blood sugar 4 times daily or as directed. 100 strip 1 Taking     blood glucose monitoring (NO BRAND SPECIFIED) meter device kit Use to test blood sugar 4 times daily or as directed. 1 kit 0 Taking   .        Maternal Past Medical History:     Past Medical History:   Diagnosis Date     Diabetes (H)     GDM diet     NO ACTIVE PROBLEMS (aka NONE)                        Family History:     Family History   Problem Relation Age of Onset     Hypertension Father      Cancer Maternal Grandfather         metastatic     Family history reviewed and updated in [facility]            Social History:     Social History     Tobacco Use     Smoking status: Never Smoker     Smokeless tobacco: Never Used   Substance Use Topics     Alcohol use: No            Review of Systems:   The Review of Systems is negative other than noted in the HPI          Physical Exam:     Patient Vitals for the past 8 hrs:   BP Temp Temp src Resp   19 1910 127/76 -- -- --   19 1845 126/84 99.4  F (37.4  C) Oral 18   19 1706 124/80 98.4  F (36.9  C) Oral 18     Constitutional:   awake, alert,  cooperative, no apparent distress, and appears stated age      Cervix:   Membranes: intact   Dilation: 2   Effacement: 70%   Station:-2  Per CNM  Presentation:Cephalic  Fetal Heart Rate Tracing: Prolonged deceleration, return to Category 1  Tocometer: q 1.5-3                 Wet prep neg  ROM+ neg        Assessment:   Seth Null is a 32w5d pregnant female admitted for  Contractions, Cervical Dilation, Prolonged Fetal Heart Rate Deceleration in Triage.  Temp 99.4 - no other signs of infection  GBS unknown  Gestational diabetes - well controlled with diet          Plan:   Admit for observation - see IP orders  Stat BPP -- , cephalic, ANATOLIY 11  CBC, type and screen  NPO x4 hours, continuous fetal monitoring. May have small meal if Category 1 strip but must remain on continuous monitoring overnight.  Prophylactic PCN for unknown GBS status - may discontinue if no cervical change and patient not expected to deliver. Restart with regular contractions or cervical change.  Procardia 20mg PO followed by 10mg q 6 hours x 24 hours, may increase to 20mg q 6 hours if continued contractions and not hypotensive.  BMZ now and repeated in 24 hours   GBS collected & in process   Gonorrhea and chlamydia collection    Case discussed with MFM, Dr. Rodriguez who agrees with the above.   MFM will review her recent growth ultrasound not found in chart. MFM at [facility] Friday if additional MFM imaging determined to be indicated.     Jayshree Danielle, DO   OB/GYN

## 2019-06-13 NOTE — PROVIDER NOTIFICATION
19   Provider Notification   Provider Name/Title Dr. Danielle   Method of Notification Phone   Request Evaluate - Remote   Notification Reason Status Update   Dr. Danielle paged and updated on contraction pattern and temp 99.4 orally. Orders received for patient to be observation, and to obtain CBC and type and screen at this time. MD to enter  labor orders and diabetic orders.

## 2019-06-13 NOTE — PLAN OF CARE
Data: Seth Null transferred to [facility] via cart at 0820. Pt from PACU to NICU to see infant daughter.  Action: Receiving unit notified of transfer: Yes. Patient and family notified of room change. Report given to Paris CUEVAS RN at 0830. Belongings sent to receiving unit. Accompanied by Registered Nurse. Oriented patient to surroundings. Call light within reach. ID bands double-checked with receiving RN.  Response: Patient tolerated transfer and is stable.    Patients mobililty level scored using the bedside mobility assistance tool (BMAT). Patient is at a mobility level test number: 1. Mobility equipment used: [employer]. Required assist of 2 staff members. Further use of BMAT scoring required.

## 2019-06-13 NOTE — L&D DELIVERY NOTE
DELIVERY SUMMARY    Seth Null MRN# [id number]   Age: 28 year old YOB: 1990     ASSESSMENT & PLAN: Code  Section for Category 3 Fetal Heart Tones under General Anesthesia  Post op diagnoses: Same, Viable  female  at 32w6d, Nuchal cord x1      KB, Fibrinogen, INR/PT, PTT in process  Placenta sent to pathology  No evidence of abruption intraoperatively    to NICU  Patient to post op recovery, stable    [place] Assessment Tool Data    Gestational Age:  Gestational Age: 32w6d     Maternal temperature range:  Temp  Av.7  F (37.1  C)  Min: 97.2  F (36.2  C)  Max: 99.4  F (37.4  C)    Membranes ruptured for:   rupture date, rupture time, delivery date, or delivery time have not been documented     GBS status:  No results found for: GBS    Antibiotic Status:  Antibiotics         IV Antibiotic Given         Additional Management      Fetal Status Prior to  Delivery Category 3    Fetal Status Comments         Sepsis Prebirth Score:       Sepsis Postbirth Score:       Determination based on clinical exam after birth:       Disposition:           Delivery/Placenta Date and Time    Delivery Date:  19 Delivery Time:   6:02 AM   Placenta Date/Time:  2019  6:04 AM     Apgars    Living status:  Living   1 Minute 5 Minute 10 Minute 15 Minute 20 Minute   Skin color: 0  1       Heart rate: 2  2       Reflex irritability: 2  2       Muscle tone: 2  2       Respiratory effort: 1  1       Total: 7  8       Apgars assigned by:  DONOVAN KOCH NNP-BC     Cord     Complications:  Nuchal   Cord Blood Disposition:  Lab Gases Sent?:  Yes       Resuscitation    Methods:  None     [place] Measurements    Weight:  4 lb 9 oz       Labor Events and Shoulder Dystocia    Fetal Tracing Prior to Delivery:  Category 3  Shoulder dystocia present?:  Neg     Delivery (Maternal) (Provider to Complete) ([id number])    Episiotomy:  None  Vaginal laceration?:  No    Cervical  laceration?:  No       Blood Loss  Mother: Seth Null #[id number]   Start of Mother's Information    IO Blood Loss  19 0600 - 19 0734    EBL (mL) Anesthesia 450 mL    Total  450 mL         End of Mother's Information  Mother: Seth Null #[id number]         Delivery - Provider to Complete ([id number])    Delivering clinician:  Jayshree Danielle DO  Delivery Type (Choose the 1 that will go to the Birth History):  , Low Transverse   Specifics:  Primary nulliparius   Other personnel:   Provider Role   Chano Maldonado MD          Placenta    Immediate Cord Clamping:  Done  Date/Time:  2019  6:04 AM  Removal:  Expressed  Disposition:  Pathology     Presentation and Position    Presentation:  Vertex          Jayshree Danielle DO

## 2019-06-13 NOTE — PROVIDER NOTIFICATION
06/13/19 0420   Labor Care Interventions   Labor Interventions provider notified   Provider Notification   Provider Name/Title Dr Danielle   Method of Notification Phone   Request Evaluate - Remote   Notification Reason Decels;Other (Comment);Status Update   Paged MD re 2 episodes of PD from 8579-3430, kishor to 90 last 3-4 min and returned to baseline to 140s with moderate variability after IV fluid bolus,changing maternal position and O2 administration.now pt is feeling cramping and feels mild on palpation.Temp is 99.Plan is to continue watch and monitor,notified MD as per needed.POC d/w pt and she voiced understandings.

## 2019-06-13 NOTE — PROVIDER NOTIFICATION
06/13/19 0520   Provider Notification   Provider Name/Title Dr Danielle   Method of Notification At Bedside   Request Evaluate in Person   Notification Reason Other (Comment)   MD at  US done and talked to pt re POC.Pt voiced understandings.

## 2019-06-14 LAB
COPATH REPORT: NORMAL
GLUCOSE BLDC GLUCOMTR-MCNC: 81 MG/DL (ref 70–99)
HGB BLD-MCNC: 13.6 G/DL (ref 11.7–15.7)

## 2019-06-14 PROCEDURE — 12000000 ZZH R&B MED SURG/OB

## 2019-06-14 PROCEDURE — 00000146 ZZHCL STATISTIC GLUCOSE BY METER IP

## 2019-06-14 PROCEDURE — 25000132 ZZH RX MED GY IP 250 OP 250 PS 637: Performed by: OBSTETRICS & GYNECOLOGY

## 2019-06-14 PROCEDURE — 25000128 H RX IP 250 OP 636: Performed by: OBSTETRICS & GYNECOLOGY

## 2019-06-14 PROCEDURE — 36415 COLL VENOUS BLD VENIPUNCTURE: CPT | Performed by: OBSTETRICS & GYNECOLOGY

## 2019-06-14 PROCEDURE — 85018 HEMOGLOBIN: CPT | Performed by: OBSTETRICS & GYNECOLOGY

## 2019-06-14 RX ADMIN — IBUPROFEN 800 MG: 800 TABLET ORAL at 15:54

## 2019-06-14 RX ADMIN — ACETAMINOPHEN 975 MG: 325 TABLET, FILM COATED ORAL at 21:48

## 2019-06-14 RX ADMIN — SENNOSIDES AND DOCUSATE SODIUM 1 TABLET: 8.6; 5 TABLET ORAL at 08:00

## 2019-06-14 RX ADMIN — ACETAMINOPHEN 975 MG: 325 TABLET, FILM COATED ORAL at 05:43

## 2019-06-14 RX ADMIN — SENNOSIDES AND DOCUSATE SODIUM 1 TABLET: 8.6; 5 TABLET ORAL at 21:49

## 2019-06-14 RX ADMIN — ACETAMINOPHEN 975 MG: 325 TABLET, FILM COATED ORAL at 13:52

## 2019-06-14 RX ADMIN — KETOROLAC TROMETHAMINE 30 MG: 30 INJECTION, SOLUTION INTRAMUSCULAR at 08:00

## 2019-06-14 RX ADMIN — KETOROLAC TROMETHAMINE 30 MG: 30 INJECTION, SOLUTION INTRAMUSCULAR at 00:57

## 2019-06-14 RX ADMIN — IBUPROFEN 800 MG: 800 TABLET ORAL at 21:49

## 2019-06-14 ASSESSMENT — ACTIVITIES OF DAILY LIVING (ADL)
SWALLOWING: 0-->SWALLOWS FOODS/LIQUIDS WITHOUT DIFFICULTY
RETIRED_EATING: 0-->INDEPENDENT
COGNITION: 0 - NO COGNITION ISSUES REPORTED
TOILETING: 0-->INDEPENDENT
DRESS: 0-->INDEPENDENT
TRANSFERRING: 0-->INDEPENDENT
RETIRED_COMMUNICATION: 0-->UNDERSTANDS/COMMUNICATES WITHOUT DIFFICULTY
FALL_HISTORY_WITHIN_LAST_SIX_MONTHS: NO
AMBULATION: 0-->INDEPENDENT
BATHING: 0-->INDEPENDENT

## 2019-06-14 NOTE — PLAN OF CARE
Pt up ambulating with 1 assist . Rainey patent. Incision covered dried drainage noted . Reports adequate pain control with current pain plan. Family present and supportive. Meeting expected goals. Mother initiated pumping this evening. Patients mobililty level scored using the bedside mobility assistance tool (BMAT). Patient is at a mobility level test number: 3. Mobility equipment used: none required. Required assist of 1 staff members. Further use of BMAT scoring required.

## 2019-06-14 NOTE — PROGRESS NOTES
Patients mobililty level scored using the bedside mobility assistance tool (BMAT). Patient is at a mobility level test number: 3 Mobility equipment used:none. Required assist of 1staff member. Further use of BMAT scoring required.

## 2019-06-14 NOTE — PLAN OF CARE
Patients mobililty level scored using the bedside mobility assistance tool (BMAT). Patient is at a mobility level test number: 4. Mobility equipment used: none required. Required assist of 0 staff members. Further use of BMAT scoring not required.   Up independent in room, has voided without difficulty since mckeon removed this morning. Tolerating regular diet. Dressing removed by MD, steri strips in place, plans to shower later. oreal analgesia given for pain score of 3. Seen by NICU lactation see note. Stable at this time.

## 2019-06-14 NOTE — PLAN OF CARE
Patient meeting expected outcomes. VSS. Capnography discontinued at 0600. Infant (Valerie) is down in NICU (32w6d).  Feeding:  Pumping and hand expressing.   Incision: Low abdominal, island dressing with dried serosanguinous drainage, no signs of infection noted.  Mobility: Up to bathroom with standby assist from staff.  I&O: Rainey removed at 0630.  Pain: Well-controlled pain at the incision with IV toradol and Tylenol.   IV: SL Left hand.

## 2019-06-14 NOTE — PROGRESS NOTES
[facility] Obstetrics Post-Op / Progress Note         Assessment and Plan:    Assessment:   Post-operative day #1  emergent  Low transverse primary  section  L&D complications:  Pregnancy, Gestational age: 32w6d,  contractions with cervical dilation, Category 3 fetal heart tones (Fetal bradycardia)      Doing well.  Clean wound without signs of infection.  Normal healing wound.  No immediate surgical complications identified.  No excessive bleeding  Pain well-controlled.      Plan:   Ambulation encouraged  Monitor wound for signs of infection  Pain control measures as needed  Reportable signs and symptoms dicussed with the patient  Pt is pumping           Interval History:   Doing well.  Pain is well-controlled.  No fevers.  No history of wound drainage, warmth or significant erythema.  Good appetite.  Denies chest pain, shortness of breath, nausea or vomiting.  Ambulatory.  Breast pumping going well.          Significant Problems:      Past Medical History:   Diagnosis Date     Diabetes (H)     GDM diet     NO ACTIVE PROBLEMS (aka NONE)              Review of Systems:    The patient denies any chest pain, shortness of breath, excessive pain, fever, chills, purulent drainage from the wound, nausea or vomiting.          Medications:     All medications related to the patient's surgery have been reviewed  Current Facility-Administered Medications   Medication     [START ON 2019] acetaminophen (TYLENOL) tablet 650 mg     acetaminophen (TYLENOL) tablet 975 mg     albuterol (PROVENTIL) neb solution 2.5 mg     [START ON 6/15/2019] bisacodyl (DULCOLAX) Suppository 10 mg     Blood Bank will determine if patient is eligible for and the proper dosage of rho (D) immune globulin     carboprost (HEMABATE) injection 250 mcg     dextrose 5% in lactated ringers infusion     glucose gel 15-30 g    Or     dextrose 50 % injection 25-50 mL    Or     glucagon injection 1 mg     diphenhydrAMINE  (BENADRYL) capsule 25 mg    Or     diphenhydrAMINE (BENADRYL) injection 25 mg     fentaNYL (PF) (SUBLIMAZE) injection 25-50 mcg     fentaNYL (PF) (SUBLIMAZE) injection 25-50 mcg     hydrALAZINE (APRESOLINE) injection 2.5-5 mg     hydrocortisone 2.5 % cream     HYDROmorphone (PF) (DILAUDID) injection 0.5-1 mg     ibuprofen (ADVIL/MOTRIN) tablet 800 mg     lactated ringers BOLUS 1,000 mL     lactated ringers infusion     lactated ringers infusion     lanolin ointment     lidocaine (LMX4) cream     lidocaine 1 % 1 mL     measles, mumps and rubella vaccine (MMR) injection 0.5 mL     meperidine (DEMEROL) injection 12.5 mg     methylergonovine (METHERGINE) injection 200 mcg     metoprolol (LOPRESSOR) injection 1-2 mg     misoprostol (CYTOTEC) tablet 800 mcg     naloxone (NARCAN) injection 0.1-0.4 mg     NO Rho (D)  immune globulin (RhoGam) needed  - mother INELIGIBLE     ondansetron (ZOFRAN) injection 4 mg     ORAL Pain Medications -  may administer as ordered by surgeon for take home use     oxyCODONE (ROXICODONE) tablet 5-10 mg     oxytocin (PITOCIN) 30 units in 500 mL 0.9% NaCl infusion     oxytocin (PITOCIN) 30 units in 500 mL 0.9% NaCl infusion     oxytocin (PITOCIN) injection 10 Units     prochlorperazine (COMPAZINE) injection 10 mg     senna-docusate (SENOKOT-S/PERICOLACE) 8.6-50 MG per tablet 1 tablet    Or     senna-docusate (SENOKOT-S/PERICOLACE) 8.6-50 MG per tablet 2 tablet     simethicone (MYLICON) chewable tablet 80 mg     sodium chloride (PF) 0.9% PF flush 3 mL     sodium chloride (PF) 0.9% PF flush 3 mL     [START ON 6/15/2019] sodium phosphate (FLEET ENEMA) 1 enema     tranexamic acid (CYKLOKAPRON) infusion 1 g             Physical Exam:   Vitals were reviewed  All vitals stable  Temp: 98.3  F (36.8  C) Temp src: Oral BP: 121/74 Pulse: 76 Heart Rate: 94 Resp: 11 SpO2: 96 %      Wound clean and dry with minimal or no drainage.  Surrounding skin with minimal erythema.          Data:     All laboratory data  related to this surgery reviewed  Hemoglobin   Date Value Ref Range Status   06/13/2019 13.6 11.7 - 15.7 g/dL Final   06/12/2019 14.5 11.7 - 15.7 g/dL Final   05/10/2019 13.2 11.7 - 15.7 g/dL Final   12/03/2018 13.7 11.7 - 15.7 g/dL Final   06/22/2018 14.4 11.7 - 15.7 g/dL Final     No imaging studies have been ordered    Chito Ahmadi MD

## 2019-06-14 NOTE — LACTATION NOTE
This note was copied from a baby's chart.  JUDSON spoke with Seth on the PP unit.  I gave lactation folder for pumping for breast milk, collection of breast milk and cleaning of  Pumping supplies. She has red dots to place on collection bottles.   Pumping: I reviewed pumping strategies for pumping every 3 hours, using initiation setting, and monitoring pressure setting.  Plan: Obtain permit for donor milk use. Signed permit  Obtained with explanation of benefit and use for 2 weeks or until her milk is sufficient.           I informed Seth I will follow her until baby Flores is  discharged from NICU.

## 2019-06-14 NOTE — PROGRESS NOTES
courtesy round complete, pumping for baby in NICU. Reviewed strategies. Doing well. My Zamudio, DNP, APRN, CNM, IBCLC

## 2019-06-15 PROCEDURE — 12000000 ZZH R&B MED SURG/OB

## 2019-06-15 PROCEDURE — 25000132 ZZH RX MED GY IP 250 OP 250 PS 637: Performed by: OBSTETRICS & GYNECOLOGY

## 2019-06-15 RX ADMIN — IBUPROFEN 800 MG: 800 TABLET ORAL at 15:49

## 2019-06-15 RX ADMIN — ACETAMINOPHEN 975 MG: 325 TABLET, FILM COATED ORAL at 22:09

## 2019-06-15 RX ADMIN — SENNOSIDES AND DOCUSATE SODIUM 1 TABLET: 8.6; 5 TABLET ORAL at 09:31

## 2019-06-15 RX ADMIN — IBUPROFEN 800 MG: 800 TABLET ORAL at 03:26

## 2019-06-15 RX ADMIN — ACETAMINOPHEN 975 MG: 325 TABLET, FILM COATED ORAL at 06:09

## 2019-06-15 RX ADMIN — IBUPROFEN 800 MG: 800 TABLET ORAL at 22:10

## 2019-06-15 RX ADMIN — ACETAMINOPHEN 975 MG: 325 TABLET, FILM COATED ORAL at 14:33

## 2019-06-15 RX ADMIN — IBUPROFEN 800 MG: 800 TABLET ORAL at 09:31

## 2019-06-15 NOTE — PROGRESS NOTES
[facility] Obstetrics Post-Op / Progress Note         Assessment and Plan:    Assessment:   Post-operative day #2  Low transverse primary  section  L&D complications: Fetal bradycardia, recurrent, at 32w5d      Doing well.  Clean wound without signs of infection.  No excessive bleeding  Pain well-controlled.  Baby doing well in NICU      Plan:   Ambulation encouraged  Monitor wound for signs of infection  Pain control measures as needed  Reportable signs and symptoms dicussed with the patient  Anticipate discharge tomorrow           Interval History:   Doing well.  Pain is well-controlled.  No fevers.  No history of wound drainage, warmth or significant erythema.  Good appetite.  Denies chest pain, shortness of breath, nausea or vomiting.  Ambulatory.          Significant Problems:      Patient Active Problem List   Diagnosis     Female infertility     Ganglion cyst of wrist, right     Encounter for triage in pregnant patient      contractions     S/P  section             Review of Systems:    The patient denies any chest pain, shortness of breath, excessive pain, fever, chills, purulent drainage from the wound, nausea or vomiting.          Medications:   All medications related to the patient's surgery have been reviewed          Physical Exam:     All vitals stable  Patient Vitals for the past 12 hrs:   BP Temp Temp src Pulse Resp   06/15/19 0320 113/72 98.4  F (36.9  C) Oral 75 16     Wound clean and dry with minimal or no drainage.  Surrounding skin with minimal erythema.  Ext NT          Data:     Hemoglobin   Date Value Ref Range Status   2019 13.6 11.7 - 15.7 g/dL Final   2019 13.6 11.7 - 15.7 g/dL Final   2019 14.5 11.7 - 15.7 g/dL Final   05/10/2019 13.2 11.7 - 15.7 g/dL Final   2018 13.7 11.7 - 15.7 g/dL Final     -    Chano Maldonado MD  6/15/2019 7:38 AM

## 2019-06-15 NOTE — PLAN OF CARE
Pt visiting baby in NICU with spouse. Pt pumping, states she gets some drops, discussed history of breast reduction and complications with lactation. Pt taking ibuprofen and tylenol for pain with decrease in pain stated. Educated patient on importance of being able to get up moving and covering pain (oxycodone available) as necessary to be able to tolerate activity. Pt denies SOB, chest pain, dizziness, headache, and changes in vision.

## 2019-06-15 NOTE — PLAN OF CARE
Pt up ambulating independently. Voiding without difficulty. Incision CDI with steri-strips. Reports adequate pain control with current pain plan. Family present and supportive. Meeting expected goals. Mother is pumping , encouraged her to continue pumping to stimulate breasts.

## 2019-06-15 NOTE — PLAN OF CARE
Patient meeting expected outcomes, VSS. Infant (Adalynn) in NICU. Attempting to pump, has yet to have a measurable amount. Skin to skin completed with infant 6/14. Low abdominal incision is well approximated, steri strips with some dried drainage present, no s/s of infection noted on exam. Up ad molly, cares performed independently. Soreness at incision well managed with Ibuprofen/Tylenol.

## 2019-06-16 VITALS
HEART RATE: 71 BPM | RESPIRATION RATE: 16 BRPM | SYSTOLIC BLOOD PRESSURE: 138 MMHG | OXYGEN SATURATION: 96 % | DIASTOLIC BLOOD PRESSURE: 81 MMHG | TEMPERATURE: 98.1 F

## 2019-06-16 PROCEDURE — 25000132 ZZH RX MED GY IP 250 OP 250 PS 637: Performed by: OBSTETRICS & GYNECOLOGY

## 2019-06-16 RX ORDER — BREAST PUMP
1 EACH MISCELLANEOUS DAILY PRN
Qty: 1 EACH | Refills: 0 | Status: SHIPPED | OUTPATIENT
Start: 2019-06-16 | End: 2021-04-08

## 2019-06-16 RX ORDER — DOCUSATE SODIUM 100 MG/1
100 CAPSULE, LIQUID FILLED ORAL 2 TIMES DAILY
Qty: 90 CAPSULE | Refills: 1 | Status: SHIPPED | OUTPATIENT
Start: 2019-06-16 | End: 2020-03-31

## 2019-06-16 RX ORDER — IBUPROFEN 800 MG/1
800 TABLET, FILM COATED ORAL EVERY 8 HOURS PRN
Qty: 60 TABLET | Refills: 1 | Status: SHIPPED | OUTPATIENT
Start: 2019-06-16 | End: 2019-06-26

## 2019-06-16 RX ORDER — OXYCODONE HYDROCHLORIDE 5 MG/1
5 TABLET ORAL EVERY 6 HOURS PRN
Qty: 10 TABLET | Refills: 0 | Status: SHIPPED | OUTPATIENT
Start: 2019-06-16 | End: 2019-06-26

## 2019-06-16 RX ADMIN — ACETAMINOPHEN 650 MG: 325 TABLET, FILM COATED ORAL at 10:22

## 2019-06-16 RX ADMIN — ACETAMINOPHEN 975 MG: 325 TABLET, FILM COATED ORAL at 05:51

## 2019-06-16 RX ADMIN — IBUPROFEN 800 MG: 800 TABLET ORAL at 05:52

## 2019-06-16 RX ADMIN — SENNOSIDES AND DOCUSATE SODIUM 1 TABLET: 8.6; 5 TABLET ORAL at 08:41

## 2019-06-16 NOTE — PROGRESS NOTES
[facility]   Obstetrics Post-Op / Progress Note         Assessment and Plan:    Assessment:   Post-operative day #1  Low transverse primary  section  L&D complications:  section, abruption       Doing well.      Plan:   Ambulation encouraged  Discharge later today            Interval History:   Doing well.  Pain is well-controlled.  No fevers.  No history of wound drainage, warmth or significant erythema.  Good appetite.  Denies chest pain, shortness of breath, nausea or vomiting.  Ambulatory.  Breastfeeding well.          Significant Problems:    None          Review of Systems:    CONSTITUTIONAL: NEGATIVE for fever, chills, change in weight  INTEGUMENTARY/SKIN: NEGATIVE for worrisome rashes, moles or lesions  EYES: NEGATIVE for vision changes or irritation  ENT/MOUTH: NEGATIVE for ear, mouth and throat problems  RESP: NEGATIVE for significant cough or SOB  BREAST: NEGATIVE for masses, tenderness or discharge  CV: NEGATIVE for chest pain, palpitations or peripheral edema  GI: NEGATIVE for nausea, abdominal pain, heartburn, or change in bowel habits  : NEGATIVE for frequency, dysuria, or hematuria  MUSCULOSKELETAL: NEGATIVE for significant arthralgias or myalgia  NEURO: NEGATIVE for weakness, dizziness or paresthesias  ENDOCRINE: NEGATIVE for temperature intolerance, skin/hair changes  HEME: NEGATIVE for bleeding problems  PSYCHIATRIC: NEGATIVE for changes in mood or affect          Medications:   All medications related to the patient's surgery have been reviewed  -          Physical Exam:     All vitals stable  Patient Vitals for the past 8 hrs:   BP Temp Temp src Heart Rate Resp   19 0130 126/81 98.5  F (36.9  C) Oral 87 16     Wound clean and dry with minimal or no drainage.  Surrounding skin with minimal erythema.          Data:     All laboratory data related to this surgery reviewed  Hemoglobin   Date Value Ref Range Status   2019 13.6 11.7 - 15.7 g/dL Final    06/13/2019 13.6 11.7 - 15.7 g/dL Final   06/12/2019 14.5 11.7 - 15.7 g/dL Final   05/10/2019 13.2 11.7 - 15.7 g/dL Final   12/03/2018 13.7 11.7 - 15.7 g/dL Final     -    DO Dr. Tina Chaudhary, DO    OB/GYN   [facility]

## 2019-06-16 NOTE — PLAN OF CARE
Patient meeting expected outcomes, VSS. Visiting infant (Valerie) in NICU, supported by . Low abdominal incision is well approximated, steri-strips, no s/s of infection noted. Up ad molly, independent with cares. Pain at incision well managed with tylenol/ibuprofen. Planning to discharge, bed and board if possible. Social work consult ordered on previous shift, yet to be seen (EGA 32w6d, EPD Scale =9).

## 2019-06-16 NOTE — DISCHARGE INSTRUCTIONS
Follow up in 1-2 weeks   Call [phone] for concerns  You may also directly page me by texting [phone] if concerns arise. Text me your name and number      Call and ask to be seen or talk to a doctor for the following (You can go to the ob triage button on answering service line [phone]):        Increased bleeding, fever, general unwell feeling or increased pain.   Please call for any reason or concern!     Dr. Tina Lewis, DO    OB/GYN   [facility]    Postop  Birth Instructions  [facility] Lactation: [phone]    Activity       Do not lift more than 10 pounds for 6 weeks after surgery.  Ask family and friends for help when you need it.    No driving until you have stopped taking your pain medications (usually two weeks after surgery).    No heavy exercise or activity for 6 weeks.  Don't do anything that will put a strain on your surgery site.    Don't strain when using the toilet.  Your care team may prescribe a stool softener if you have problems with your bowel movements.     To care for your incision:       Keep the incision clean and dry.    Do not soak your incision in water. No swimming or hot tubs until it has fully healed. You may soak in the bathtub if the water level is below your incision.    Do not use peroxide, gel, cream, lotion, or ointment on your incision.    Adjust your clothes to avoid pressure on your surgery site (check the elastic in your underwear for example).     You may see a small amount of clear or pink drainage and this is normal.  Check with your health care provider:       If the drainage increases or has an odor.    If the incision reddens, you have swelling, or develop a rash.    If you have increased pain and the medicine we prescribed doesn't help.    If you have a fever above 100.4 F (38 C) with or without chills when placing thermometer under your tongue.   The area around your incision (surgery wound), will  feel numb.  This is normal. The numbness should go away in less than a year.     Keep your hands clean:  Always wash your hands before touching your incision (surgery wound). This helps reduce your risk of infection. If your hands aren't dirty, you may use an alcohol hand-rub to clean your hands. Keep your nails clean and short.    Call your healthcare provider if you have any of these symptoms:       You soak a sanitary pad with blood within 1 hour, or you see blood clots larger than a golf ball.    Bleeding that lasts more than 6 weeks.    Vaginal discharge that smells bad.    Severe pain, cramping or tenderness in your lower belly area.    A need to urinate more frequently (use the toilet more often), more urgently (use the toilet very quickly), or it burns when you urinate.    Nausea and vomiting.    Redness, swelling or pain around a vein in your leg.    Problems breastfeeding or a red or painful area on your breast.    Chest pain and cough or are gasping for air.    Problems with coping with sadness, anxiety or depression. If you have concerns about hurting yourself or the baby, call your provider immediately.      You have questions or concerns after you return home.

## 2019-06-16 NOTE — CONSULTS
Care Transition Initial Assessment - SW     Met with: PATIENT,FAMILY  Active Problems:    Encounter for triage in pregnant patient     contractions    S/P  section       DATA  Lives With: spouse      Quality of Family Relationships: helpful, involved, supportive  Description of Support System: Supportive, Involved  Who is your support system?: , Parent(s), Sibling(s)  Support Assessment: Adequate family and caregiver support, Adequate social supports.   Identified issues/concerns regarding health management: Pt's baby is in th NICU, SW to provide support.     Quality of Family Relationships: helpful, involved, supportive    ASSESSMENT  Cognitive Status:  Pt's affect was appropriate throughout the encounter.  Concerns to be addressed: Pt's baby is in th NICU, SW to provide support. Pt was standing and watching baby on the NICU monitor. Pt's /FOB was in the NICU with family. Pt's brother and niece and nephew were present. Pt has support from a lot of friends and family. Pt plans to discharge as a pt today but stay for room and board at least 1 night. SW and pt discussed baby's progress. SW discussed pt's [place] assessment in which she scored a nine. Pt explained that she has been experienced a lot of things that were unexpected such as baby coming early. Pt does not have a history of depression nor anxiety and denies any concerns at this time. SW gave pt printed information on postpartum depression and anxiety as well as community resources. SW explained that SWS is available during their stay upon request.    PLAN  Patient anticipates discharging as a pt today but will stay for room and board.     Sandra Montiel, [facility]   Casual SW [phone]

## 2019-06-16 NOTE — PLAN OF CARE
Pt doing well and feels ready to discharge to bed and board today. Plans to go home for a few hours break today and then will return for bed and board tonight. Medical Grade Breast Pump Rx given to fill tomorrow. Steri strips intact. Good pain control with Ibu and Tylenol.  present and very supportive. SW to follow while baby remains in NICU.     Discharge instructions reviewed and all questions answered. Meds given to use at home. Discharged to bed and board at 1250.

## 2019-06-20 ENCOUNTER — TELEPHONE (OUTPATIENT)
Dept: OBGYN | Facility: CLINIC | Age: 29
End: 2019-06-20

## 2019-06-20 NOTE — TELEPHONE ENCOUNTER
MARU (Arnold) calling regarding at hospital grade breast pump.  Insurance form was faxed to [phone] on Mon that needs to be completed.  They would like to know if this was received.  Please call her Arnold [phone].  Ok to  on confidential VM.  Laura Velázquez RN

## 2019-06-21 NOTE — TELEPHONE ENCOUNTER
Form located in the [facility] office. Filled out by me. I will have Dr. Maldonado, on-call md, sign the form as Dr. Lewis is out of the office until Monday.  Randa SCHROEDER RN

## 2019-06-26 ENCOUNTER — PRENATAL OFFICE VISIT (OUTPATIENT)
Dept: OBGYN | Facility: CLINIC | Age: 29
End: 2019-06-26
Payer: COMMERCIAL

## 2019-06-26 VITALS — DIASTOLIC BLOOD PRESSURE: 62 MMHG | SYSTOLIC BLOOD PRESSURE: 106 MMHG | BODY MASS INDEX: 26.03 KG/M2 | WEIGHT: 156.4 LBS

## 2019-06-26 PROCEDURE — 99024 POSTOP FOLLOW-UP VISIT: CPT | Performed by: OBSTETRICS & GYNECOLOGY

## 2019-06-26 NOTE — PROGRESS NOTES
CC:  Here for post-op check-up.      HPI:  Procedure: Stat LTCS  Date of procedure: 19  Post-op course:  Uncomplicated thus far.  Normal bowel and bladder habits.  No vaginal bleeding or drainage.    OBJECTIVE:   /62 (BP Location: Right arm, Patient Position: Chair, Cuff Size: Adult Regular)   Wt 70.9 kg (156 lb 6.4 oz)   LMP 10/26/2018 (Exact Date)   BMI 26.03 kg/m    Abdomen:  ND, soft, NT  Incision:  Healing well with no erythema, induration, or drainage.  Steristrips removed.    ASSESSMENT:   Encounter Diagnosis   Name Primary?     Postpartum care following  delivery Yes         PLAN:      Pt. to f/u for routine postop check in 4 weeks.      Don Colorado MD  [facility]

## 2019-07-22 ENCOUNTER — PRENATAL OFFICE VISIT (OUTPATIENT)
Dept: OBGYN | Facility: CLINIC | Age: 29
End: 2019-07-22
Payer: COMMERCIAL

## 2019-07-22 VITALS
WEIGHT: 154.6 LBS | DIASTOLIC BLOOD PRESSURE: 64 MMHG | SYSTOLIC BLOOD PRESSURE: 100 MMHG | BODY MASS INDEX: 25.76 KG/M2 | HEIGHT: 65 IN

## 2019-07-22 DIAGNOSIS — Z12.4 PAP SMEAR FOR CERVICAL CANCER SCREENING: ICD-10-CM

## 2019-07-22 DIAGNOSIS — Z30.41 ENCOUNTER FOR SURVEILLANCE OF CONTRACEPTIVE PILLS: ICD-10-CM

## 2019-07-22 DIAGNOSIS — Z86.32 HISTORY OF GESTATIONAL DIABETES: Primary | ICD-10-CM

## 2019-07-22 PROCEDURE — G0145 SCR C/V CYTO,THINLAYER,RESCR: HCPCS | Performed by: FAMILY MEDICINE

## 2019-07-22 PROCEDURE — 99207 ZZC POST PARTUM EXAM: CPT | Performed by: FAMILY MEDICINE

## 2019-07-22 RX ORDER — NORGESTIMATE AND ETHINYL ESTRADIOL 0.25-0.035
1 KIT ORAL DAILY
Qty: 84 TABLET | Refills: 3 | Status: SHIPPED | OUTPATIENT
Start: 2019-07-22 | End: 2021-04-08

## 2019-07-22 ASSESSMENT — MIFFLIN-ST. JEOR: SCORE: 1432.14

## 2019-07-22 ASSESSMENT — PATIENT HEALTH QUESTIONNAIRE - PHQ9: SUM OF ALL RESPONSES TO PHQ QUESTIONS 1-9: 3

## 2019-07-22 NOTE — PROGRESS NOTES
SUBJECTIVE: Seth is here for a 6-week postpartum checkup.    Delivery date was 19. She had a c/s for obstetrical complications--placental abruption of a viable girl, weight 4 pounds 9 oz., with no complications.  Since delivery, she has been breast feeding--pumping and feeding with bottle.  She has no signs of infection, bleeding or other complications.  She is not pregnant.  We discussed contraceptions and she has chosen mini pill / progesterone only pill.  She  has not had intercourse since delivery and complains of No discomfort. Patient screened for postpartum depression and complaints are NEGATIVE. Screening has also been completed for intimate partner violence.    EXAM:  Today's Depression Rating was 3  GENERAL healthy, alert and no distress  EYES EOMI, intact visual fields, PERRL and funduscopic deferred  HENT: Normocephalic. TM's grossly normal, oropharynx without significant findings.  NECK: no adenopathy, no asymmetry, masses, or scars and thyroid normal to palpation  RESP: Clear to auscultation  BREASTS: NEGATIVE  CV: NEGATIVE  LYMPH: NEGATIVE  GI: aorta normal and bowel sounds normal  : no hernia detected  GYN PELVIC: NEGATIVE, normal external genitalia, normal groin lymphatics, normal urethral meatus, normal vaginal mucosa, normal cervix and normal adnexa, no masses or tenderness  MS: NEGATIVE, Extremities normal. No deformaties, edema or skin discoloration.  SKIN: no ulcers, lesions or rash  NEURO: alert/oriented to person, location and time, CN 2-12 intact, brisk, symmetric reflexes at knees and biceps b/l, strength 5/5 throughout and symmetric, sensation to light touch grossly intact throughout  PSYCH: NEGATIVE    Discussed risks and benefits of contraception options in detail including oral contraceptive pills, injection, IUD, ring and sterilization. Patient elects Oral Contraceptive.  No contraindications noted.           ASSESSMENT:   Normal postpartum exam after , c/s for maternal  complications abruptio @ 32 6/7 weeks EGA.  Oral Contraceptive rx given   PLAN:  Return as needed or at time of next expected pap, pelvic, or breast exam.

## 2019-07-25 LAB
COPATH REPORT: NORMAL
PAP: NORMAL

## 2019-07-30 LAB — HGB BLD-MCNC: 14.1 G/DL (ref 11.7–15.7)

## 2019-07-30 PROCEDURE — 36415 COLL VENOUS BLD VENIPUNCTURE: CPT | Performed by: FAMILY MEDICINE

## 2019-07-30 PROCEDURE — 85018 HEMOGLOBIN: CPT | Performed by: FAMILY MEDICINE

## 2019-09-09 ENCOUNTER — TELEPHONE (OUTPATIENT)
Dept: OBGYN | Facility: CLINIC | Age: 29
End: 2019-09-09

## 2019-09-09 NOTE — TELEPHONE ENCOUNTER
Reason for Call:  Form, our goal is to have forms completed with 72 hours, however, some forms may require a visit or additional information.    Type of letter, form or note:  medical    Who is the form from?: [facility]    Where did the form come from: form was faxed in    What clinic location was the form placed at?: [facility]    Where the form was placed: [facility]/Folder    What number is listed as a contact on the form?:        Additional comments:     Call taken on 9/9/2019 at 4:50 PM by Saritha Roche

## 2020-03-02 ENCOUNTER — HEALTH MAINTENANCE LETTER (OUTPATIENT)
Age: 30
End: 2020-03-02

## 2020-03-17 ENCOUNTER — VIRTUAL VISIT (OUTPATIENT)
Dept: FAMILY MEDICINE | Facility: OTHER | Age: 30
End: 2020-03-17

## 2020-03-22 ENCOUNTER — VIRTUAL VISIT (OUTPATIENT)
Dept: FAMILY MEDICINE | Facility: OTHER | Age: 30
End: 2020-03-22

## 2020-03-22 NOTE — PROGRESS NOTES
"Date: 2020 11:18:41  Clinician: Billie Cordon  Clinician NPI: [id number]  Patient: Seth Null  Patient : 1990  Patient Address: [address] [place] [address]  Patient Phone: [phone]  Visit Protocol: OLEGI  Patient Summary:  Seth is a 29 year old ( : 1990 ) female who initiated a Visit for COVID-19 (Coronavirus) evaluation and screening. When asked the question \"Please sign me up to receive news, health information and promotions. \", Seth responded \"No\".    Seth states her symptoms started gradually 3-6 days ago. After her symptoms started, they improved and then got worse again.   Her symptoms consist of a cough, nasal congestion, and malaise. She is experiencing mild difficulty breathing with activities but can speak normally in full sentences.   Symptom details     Nasal secretions: The color of her mucus is clear and yellow.    Cough: Seth coughs a few times an hour and her cough is not more bothersome at night. Phlegm comes into her throat when she coughs. She does not believe her cough is caused by post-nasal drip. The color of the phlegm is clear.      Seth denies having ear pain, rhinitis, enlarged lymph nodes, facial pain or pressure, myalgias, wheezing, sore throat, chills, teeth pain, headache, and fever. She also denies having a sinus infection within the past year, taking antibiotic medication for the symptoms, and having recent facial or sinus surgery in the past 60 days.   Precipitating events  She has not recently been exposed to someone with influenza. Seth has been in close contact with the following high risk individuals: children under the age of 5.   Pertinent COVID-19 (Coronavirus) information  Seth has not traveled internationally or to the areas where COVID-19 (Coronavirus) is widespread, including cruise ship travel in the last 14 days before the start of her symptoms.   Seth has not had a close contact with a laboratory-confirmed COVID-19 patient " within 14 days of symptom onset. She also has not had a close contact with a suspected COVID-19 patient within 14 days of symptom onset.   Seth is not a healthcare worker and does not work in a healthcare facility.   Pertinent medical history  Seth does not get yeast infections when she takes antibiotics.   Seth does not need a return to work/school note.   Weight: 157 lbs   Seth does not smoke or use smokeless tobacco.   She denies pregnancy and denies breastfeeding. She is currently menstruating.   Additional information as reported by the patient (free text): Kyara had this shortness of breath before and went in a couple years ago and they couldnt find anything wrong. Its just like I cant get enough air into my lungs. I just started zoloft again so Im wondering if the cause could too be anxiety with everything going on, but now have started coughing a little more and more tired. I just want to be safe.   Weight: 157 lbs  A synchronous phone visit was initiated by the provider for the following reason: further clarification of difficulty breathing    MEDICATIONS: No current medications, ALLERGIES: NKDA  Clinician Response:  Dear Seth,  I am sorry you are not feeling well. Additional information was needed to clarify some of the details provided during your Visit. Because I was unable to reach you, I would like you to be seen in person to further discuss your health history and symptoms so you get the most appropriate care for you.  You will not be charged for this Visit. Thank you for trusting us with your care.  COVID-19 (Coronavirus) General Information  With the increase in the number of COVID-19 (Coronavirus) cases, we understand you may have some questions. Below is some helpful information on COVID-19 (Coronavirus).  How can I protect myself and others from the COVID-19 (Coronavirus)?  Because there is currently no vaccine to prevent infection, the best way to protect yourself is to avoid being exposed to this  virus. Put distance between yourself and other people if COVID-19 (Coronavirus) is spreading in your community. The virus is thought to spread mainly from person-to-person.     Between people who are in close contact with one another (within about 6 about) for a prolonged period (10 minutes or longer).    Through respiratory droplets produced when an infected person coughs or sneezes.     The CDC recommends the following additional steps to protect yourself and others:     Wash your hands often with soap and water for at least 20 seconds, especially after blowing your nose, coughing, or sneezing; going to the bathroom; and before eating or preparing food.  Use an alcohol-based hand  that contains at least 60 percent alcohol if soap and water are not available.        Avoid touching your eyes, nose and mouth with unwashed hands.    Avoid close contact with people who are sick.    Stay home when you are sick.    Cover your cough or sneeze with a tissue, then throw the tissue in the trash.    Clean and disinfect frequently touched objects and surfaces.     You can help stop COVID-19 (Coronavirus) by knowing the signs and symptoms:     Fever    Cough    Shortness of breath     Contact your healthcare provider if   Develop symptoms   AND   Have been in close contact with a person known to have COVID-19 (Coronavirus) or live in or have recently traveled from an area with ongoing spread of COVID-19 (Coronavirus). Call ahead before you go to a doctor's office or emergency room. Tell them about your recent travel and your symptoms.   For the most up to date information, visit the CDC's website.  Self-monitoring  Self-monitoring means people should monitor themselves for fever by taking their temperatures twice a day and remain alert for a cough or difficulty breathing.  It is important to check your health two times each day for 14 days after a potential exposure to a person with COVID-19 (Coronavirus) or after  travel from a location where COVID-19 (Coronavirus) is widespread. If you have been exposed to a person with COVID-19 (Coronavirus), it may take up to 14 days to know if you will get sick. Follow the steps below to check and record your health.     Take your temperature with a thermometer twice a day, once in the morning and once in the evening, and watch for a cough or difficulty breathing for 14 days.    Write down your temperature and any COVID-19 symptoms you may have: feeling feverish, coughing, or difficulty breathing.    Stay home from work or school.    Do not take public transportation, taxis, or ride-shares.    Avoid crowded places (such as shopping centers and movie theaters) and limit your activities in public.    Keep your distance from others (about 6 feet or 2 meters).    If you get sick with fever, cough, or trouble breathing, contact your healthcare provider and tell them about your recent travel and/or your symptoms.    If you need to seek medical care for other reasons, such as dialysis, call ahead to your doctor and tell them about your recent travel.     Steps to help prevent the spread of COVID-19 (Coronavirus) if you are sick  If you are sick with COVID-19 (Coronavirus) or suspect you are infected with the virus that causes COVID-19 (Coronavirus), follow the steps below to help prevent the disease from spreading&nbsp;to people in your home and community.     Stay home except to get medical care. Home isolation may be started in consultation with your healthcare clinician.    Separate yourself from other people and animals in your home.    Call ahead before visiting your doctor if you have a medical appointment.    Wear a facemask when you are around other people.    Cover your cough and sneezes.    Clean your hands often.    Avoid sharing personal household items.    Clean and disinfect frequently touched objects and surfaces everyday.    You will need to have someone drop off medications or  "household supplies (if needed) at your house without coming inside or in contact with you or others living in your house.    Monitor your symptoms and seek prompt medical care if your illness is worsening (e.g. Difficulty breathing).    Discontinue home isolation only in consultation with your healthcare provider.     For more detailed and up to date information on what to do if you are sick, visit this link: What to Do If You Are Sick With Coronavirus Disease 2019 (COVID-19).  Do I need to be tested for COVID-19 (Coronavirus)?     At this time, the limited number of available tests are controlled by the state and local health departments and are being reserved for more seriously ill patients, those with known exposure to confirmed patients, and those with recent travel (within 14 days) to countries with high rates of COVID-19 (Coronavirus).    Decisions on which patients receive testing will be based on the local spread of COVID-19 (Coronavirus) as well as the symptoms. Your healthcare provider will make the final decision on whether you should be tested.    In the meantime, if you have concerns that you may have been exposed, it is reasonable to practice \"social distancing.\"&nbsp; If you are ill with a cold or flu-like illness, please monitor your symptoms and reach out to your healthcare provider if your symptoms worsen.    For more up to date information, visit this link: COVID-19 (Coronavirus) Frequently Asked Questions and Answers.      Diagnosis: Unable to contact patient  Diagnosis ICD: R69  Synchronous Triage: phone, status: incomplete, duration: 162 seconds  "

## 2020-03-31 ENCOUNTER — E-VISIT (OUTPATIENT)
Dept: FAMILY MEDICINE | Facility: CLINIC | Age: 30
End: 2020-03-31
Payer: COMMERCIAL

## 2020-03-31 DIAGNOSIS — F41.1 GENERALIZED ANXIETY DISORDER: ICD-10-CM

## 2020-03-31 PROCEDURE — 99207 ZZC NO BILLABLE SERVICE THIS VISIT: CPT | Performed by: FAMILY MEDICINE

## 2020-03-31 RX ORDER — SERTRALINE HYDROCHLORIDE 25 MG/1
25 TABLET, FILM COATED ORAL DAILY
Qty: 90 TABLET | Refills: 3 | Status: SHIPPED | OUTPATIENT
Start: 2020-03-31 | End: 2020-04-01

## 2020-03-31 ASSESSMENT — ANXIETY QUESTIONNAIRES
4. TROUBLE RELAXING: MORE THAN HALF THE DAYS
GAD7 TOTAL SCORE: 12
7. FEELING AFRAID AS IF SOMETHING AWFUL MIGHT HAPPEN: MORE THAN HALF THE DAYS
1. FEELING NERVOUS, ANXIOUS, OR ON EDGE: MORE THAN HALF THE DAYS
5. BEING SO RESTLESS THAT IT IS HARD TO SIT STILL: SEVERAL DAYS
7. FEELING AFRAID AS IF SOMETHING AWFUL MIGHT HAPPEN: MORE THAN HALF THE DAYS
2. NOT BEING ABLE TO STOP OR CONTROL WORRYING: NEARLY EVERY DAY
3. WORRYING TOO MUCH ABOUT DIFFERENT THINGS: MORE THAN HALF THE DAYS
6. BECOMING EASILY ANNOYED OR IRRITABLE: NOT AT ALL
GAD7 TOTAL SCORE: 12

## 2020-04-01 ENCOUNTER — MYC MEDICAL ADVICE (OUTPATIENT)
Dept: FAMILY MEDICINE | Facility: CLINIC | Age: 30
End: 2020-04-01

## 2020-04-01 DIAGNOSIS — F41.1 GENERALIZED ANXIETY DISORDER: Primary | ICD-10-CM

## 2020-04-01 RX ORDER — SERTRALINE HYDROCHLORIDE 100 MG/1
100 TABLET, FILM COATED ORAL DAILY
Qty: 90 TABLET | Refills: 3 | Status: SHIPPED | OUTPATIENT
Start: 2020-04-01 | End: 2021-04-08

## 2020-04-01 ASSESSMENT — ANXIETY QUESTIONNAIRES: GAD7 TOTAL SCORE: 12

## 2020-04-01 NOTE — TELEPHONE ENCOUNTER
Pt did not  the 25 mg RX - she is aware new dosing sent     Pharmacy notified to cancel RX of 25 mg     Amber Dunn RN

## 2020-04-01 NOTE — TELEPHONE ENCOUNTER
See my chart -  Pt has been taking 75 mg daily for at least the past 4 weeks    OK for increased dosing?    Amber Dunn, RN

## 2020-04-01 NOTE — TELEPHONE ENCOUNTER
Agree with dosage increase to 100 mg daily.     If she has filled 25 mg tablets already, she can take 4 to finish off the script. Will then take 1 of the 100 mg tablets daily going forward.     VERONIQUE

## 2020-07-12 DIAGNOSIS — Z11.59 SCREENING FOR VIRAL DISEASE: ICD-10-CM

## 2020-07-19 NOTE — MR AVS SNAPSHOT
After Visit Summary   2/20/2018    Seth Null    MRN: [id number]           Patient Information     Date Of Birth          1990        Visit Information        Provider Department      2/20/2018 5:00 PM Alejandro Smith PA-C [facility]        Today's Diagnoses     Acute chest pain    -  1      Care Instructions      Ibuprofen 800mg three times a day for 3-5 days  Follow up with primary this week  ED/911 with red flag symptoms    Pleurisy    If you have pleurisy, the lining around your lungs is inflamed. This is most often due to a viral infection or pneumonia. It usually lasts for 10 to 14 days. It may cause sharp pain with breathing, coughing, sneezing, and movement. Antibiotics are usually not prescribed for this condition unless pneumonia is also present.  The following tips will help you care for your condition at home:    If symptoms are severe, rest at home for the first 2 to 3 days. When you resume activity, don't let yourself get too tired.    Do not smoke. Also avoid being exposed to secondhand smoke.    You may use over-the-counter medicines to control pain, unless another pain medicine was prescribed. (Note: If you have chronic liver or kidney disease or have ever had a stomach ulcer or gastrointestinal bleeding, talk with your healthcare provider before using these medicines. Also talk to your provider if you are taking medicine to prevent blood clots.) Aspirin should never be given to anyone younger than 18 years of age who is ill with a viral infection or fever. It may cause severe liver or brain damage.  Follow-up care  Follow up with your healthcare provider, or as advised.  When to seek medical advice  Call your healthcare provider right away if any of these occur:    Fever over 100.4 F (38 C)    Coughing up lots of colored sputum (mucus)    Redness, pain, or swelling of the leg  Call 911, or get immediate medical care  Contact emergency services    Subjective:       Patient ID: Mei Corbin is a 63 y.o. female.    Chief Complaint:  Gynecologic Exam and Painful [place]      History of Present Illness  HPI  Annual Exam-Postmenopausal  Patient presents for annual exam. The patient is sexually active. GYN screening history: last pap: approximate date 3/2012 and was normal and last mammogram: was normal and pt states she is up to date w/ MMG done in [place].. The patient is not taking hormone replacement therapy. Patient denies post-menopausal vaginal bleeding. The patient wears seatbelts: yes. The patient participates in regular exercise: no. Has the patient ever been transfused or tattooed?: no. The patient reports that there is not domestic violence in her life.    Pt c/o very painful intercourse.  Pt states she has used lubricated vaginal dilators which she can accommodate the smaller ones but she cannot accommodate the largest or her partner.  Pt has tried lubricants w/ intercourse and are helpful but not adequate.     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    Pt has supracervical hysterectomy.                 GYN & OB History  No LMP recorded. Patient has had a hysterectomy.   Date of Last Pap: No result found    OB History   No obstetric history on file.       Review of Systems  Review of Systems   Constitutional: Negative.    Eyes: Negative.    Respiratory: Negative.    Cardiovascular: Negative for chest pain, palpitations and leg swelling.        2 prior PE's   Gastrointestinal: Negative.    Endocrine: Negative.    Genitourinary: Positive for dyspareunia. Negative for bladder incontinence, decreased libido, dysmenorrhea, dysuria, flank pain, frequency, genital sores, hematuria, hot flashes, menorrhagia, menstrual problem, pelvic pain, urgency, vaginal bleeding, vaginal discharge, vaginal pain, urinary incontinence, postcoital bleeding, postmenopausal bleeding, vaginal dryness and vaginal odor.    Musculoskeletal: Negative.    Hematological: Negative.    Psychiatric/Behavioral: Negative.    Breast: negative.            Objective:    Physical Exam:   Constitutional: She is oriented to person, place, and time. She appears well-developed and well-nourished. No distress.    HENT:   Head: Normocephalic and atraumatic.     Neck: Normal range of motion. No thyromegaly present.    Cardiovascular: Normal rate.     Pulmonary/Chest: Effort normal. No respiratory distress. Right breast exhibits no inverted nipple, no mass, no nipple discharge, no skin change, no tenderness, presence, no bleeding and no swelling. Left breast exhibits no inverted nipple, no mass, no nipple discharge, no skin change, no tenderness, presence, no bleeding and no swelling. Breasts are symmetrical.        Abdominal: Soft. She exhibits no distension and no mass. There is no abdominal tenderness. There is no rebound and no guarding.     Genitourinary:    Right adnexa and left adnexa normal.      Pelvic exam was performed with patient supine.   There is no rash, tenderness, lesion or injury on the right labia. There is no rash, tenderness, lesion or injury on the left labia. Uterus is absent. Cervix is normal. There is a normal right adnexa and a normal left adnexa. Right adnexum displays no mass, no tenderness and no fullness. Left adnexum displays no mass, no tenderness and no fullness. No erythema, tenderness, bleeding, rectocele, cystocele or unspecified prolapse of vaginal walls in the vagina.    No foreign body in the vagina.      No signs of injury in the vagina.   Labial bartholins normal.Cervix exhibits no motion tenderness, no discharge and no friability. Additional cervical findings: pap smear done   Genitourinary Comments: Vaginal mucosa noted to be atrophic   negative for vaginal discharge          Musculoskeletal: Normal range of motion. No tenderness.       Neurological: She is alert and oriented to person, place, and time. No  "right away if any of these occur:    Increasing shortness of breath    Increasing chest pain, or pain that spreads to the neck, arm, or back    Coughing up blood  Date Last Reviewed: 9/13/2015 2000-2017 [employer]. [address] [place] [address], [place] [address]. All rights reserved. This information is not intended as a substitute for professional medical care. Always follow your healthcare professional's instructions.                Follow-ups after your visit        Who to contact     If you have questions or need follow up information about today's clinic visit or your schedule please contact [facility] directly at [phone].  Normal or non-critical lab and imaging results will be communicated to you by MyChart, letter or phone within 4 business days after the clinic has received the results. If you do not hear from us within 7 days, please contact the clinic through [employer] or phone. If you have a critical or abnormal lab result, we will notify you by phone as soon as possible.  Submit refill requests through [employer] or call your pharmacy and they will forward the refill request to us. Please allow 3 business days for your refill to be completed.          Additional Information About Your Visit        [employer] Information     [employer] gives you secure access to your electronic health record. If you see a primary care provider, you can also send messages to your care team and make appointments. If you have questions, please call your primary care clinic.  If you do not have a primary care provider, please call [phone] and they will assist you.        Care EveryWhere ID     This is your Care EveryWhere ID. This could be used by other organizations to access your [facility] medical records  [id number]        Your Vitals Were     Pulse Temperature Height Last Period Pulse Oximetry Breastfeeding?    84 97.1  F (36.2  C) (Oral) 5' 5.25\" (1.657 m) 02/14/2018 (Exact Date) 99% No    " BMI (Body Mass Index)                   23.61 kg/m2            Blood Pressure from Last 3 Encounters:   02/20/18 124/78   02/16/18 110/76   02/15/18 119/81    Weight from Last 3 Encounters:   02/20/18 143 lb (64.9 kg)   02/16/18 145 lb (65.8 kg)   02/02/18 145 lb (65.8 kg)               Primary Care Provider Office Phone # Fax #    Natasha Lamonte Koch [phone] [id number] [facility] [id number]        Equal Access to Services     [facility] Hospital[facility] : Hadii melinda marx hadasho Soomaali, waaxda luqadaha, qaybta kaalmada adehemanthyase, jordyn torre . So [facility] [phone].    ATENCIÓN: Si habla español, tiene a sotelo disposición servicios gratuitos de asistencia lingüística. [facility] [phone].    We comply with applicable federal civil rights laws and [place] laws. We do not discriminate on the basis of race, color, national origin, age, disability, sex, sexual orientation, or gender identity.            Thank you!     Thank you for choosing [facility]  for your care. Our goal is always to provide you with excellent care. Hearing back from our patients is one way we can continue to improve our services. Please take a few minutes to complete the written survey that you may receive in the mail after your visit with us. Thank you!             Your Updated Medication List - Protect others around you: Learn how to safely use, store and throw away your medicines at www.disposemymeds.org.          This list is accurate as of 2/20/18  5:54 PM.  Always use your most recent med list.                   Brand Name Dispense Instructions for use Diagnosis    norgestimate-ethinyl estradiol 0.25-35 MG-MCG per tablet    ORTHO-CYCLEN, SPRINTEC    84 tablet    Take 1 tablet by mouth daily    Encounter for surveillance of contraceptive pills          cranial nerve deficit. Coordination normal.    Skin: She is not diaphoretic.    Psychiatric: She has a normal mood and affect. Her behavior is normal.          Assessment:        1. Atrophic vaginitis    2. Dyspareunia in female    3. Gynecologic exam normal    4. S/P laparoscopic supracervical hysterectomy               Plan:      1.  Discussed pt is not candidate for traditional HRT; however, studies demonstrate there is very little systemically absorbed estrogen on vaginal estrogen administration.  Discussed at length risks vs benefits.  Pt is not candidate for Osphena due to PE hx.  Pt is willing to try vaginal E2 and Rx for vagimfem given for which pt is only to use weekly rather than twice weekly.  2.  Pap and HR HPV collected  3.  Re-eval sx's in 2 months

## 2020-09-08 LAB
HBV SURFACE AG SERPL QL IA: NEGATIVE
HIV 1+2 AB+HIV1 P24 AG SERPL QL IA: NON REACTIVE
RUBELLA ABY IGG: NORMAL
RUBELLA ANTIBODY IGG QUANTITATIVE: 5.9 IU/ML
TREPONEMA ANTIBODIES: NON REACTIVE

## 2020-11-21 DIAGNOSIS — Z11.59 ENCOUNTER FOR SCREENING FOR OTHER VIRAL DISEASES: Primary | ICD-10-CM

## 2020-12-20 ENCOUNTER — HEALTH MAINTENANCE LETTER (OUTPATIENT)
Age: 30
End: 2020-12-20

## 2021-03-24 LAB — GROUP B STREP PCR: NEGATIVE

## 2021-04-08 ASSESSMENT — MIFFLIN-ST. JEOR: SCORE: 1578.18

## 2021-04-09 NOTE — PHARMACY-ADMISSION MEDICATION HISTORY
Medication reconciliation completed by pre-admitting.    Prior to Admission medications    Medication Sig Last Dose Taking? Auth Provider   insulin  UNIT/ML injection Inject 8 Units Subcutaneous At Bedtime  Yes Reported, Patient   Prenatal Vit-Fe Fumarate-FA (PRENATAL MULTIVITAMIN W/IRON) 27-0.8 MG tablet Take 1 tablet by mouth daily  Yes Reported, Patient

## 2021-04-10 ENCOUNTER — HOSPITAL ENCOUNTER (OUTPATIENT)
Dept: LAB | Facility: CLINIC | Age: 31
Discharge: HOME | End: 2021-04-10
Attending: OBSTETRICS & GYNECOLOGY | Admitting: OBSTETRICS & GYNECOLOGY
Payer: COMMERCIAL

## 2021-04-10 DIAGNOSIS — Z11.59 ENCOUNTER FOR SCREENING FOR OTHER VIRAL DISEASES: ICD-10-CM

## 2021-04-10 LAB
SARS-COV-2 RNA RESP QL NAA+PROBE: NORMAL
SPECIMEN SOURCE: NORMAL

## 2021-04-10 PROCEDURE — U0003 INFECTIOUS AGENT DETECTION BY NUCLEIC ACID (DNA OR RNA); SEVERE ACUTE RESPIRATORY SYNDROME CORONAVIRUS 2 (SARS-COV-2) (CORONAVIRUS DISEASE [COVID-19]), AMPLIFIED PROBE TECHNIQUE, MAKING USE OF HIGH THROUGHPUT TECHNOLOGIES AS DESCRIBED BY CMS-2020-01-R: HCPCS | Performed by: OBSTETRICS & GYNECOLOGY

## 2021-04-10 PROCEDURE — U0005 INFEC AGEN DETEC AMPLI PROBE: HCPCS | Performed by: OBSTETRICS & GYNECOLOGY

## 2021-04-11 LAB
LABORATORY COMMENT REPORT: NORMAL
SARS-COV-2 RNA RESP QL NAA+PROBE: NEGATIVE
SPECIMEN SOURCE: NORMAL

## 2021-04-12 ENCOUNTER — HOSPITAL ENCOUNTER (OUTPATIENT)
Dept: LAB | Facility: CLINIC | Age: 31
Discharge: HOME | End: 2021-04-12
Attending: OBSTETRICS & GYNECOLOGY | Admitting: OBSTETRICS & GYNECOLOGY
Payer: COMMERCIAL

## 2021-04-12 DIAGNOSIS — Z01.812 PRE-OPERATIVE LABORATORY EXAMINATION: ICD-10-CM

## 2021-04-12 LAB
ABO + RH BLD: ABNORMAL
ABO + RH BLD: ABNORMAL
BLD GP AB INVEST PLASRBC-IMP: ABNORMAL
BLD GP AB SCN SERPL QL: ABNORMAL
BLOOD BANK CMNT PATIENT-IMP: ABNORMAL
HGB BLD-MCNC: 15.5 G/DL (ref 11.7–15.7)
SPECIMEN EXP DATE BLD: ABNORMAL
T PALLIDUM AB SER QL: NONREACTIVE

## 2021-04-12 PROCEDURE — 86780 TREPONEMA PALLIDUM: CPT | Performed by: OBSTETRICS & GYNECOLOGY

## 2021-04-12 PROCEDURE — 36415 COLL VENOUS BLD VENIPUNCTURE: CPT | Performed by: OBSTETRICS & GYNECOLOGY

## 2021-04-12 PROCEDURE — 86900 BLOOD TYPING SEROLOGIC ABO: CPT | Performed by: OBSTETRICS & GYNECOLOGY

## 2021-04-12 PROCEDURE — 85018 HEMOGLOBIN: CPT | Performed by: OBSTETRICS & GYNECOLOGY

## 2021-04-12 PROCEDURE — 86901 BLOOD TYPING SEROLOGIC RH(D): CPT | Performed by: OBSTETRICS & GYNECOLOGY

## 2021-04-12 PROCEDURE — 86870 RBC ANTIBODY IDENTIFICATION: CPT | Performed by: OBSTETRICS & GYNECOLOGY

## 2021-04-12 PROCEDURE — 86850 RBC ANTIBODY SCREEN: CPT | Performed by: OBSTETRICS & GYNECOLOGY

## 2021-04-13 ENCOUNTER — ANESTHESIA EVENT (OUTPATIENT)
Dept: OBGYN | Facility: CLINIC | Age: 31
End: 2021-04-13
Payer: COMMERCIAL

## 2021-04-13 ENCOUNTER — ANESTHESIA (OUTPATIENT)
Dept: OBGYN | Facility: CLINIC | Age: 31
End: 2021-04-13
Payer: COMMERCIAL

## 2021-04-13 ENCOUNTER — HOSPITAL ENCOUNTER (INPATIENT)
Facility: CLINIC | Age: 31
LOS: 2 days | Discharge: HOME | End: 2021-04-15
Attending: OBSTETRICS & GYNECOLOGY | Admitting: OBSTETRICS & GYNECOLOGY
Payer: COMMERCIAL

## 2021-04-13 DIAGNOSIS — Z98.891 S/P C-SECTION: Primary | ICD-10-CM

## 2021-04-13 LAB
BLOOD BANK CMNT PATIENT-IMP: NORMAL
BLOOD BANK CMNT PATIENT-IMP: NORMAL
GLUCOSE BLDC GLUCOMTR-MCNC: 109 MG/DL (ref 70–99)
GLUCOSE BLDC GLUCOMTR-MCNC: 133 MG/DL (ref 70–99)
GLUCOSE BLDC GLUCOMTR-MCNC: 149 MG/DL (ref 70–99)
GLUCOSE BLDC GLUCOMTR-MCNC: 87 MG/DL (ref 70–99)

## 2021-04-13 PROCEDURE — 258N000003 HC RX IP 258 OP 636: Performed by: OBSTETRICS & GYNECOLOGY

## 2021-04-13 PROCEDURE — 250N000009 HC RX 250: Performed by: NURSE ANESTHETIST, CERTIFIED REGISTERED

## 2021-04-13 PROCEDURE — 120N000001 HC R&B MED SURG/OB

## 2021-04-13 PROCEDURE — 250N000011 HC RX IP 250 OP 636: Performed by: NURSE ANESTHETIST, CERTIFIED REGISTERED

## 2021-04-13 PROCEDURE — 250N000011 HC RX IP 250 OP 636: Performed by: OBSTETRICS & GYNECOLOGY

## 2021-04-13 PROCEDURE — 360N000076 HC SURGERY LEVEL 3, PER MIN: Performed by: OBSTETRICS & GYNECOLOGY

## 2021-04-13 PROCEDURE — 370N000017 HC ANESTHESIA TECHNICAL FEE, PER MIN: Performed by: OBSTETRICS & GYNECOLOGY

## 2021-04-13 PROCEDURE — 250N000013 HC RX MED GY IP 250 OP 250 PS 637: Performed by: OBSTETRICS & GYNECOLOGY

## 2021-04-13 PROCEDURE — 272N000001 HC OR GENERAL SUPPLY STERILE: Performed by: OBSTETRICS & GYNECOLOGY

## 2021-04-13 PROCEDURE — 250N000011 HC RX IP 250 OP 636

## 2021-04-13 PROCEDURE — 710N000009 HC RECOVERY PHASE 1, LEVEL 1, PER MIN: Performed by: OBSTETRICS & GYNECOLOGY

## 2021-04-13 PROCEDURE — 250N000011 HC RX IP 250 OP 636: Performed by: ANESTHESIOLOGY

## 2021-04-13 PROCEDURE — 250N000009 HC RX 250

## 2021-04-13 PROCEDURE — 86900 BLOOD TYPING SEROLOGIC ABO: CPT | Performed by: OBSTETRICS & GYNECOLOGY

## 2021-04-13 PROCEDURE — 258N000003 HC RX IP 258 OP 636: Performed by: NURSE ANESTHETIST, CERTIFIED REGISTERED

## 2021-04-13 PROCEDURE — 999N001017 HC STATISTIC GLUCOSE BY METER IP

## 2021-04-13 RX ORDER — MEPERIDINE HYDROCHLORIDE 25 MG/ML
12.5 INJECTION INTRAMUSCULAR; INTRAVENOUS; SUBCUTANEOUS EVERY 5 MIN PRN
Status: DISCONTINUED | OUTPATIENT
Start: 2021-04-13 | End: 2021-04-13 | Stop reason: HOSPADM

## 2021-04-13 RX ORDER — KETOROLAC TROMETHAMINE 30 MG/ML
INJECTION, SOLUTION INTRAMUSCULAR; INTRAVENOUS PRN
Status: DISCONTINUED | OUTPATIENT
Start: 2021-04-13 | End: 2021-04-13

## 2021-04-13 RX ORDER — OXYTOCIN 10 [USP'U]/ML
10 INJECTION, SOLUTION INTRAMUSCULAR; INTRAVENOUS
Status: DISCONTINUED | OUTPATIENT
Start: 2021-04-13 | End: 2021-04-15 | Stop reason: HOSPADM

## 2021-04-13 RX ORDER — HYDRALAZINE HYDROCHLORIDE 20 MG/ML
2.5-5 INJECTION INTRAMUSCULAR; INTRAVENOUS EVERY 10 MIN PRN
Status: DISCONTINUED | OUTPATIENT
Start: 2021-04-13 | End: 2021-04-13 | Stop reason: HOSPADM

## 2021-04-13 RX ORDER — PRENATAL VIT/IRON FUM/FOLIC AC 27MG-0.8MG
1 TABLET ORAL DAILY
Status: DISCONTINUED | OUTPATIENT
Start: 2021-04-13 | End: 2021-04-15 | Stop reason: HOSPADM

## 2021-04-13 RX ORDER — FENTANYL CITRATE 50 UG/ML
25-50 INJECTION, SOLUTION INTRAMUSCULAR; INTRAVENOUS
Status: DISCONTINUED | OUTPATIENT
Start: 2021-04-13 | End: 2021-04-13 | Stop reason: HOSPADM

## 2021-04-13 RX ORDER — NALOXONE HYDROCHLORIDE 0.4 MG/ML
0.2 INJECTION, SOLUTION INTRAMUSCULAR; INTRAVENOUS; SUBCUTANEOUS
Status: DISCONTINUED | OUTPATIENT
Start: 2021-04-13 | End: 2021-04-14

## 2021-04-13 RX ORDER — HYDROCORTISONE 2.5 %
CREAM (GRAM) TOPICAL 3 TIMES DAILY PRN
Status: DISCONTINUED | OUTPATIENT
Start: 2021-04-13 | End: 2021-04-15 | Stop reason: HOSPADM

## 2021-04-13 RX ORDER — CEFAZOLIN SODIUM 1 G/3ML
1 INJECTION, POWDER, FOR SOLUTION INTRAMUSCULAR; INTRAVENOUS SEE ADMIN INSTRUCTIONS
Status: DISCONTINUED | OUTPATIENT
Start: 2021-04-13 | End: 2021-04-13 | Stop reason: HOSPADM

## 2021-04-13 RX ORDER — NALOXONE HYDROCHLORIDE 0.4 MG/ML
0.4 INJECTION, SOLUTION INTRAMUSCULAR; INTRAVENOUS; SUBCUTANEOUS
Status: DISCONTINUED | OUTPATIENT
Start: 2021-04-13 | End: 2021-04-14

## 2021-04-13 RX ORDER — BISACODYL 10 MG
10 SUPPOSITORY, RECTAL RECTAL DAILY PRN
Status: DISCONTINUED | OUTPATIENT
Start: 2021-04-15 | End: 2021-04-15 | Stop reason: HOSPADM

## 2021-04-13 RX ORDER — TRANEXAMIC ACID 10 MG/ML
1 INJECTION, SOLUTION INTRAVENOUS EVERY 30 MIN PRN
Status: DISCONTINUED | OUTPATIENT
Start: 2021-04-13 | End: 2021-04-15 | Stop reason: HOSPADM

## 2021-04-13 RX ORDER — DEXAMETHASONE SODIUM PHOSPHATE 4 MG/ML
INJECTION, SOLUTION INTRA-ARTICULAR; INTRALESIONAL; INTRAMUSCULAR; INTRAVENOUS; SOFT TISSUE PRN
Status: DISCONTINUED | OUTPATIENT
Start: 2021-04-13 | End: 2021-04-13

## 2021-04-13 RX ORDER — ONDANSETRON 2 MG/ML
4 INJECTION INTRAMUSCULAR; INTRAVENOUS EVERY 30 MIN PRN
Status: DISCONTINUED | OUTPATIENT
Start: 2021-04-13 | End: 2021-04-13 | Stop reason: HOSPADM

## 2021-04-13 RX ORDER — LIDOCAINE 40 MG/G
CREAM TOPICAL
Status: DISCONTINUED | OUTPATIENT
Start: 2021-04-13 | End: 2021-04-14

## 2021-04-13 RX ORDER — AMOXICILLIN 250 MG
1 CAPSULE ORAL 2 TIMES DAILY
Status: DISCONTINUED | OUTPATIENT
Start: 2021-04-13 | End: 2021-04-15 | Stop reason: HOSPADM

## 2021-04-13 RX ORDER — BUPIVACAINE HYDROCHLORIDE 7.5 MG/ML
INJECTION, SOLUTION INTRASPINAL PRN
Status: DISCONTINUED | OUTPATIENT
Start: 2021-04-13 | End: 2021-04-13

## 2021-04-13 RX ORDER — FENTANYL CITRATE 50 UG/ML
INJECTION, SOLUTION INTRAMUSCULAR; INTRAVENOUS
Status: COMPLETED
Start: 2021-04-13 | End: 2021-04-13

## 2021-04-13 RX ORDER — SODIUM CHLORIDE, SODIUM LACTATE, POTASSIUM CHLORIDE, CALCIUM CHLORIDE 600; 310; 30; 20 MG/100ML; MG/100ML; MG/100ML; MG/100ML
INJECTION, SOLUTION INTRAVENOUS CONTINUOUS
Status: DISCONTINUED | OUTPATIENT
Start: 2021-04-13 | End: 2021-04-13 | Stop reason: HOSPADM

## 2021-04-13 RX ORDER — OXYTOCIN/0.9 % SODIUM CHLORIDE 30/500 ML
340 PLASTIC BAG, INJECTION (ML) INTRAVENOUS CONTINUOUS PRN
Status: DISCONTINUED | OUTPATIENT
Start: 2021-04-13 | End: 2021-04-15 | Stop reason: HOSPADM

## 2021-04-13 RX ORDER — SIMETHICONE 80 MG
80 TABLET,CHEWABLE ORAL 4 TIMES DAILY PRN
Status: DISCONTINUED | OUTPATIENT
Start: 2021-04-13 | End: 2021-04-15 | Stop reason: HOSPADM

## 2021-04-13 RX ORDER — LIDOCAINE 40 MG/G
CREAM TOPICAL
Status: DISCONTINUED | OUTPATIENT
Start: 2021-04-13 | End: 2021-04-13 | Stop reason: HOSPADM

## 2021-04-13 RX ORDER — ONDANSETRON 4 MG/1
4 TABLET, ORALLY DISINTEGRATING ORAL EVERY 30 MIN PRN
Status: DISCONTINUED | OUTPATIENT
Start: 2021-04-13 | End: 2021-04-13 | Stop reason: HOSPADM

## 2021-04-13 RX ORDER — AMOXICILLIN 250 MG
2 CAPSULE ORAL 2 TIMES DAILY
Status: DISCONTINUED | OUTPATIENT
Start: 2021-04-13 | End: 2021-04-15 | Stop reason: HOSPADM

## 2021-04-13 RX ORDER — ONDANSETRON 4 MG/1
4 TABLET, ORALLY DISINTEGRATING ORAL EVERY 6 HOURS PRN
Status: DISCONTINUED | OUTPATIENT
Start: 2021-04-13 | End: 2021-04-14

## 2021-04-13 RX ORDER — OXYTOCIN/0.9 % SODIUM CHLORIDE 30/500 ML
PLASTIC BAG, INJECTION (ML) INTRAVENOUS
Status: COMPLETED
Start: 2021-04-13 | End: 2021-04-13

## 2021-04-13 RX ORDER — MORPHINE SULFATE 1 MG/ML
INJECTION, SOLUTION EPIDURAL; INTRATHECAL; INTRAVENOUS PRN
Status: DISCONTINUED | OUTPATIENT
Start: 2021-04-13 | End: 2021-04-13

## 2021-04-13 RX ORDER — ONDANSETRON 2 MG/ML
INJECTION INTRAMUSCULAR; INTRAVENOUS PRN
Status: DISCONTINUED | OUTPATIENT
Start: 2021-04-13 | End: 2021-04-13

## 2021-04-13 RX ORDER — KETOROLAC TROMETHAMINE 30 MG/ML
30 INJECTION, SOLUTION INTRAMUSCULAR; INTRAVENOUS
Status: DISCONTINUED | OUTPATIENT
Start: 2021-04-13 | End: 2021-04-13 | Stop reason: HOSPADM

## 2021-04-13 RX ORDER — IBUPROFEN 800 MG/1
800 TABLET, FILM COATED ORAL EVERY 6 HOURS
Status: DISCONTINUED | OUTPATIENT
Start: 2021-04-14 | End: 2021-04-15 | Stop reason: HOSPADM

## 2021-04-13 RX ORDER — LIDOCAINE 40 MG/G
CREAM TOPICAL
Status: DISCONTINUED | OUTPATIENT
Start: 2021-04-13 | End: 2021-04-15 | Stop reason: HOSPADM

## 2021-04-13 RX ORDER — DEXTROSE MONOHYDRATE 25 G/50ML
25-50 INJECTION, SOLUTION INTRAVENOUS
Status: DISCONTINUED | OUTPATIENT
Start: 2021-04-13 | End: 2021-04-15 | Stop reason: HOSPADM

## 2021-04-13 RX ORDER — KETOROLAC TROMETHAMINE 30 MG/ML
30 INJECTION, SOLUTION INTRAMUSCULAR; INTRAVENOUS EVERY 6 HOURS
Status: COMPLETED | OUTPATIENT
Start: 2021-04-13 | End: 2021-04-14

## 2021-04-13 RX ORDER — DEXTROSE, SODIUM CHLORIDE, SODIUM LACTATE, POTASSIUM CHLORIDE, AND CALCIUM CHLORIDE 5; .6; .31; .03; .02 G/100ML; G/100ML; G/100ML; G/100ML; G/100ML
INJECTION, SOLUTION INTRAVENOUS CONTINUOUS
Status: DISCONTINUED | OUTPATIENT
Start: 2021-04-13 | End: 2021-04-14

## 2021-04-13 RX ORDER — ACETAMINOPHEN 325 MG/1
975 TABLET ORAL EVERY 6 HOURS
Status: DISCONTINUED | OUTPATIENT
Start: 2021-04-13 | End: 2021-04-15 | Stop reason: HOSPADM

## 2021-04-13 RX ORDER — CEFAZOLIN SODIUM 2 G/100ML
2 INJECTION, SOLUTION INTRAVENOUS
Status: COMPLETED | OUTPATIENT
Start: 2021-04-13 | End: 2021-04-13

## 2021-04-13 RX ORDER — NICOTINE POLACRILEX 4 MG
15-30 LOZENGE BUCCAL
Status: DISCONTINUED | OUTPATIENT
Start: 2021-04-13 | End: 2021-04-15 | Stop reason: HOSPADM

## 2021-04-13 RX ORDER — MODIFIED LANOLIN
OINTMENT (GRAM) TOPICAL
Status: DISCONTINUED | OUTPATIENT
Start: 2021-04-13 | End: 2021-04-15 | Stop reason: HOSPADM

## 2021-04-13 RX ORDER — OXYCODONE HYDROCHLORIDE 5 MG/1
5 TABLET ORAL EVERY 4 HOURS PRN
Status: DISCONTINUED | OUTPATIENT
Start: 2021-04-13 | End: 2021-04-15 | Stop reason: HOSPADM

## 2021-04-13 RX ORDER — CITRIC ACID/SODIUM CITRATE 334-500MG
30 SOLUTION, ORAL ORAL
Status: COMPLETED | OUTPATIENT
Start: 2021-04-13 | End: 2021-04-13

## 2021-04-13 RX ORDER — OXYTOCIN/0.9 % SODIUM CHLORIDE 30/500 ML
100 PLASTIC BAG, INJECTION (ML) INTRAVENOUS CONTINUOUS
Status: DISCONTINUED | OUTPATIENT
Start: 2021-04-13 | End: 2021-04-14

## 2021-04-13 RX ORDER — ONDANSETRON 2 MG/ML
4 INJECTION INTRAMUSCULAR; INTRAVENOUS EVERY 6 HOURS PRN
Status: DISCONTINUED | OUTPATIENT
Start: 2021-04-13 | End: 2021-04-14

## 2021-04-13 RX ORDER — LABETALOL HYDROCHLORIDE 5 MG/ML
10 INJECTION, SOLUTION INTRAVENOUS
Status: DISCONTINUED | OUTPATIENT
Start: 2021-04-13 | End: 2021-04-13 | Stop reason: HOSPADM

## 2021-04-13 RX ORDER — EPHEDRINE SULFATE 50 MG/ML
5 INJECTION, SOLUTION INTRAMUSCULAR; INTRAVENOUS; SUBCUTANEOUS
Status: DISCONTINUED | OUTPATIENT
Start: 2021-04-13 | End: 2021-04-14

## 2021-04-13 RX ORDER — OXYTOCIN/0.9 % SODIUM CHLORIDE 30/500 ML
PLASTIC BAG, INJECTION (ML) INTRAVENOUS PRN
Status: DISCONTINUED | OUTPATIENT
Start: 2021-04-13 | End: 2021-04-13

## 2021-04-13 RX ORDER — NALBUPHINE HYDROCHLORIDE 10 MG/ML
2.5-5 INJECTION, SOLUTION INTRAMUSCULAR; INTRAVENOUS; SUBCUTANEOUS EVERY 6 HOURS PRN
Status: DISCONTINUED | OUTPATIENT
Start: 2021-04-13 | End: 2021-04-14

## 2021-04-13 RX ORDER — HYDROMORPHONE HYDROCHLORIDE 1 MG/ML
.3-.5 INJECTION, SOLUTION INTRAMUSCULAR; INTRAVENOUS; SUBCUTANEOUS EVERY 5 MIN PRN
Status: DISCONTINUED | OUTPATIENT
Start: 2021-04-13 | End: 2021-04-13 | Stop reason: HOSPADM

## 2021-04-13 RX ORDER — ONDANSETRON 2 MG/ML
4 INJECTION INTRAMUSCULAR; INTRAVENOUS EVERY 6 HOURS PRN
Status: DISCONTINUED | OUTPATIENT
Start: 2021-04-13 | End: 2021-04-15 | Stop reason: HOSPADM

## 2021-04-13 RX ADMIN — SODIUM CITRATE AND CITRIC ACID MONOHYDRATE 30 ML: 500; 334 SOLUTION ORAL at 06:37

## 2021-04-13 RX ADMIN — CEFAZOLIN SODIUM 2 G: 2 INJECTION, SOLUTION INTRAVENOUS at 07:29

## 2021-04-13 RX ADMIN — KETOROLAC TROMETHAMINE 30 MG: 30 INJECTION, SOLUTION INTRAMUSCULAR at 08:06

## 2021-04-13 RX ADMIN — Medication 100 ML/HR: at 11:25

## 2021-04-13 RX ADMIN — SODIUM CHLORIDE, SODIUM LACTATE, POTASSIUM CHLORIDE, CALCIUM CHLORIDE AND DEXTROSE MONOHYDRATE: 5; 600; 310; 30; 20 INJECTION, SOLUTION INTRAVENOUS at 16:24

## 2021-04-13 RX ADMIN — FENTANYL CITRATE 50 MCG: 50 INJECTION, SOLUTION INTRAMUSCULAR; INTRAVENOUS at 09:09

## 2021-04-13 RX ADMIN — SODIUM CHLORIDE, POTASSIUM CHLORIDE, SODIUM LACTATE AND CALCIUM CHLORIDE: 600; 310; 30; 20 INJECTION, SOLUTION INTRAVENOUS at 07:47

## 2021-04-13 RX ADMIN — DEXAMETHASONE SODIUM PHOSPHATE 4 MG: 4 INJECTION, SOLUTION INTRA-ARTICULAR; INTRALESIONAL; INTRAMUSCULAR; INTRAVENOUS; SOFT TISSUE at 07:29

## 2021-04-13 RX ADMIN — OXYTOCIN-SODIUM CHLORIDE 0.9% IV SOLN 30 UNIT/500ML 200 ML: 30-0.9/5 SOLUTION at 08:21

## 2021-04-13 RX ADMIN — HYDROMORPHONE HYDROCHLORIDE 0.5 MG: 1 INJECTION, SOLUTION INTRAMUSCULAR; INTRAVENOUS; SUBCUTANEOUS at 10:26

## 2021-04-13 RX ADMIN — ONDANSETRON 4 MG: 2 INJECTION INTRAMUSCULAR; INTRAVENOUS at 07:29

## 2021-04-13 RX ADMIN — KETOROLAC TROMETHAMINE 30 MG: 30 INJECTION, SOLUTION INTRAMUSCULAR at 15:03

## 2021-04-13 RX ADMIN — ACETAMINOPHEN 975 MG: 325 TABLET, FILM COATED ORAL at 18:27

## 2021-04-13 RX ADMIN — Medication 0.2 MG: at 07:32

## 2021-04-13 RX ADMIN — KETOROLAC TROMETHAMINE 30 MG: 30 INJECTION, SOLUTION INTRAMUSCULAR at 20:53

## 2021-04-13 RX ADMIN — BUPIVACAINE HYDROCHLORIDE IN DEXTROSE 13 MG: 7.5 INJECTION, SOLUTION SUBARACHNOID at 07:32

## 2021-04-13 RX ADMIN — ACETAMINOPHEN 975 MG: 325 TABLET, FILM COATED ORAL at 12:23

## 2021-04-13 RX ADMIN — SODIUM CHLORIDE, POTASSIUM CHLORIDE, SODIUM LACTATE AND CALCIUM CHLORIDE: 600; 310; 30; 20 INJECTION, SOLUTION INTRAVENOUS at 06:06

## 2021-04-13 RX ADMIN — PHENYLEPHRINE HYDROCHLORIDE 0.3 MCG/KG/MIN: 10 INJECTION INTRAVENOUS at 07:34

## 2021-04-13 RX ADMIN — DOCUSATE SODIUM 50 MG AND SENNOSIDES 8.6 MG 1 TABLET: 8.6; 5 TABLET, FILM COATED ORAL at 20:53

## 2021-04-13 ASSESSMENT — ACTIVITIES OF DAILY LIVING (ADL)
FALL_HISTORY_WITHIN_LAST_SIX_MONTHS: NO
WEAR_GLASSES_OR_BLIND: YES
TOILETING_ISSUES: NO

## 2021-04-13 ASSESSMENT — MIFFLIN-ST. JEOR: SCORE: 1573.64

## 2021-04-13 NOTE — ANESTHESIA CARE TRANSFER NOTE
Patient: Seth Null    Procedure(s):   SECTION repeat    Diagnosis: Previous  section [Z98.891]  Diagnosis Additional Information: No value filed.    Anesthesia Type:   Spinal     Note:      Level of Consciousness: awake  Oxygen Supplementation: room air    Independent Airway: airway patency satisfactory and stable  Dentition: dentition unchanged  Vital Signs Stable: post-procedure vital signs reviewed and stable  Report to RN Given: handoff report given  Patient transferred to: Labor and Delivery    Handoff Report: Identifed the Patient, Identified the Reponsible Provider, Reviewed the pertinent medical history, Discussed the surgical course, Reviewed Intra-OP anesthesia mangement and issues during anesthesia, Set expectations for post-procedure period and Allowed opportunity for questions and acknowledgement of understanding      Vitals: (Last set prior to Anesthesia Care Transfer)  CRNA VITALS  2021 0753 - 2021 0832      2021             Pulse:  92    SpO2:  98 %        Electronically Signed By: DONOVAN Dominguez CRNA  2021  8:32 AM

## 2021-04-13 NOTE — PLAN OF CARE
Returned to MAC # 1 at 0830 following a scheduled repeat . Incision is covered with an Island dressing which had blood shadowing covering most of the dressing. Reinforced with an ABD dressing and foam tape. Did complain of incisional pain. Fentanyl (see E MAR) was given with slight decrease in pain. Will continue to monitor.

## 2021-04-13 NOTE — PLAN OF CARE
Data: Vital signs within normal limits. Postpartum checks within normal limits - see flow record. Patient had one period of emesis, is tolerating and advancing diet slowly, has now tolerated food without issue. Rainey catheter removed at approximately 1100, Stood at bedside 1440, unable to void. Bladder scanned for 388, straight cath'd for 300 at 1445. No apparent signs of infection. Incision healing well. Patient performing self cares and is able to care for infant.  Action: Patient medicated during the shift for pain. See MAR. Patient reassessed within 1 hour after each medication and pain was improved - patient stated she was comfortable. Patient education done about postpartum cares, pain management, infant feeding plan and blood glucose monitoring. See flow record.  Response: Positive attachment behaviors observed with infant. Support persons , Luis F present.   Plan: Anticipate discharge on 4/16.

## 2021-04-13 NOTE — ANESTHESIA PREPROCEDURE EVALUATION
Anesthesia Pre-Procedure Evaluation    Patient: Seth Null   MRN: [id number] : 1990        Preoperative Diagnosis: Previous  section [Z98.891]   Procedure : Procedure(s):   SECTION repeat     Past Medical History:   Diagnosis Date     Diabetes (H)     GDM diet     NO ACTIVE PROBLEMS (aka NONE)       Past Surgical History:   Procedure Laterality Date      SECTION N/A 2019    Procedure:  SECTION;  Surgeon: Jayshree Danielle DO;  Location: [facility]     MAMMOPLASTY REDUCTION        No Known Allergies   Social History     Tobacco Use     Smoking status: Never Smoker     Smokeless tobacco: Never Used   Substance Use Topics     Alcohol use: No      Wt Readings from Last 1 Encounters:   21 85.3 kg (188 lb)        Anesthesia Evaluation            ROS/MED HX  ENT/Pulmonary:  - neg pulmonary ROS     Neurologic:  - neg neurologic ROS     Cardiovascular:  - neg cardiovascular ROS     METS/Exercise Tolerance:     Hematologic:  - neg hematologic  ROS     Musculoskeletal:       GI/Hepatic:     (+) GERD,     Renal/Genitourinary:  - neg Renal ROS     Endo: Comment: Gestational DM      Psychiatric/Substance Use:  - neg psychiatric ROS     Infectious Disease:  - neg infectious disease ROS     Malignancy:  - neg malignancy ROS     Other:      (+) Possibly pregnant, ,         Physical Exam    Airway        Mallampati: II   TM distance: > 3 FB   Neck ROM: full   Mouth opening: > 3 cm    Respiratory Devices and Support         Dental           Cardiovascular   cardiovascular exam normal          Pulmonary   pulmonary exam normal            Other findings: Lab Test        04/12/21     07/30/19     06/14/19     06/13/19     06/12/19     05/10/19                       1111          0928          0959          0518          1919          0856          WBC           --           --           --          12.9*        11.0         8.5           HGB          15.5         14.1         13.6          13.6         14.5         13.2          MCV           --           --           --          87           88           89            PLT           --           --           --          227          241          219           INR           --           --           --          0.92          --           --            Lab Test        06/22/18                       1411          NA           139           POTASSIUM    4.6           CHLORIDE     106           CO2          25            BUN          13            CR           0.62          ANIONGAP     8             SALO          9.1           GLC          89              OUTSIDE LABS:  CBC:   Lab Results   Component Value Date    WBC 12.9 (H) 06/13/2019    WBC 11.0 06/12/2019    HGB 15.5 04/12/2021    HGB 14.1 07/30/2019    HCT 39.5 06/13/2019    HCT 42.4 06/12/2019     06/13/2019     06/12/2019     BMP:   Lab Results   Component Value Date     06/22/2018    POTASSIUM 4.6 06/22/2018    CHLORIDE 106 06/22/2018    CO2 25 06/22/2018    BUN 13 06/22/2018    CR 0.62 06/22/2018    GLC 89 06/22/2018     COAGS:   Lab Results   Component Value Date    PTT 29 06/13/2019    INR 0.92 06/13/2019    FIBR 491 (H) 06/13/2019     POC:   Lab Results   Component Value Date    BGM 81 06/14/2019     HEPATIC:   Lab Results   Component Value Date    ALBUMIN 4.4 06/22/2018    PROTTOTAL 7.6 06/22/2018    ALT 20 06/22/2018    AST 19 06/22/2018    ALKPHOS 44 06/22/2018    BILITOTAL 0.5 06/22/2018     OTHER:   Lab Results   Component Value Date    SALO 9.1 06/22/2018    CRP <2.9 02/16/2018    SED 5 02/16/2018       Anesthesia Plan    ASA Status:  2      Anesthesia Type: Spinal.              Consents    Anesthesia Plan(s) and associated risks, benefits, and realistic alternatives discussed. Questions answered and patient/representative(s) expressed understanding.     - Discussed with:  Patient      - Extended Intubation/Ventilatory Support Discussed: No.      - Patient is DNR/DNI  Status: No    Use of blood products discussed: Yes.     - Discussed with: Patient.     - Consented: consented to blood products     Postoperative Care       PONV prophylaxis: Ondansetron (or other 5HT-3)     Comments:                Kevin Love MD

## 2021-04-13 NOTE — ANESTHESIA PROCEDURE NOTES
Intrathecal injection Procedure Note  Pre-Procedure   Staff -        Anesthesiologist:  Kevin Love MD       Performed By: anesthesiologist       Referred By: Dalila       Location: OR       Pre-Anesthestic Checklist: patient identified, IV checked, risks and benefits discussed, informed consent, monitors and equipment checked, pre-op evaluation, at physician/surgeon's request and post-op pain management  Timeout:       Correct Patient: Yes        Correct Procedure: Yes        Correct Site: Yes        Correct Position: Yes   Procedure Documentation  Procedure: intrathecal injection       Patient Position: sitting       Skin prep: Betadine       Insertion Site: L3-4. (midline approach).       Needle Gauge: 25.        Needle Length (Inches): 3        Spinal Needle Type: Jane tip       Introducer used       Introducer: 20 G      # of attempts: 1 and  # of redirects:     Assessment/Narrative         Paresthesias: No.       CSF fluid: clear.

## 2021-04-13 NOTE — ANESTHESIA POSTPROCEDURE EVALUATION
Patient: Seth Null    Procedure(s):   SECTION repeat    Diagnosis:Previous  section [Z98.891]  Diagnosis Additional Information: No value filed.    Anesthesia Type:  Spinal    Note:  Disposition: Outpatient   Postop Pain Control: Uneventful            Sign Out: Well controlled pain   PONV: No   Neuro/Psych: Uneventful            Sign Out: Acceptable/Baseline neuro status   Airway/Respiratory: Uneventful            Sign Out: Acceptable/Baseline resp. status   CV/Hemodynamics: Uneventful            Sign Out: Acceptable CV status   Other NRE: NONE   DID A NON-ROUTINE EVENT OCCUR? No         Last vitals:  Vitals:    21 1128 21 1224 21 1345   BP: 116/74 116/70 115/76   Pulse:      Resp: 16 16 16   Temp:      SpO2: 97% 97% 98%       Last vitals prior to Anesthesia Care Transfer:  CRNA VITALS  2021 0753 - 2021 0853      2021             Pulse:  92    SpO2:  98 %          Electronically Signed By: Marty Mendoza MD  2021  2:28 PM

## 2021-04-13 NOTE — OP NOTE
POST OPERATIVE NOTE-IMMEDIATE    Preoperative Diagnosis   1. Intrauterine pregnancy, Unknown  2. Previous low transverse  section.  3. See problem list below    Postoperative  Diagnosis   1. Intrauterine pregnancy, Unknown  2. Previous low transverse  section.  3. See problem list below    Patient Active Problem List   Diagnosis     Female infertility     Ganglion cyst of wrist, right     Encounter for triage in pregnant patient      contractions     S/P  section       Operative Procedure  1.  Repeat low transverse  section.    Surgeon(s) and Assistants (if any): Surgeon(s):  Natasha Hudson DO Henke, Leah Christine, MD    OR Staff:  Nurse: Evelyn Rivero RN  Circulator: Kamilla Shabazz RN; Yajaira Shirley RN  Scrub Person: Yajaira Torres  Baby : Joanna Garcia RN    Anesthesia:  Regional    Drains:  Rainey    Specimens:  Cord blood    Complications: None       Findings/Conclusions:    Findings: viable, cephalic, male infant. 9 lb, 3 oz. Agars of 9 at 1 minute and 9 at 5 minutes. Nuchal cord times X1. Normal uterus tubes and ovaries. The lower uterine segment was thin and would not recommend a TOLAC.     Estimated Blood Loss: 621    Condition on discharge from OR: Satisfactory    ANESTHESIA  Regional spinal.     The patient is a 30 year old Female: 1. Intrauterine pregnancy, 39w0d,   who presents for repeat .  There were the following complications in the pregnancy: GDM A2, history of PTD for abruption at 32 weeks, anxiety, indeterminate NIPT result.  All the risks, benefits, and alternatives were discussed with the patient including risk of anesthesia, infection, blood loss (need for transfusion), damage to other organs including bowel and bladder, and death.     OPERATIVE NOTE  With IV fluids running, the patient was brought to the room.  The patient transferred herself to the table.  She was induced with spinal anesthesia,  prepped and draped in the usual fashion.  A low transverse incision was made and the old scar was removed. The incision was carried down to the fascia.  The fascia was reflected cephalocaudad with blunt and sharp dissection.  The peritoneum was then entered and the midline extended cephalocaudad with blunt and sharp dissection.  The dian retractor was then placed.  The vesicouterine peritoneum was reflected off the lower uterine segment.  A low transverse incision was then made into the intrauterine cavity.  This was extended laterally with blunt dissection.  The infant was delivered without complications or difficulty. The cord was doubly clamped and cut.  The infant was passed off to the nurses in attendance.  The placenta delivered manually intact.  The uterus was externalized and uterine cavity was cleaned.  The uterine incision was closed with 0 vicryl in a running locking fashion.  A second layer of 0 vicryl was used to embrocate the first layer in a simple running fashion.       The cul-de-sac was then cleaned and the uterus returned to the abdomen.  The peritoneum was then closed with 2-0 vicryl.  The fascia was closed with 0 Vicryl.  The subcutaneous tissue was closed with 2-0 Vicryl.  Skin was closed with absorbable staples. Instrument, needle, and sponge counts were reported to be correct. The patient tolerated the procedure well and went to the recovery room in stable condition.        DO Anita Alcala...................  4/13/2021   8:35 AM

## 2021-04-13 NOTE — PLAN OF CARE
Pt s/p repeat C/S; DOS.  VSS and assessment WNL.  Pt mckeon removed this afternoon, pt due to void.  Pt straight cath x 2 this evening. Pain well controlled.  Incision covered c/d/i.  Tolerating activity. Regular diet, pt ate muffin, english muffin with peanut butter and brownie for dinner; HS glucose, 149.  Pt GDM - glucose checks WNL. Bottle feeding infant formula.  Positive bonding observed with infant.  Pt spouse at bedside and supportive of couplet.  Stable; continue with current plan of care.

## 2021-04-13 NOTE — PLAN OF CARE
Data: Seth Null transferred to 442 via cart at 1045. Baby transferred via crib.  Action: Receiving unit notified of transfer: Yes. Patient and family notified of room change. Report given to Savannah VALDEZ at 1110. Belongings sent to receiving unit. Accompanied by Registered Nurse. Oriented patient to surroundings. Call light within reach. ID bands double-checked with receiving RN.  Response: Patient tolerated transfer and is stable.

## 2021-04-13 NOTE — PLAN OF CARE
Patient presents to [facility] for scheduled C/S. VS stable. Positive fetal movement. EFM monitors explained and placed x's 2. Baseline  moderate variability with accelerations present. PIV placed. LR is infusing. Procedure explained to patient. Questions answered. Patient prepped for surgery.

## 2021-04-14 PROBLEM — Z36.89 ENCOUNTER FOR TRIAGE IN PREGNANT PATIENT: Status: RESOLVED | Noted: 2019-06-12 | Resolved: 2021-04-14

## 2021-04-14 PROBLEM — Z86.59 HISTORY OF ANXIETY: Status: ACTIVE | Noted: 2021-04-14

## 2021-04-14 PROBLEM — Z67.91 RH NEGATIVE, ANTEPARTUM: Status: ACTIVE | Noted: 2021-04-14

## 2021-04-14 PROBLEM — Z98.891 PREVIOUS CESAREAN SECTION: Status: ACTIVE | Noted: 2021-04-14

## 2021-04-14 PROBLEM — O26.899 RH NEGATIVE, ANTEPARTUM: Status: ACTIVE | Noted: 2021-04-14

## 2021-04-14 PROBLEM — O47.00 PRETERM CONTRACTIONS: Status: RESOLVED | Noted: 2019-06-12 | Resolved: 2021-04-14

## 2021-04-14 PROBLEM — M67.431 GANGLION CYST OF WRIST, RIGHT: Status: RESOLVED | Noted: 2019-02-21 | Resolved: 2021-04-14

## 2021-04-14 PROBLEM — Z98.891 S/P CESAREAN SECTION: Status: RESOLVED | Noted: 2019-06-13 | Resolved: 2021-04-14

## 2021-04-14 PROBLEM — O24.414 INSULIN CONTROLLED GESTATIONAL DIABETES MELLITUS (GDM) DURING PREGNANCY, ANTEPARTUM: Status: ACTIVE | Noted: 2021-04-14

## 2021-04-14 LAB
GLUCOSE BLDC GLUCOMTR-MCNC: 85 MG/DL (ref 70–99)
HGB BLD-MCNC: 12.8 G/DL (ref 11.7–15.7)

## 2021-04-14 PROCEDURE — 250N000011 HC RX IP 250 OP 636: Performed by: OBSTETRICS & GYNECOLOGY

## 2021-04-14 PROCEDURE — 250N000013 HC RX MED GY IP 250 OP 250 PS 637: Performed by: OBSTETRICS & GYNECOLOGY

## 2021-04-14 PROCEDURE — 999N001017 HC STATISTIC GLUCOSE BY METER IP

## 2021-04-14 PROCEDURE — 120N000001 HC R&B MED SURG/OB

## 2021-04-14 PROCEDURE — 36415 COLL VENOUS BLD VENIPUNCTURE: CPT | Performed by: OBSTETRICS & GYNECOLOGY

## 2021-04-14 PROCEDURE — 85018 HEMOGLOBIN: CPT | Performed by: OBSTETRICS & GYNECOLOGY

## 2021-04-14 RX ORDER — OXYCODONE HYDROCHLORIDE 5 MG/1
5 TABLET ORAL EVERY 4 HOURS PRN
Qty: 8 TABLET | Refills: 0 | Status: SHIPPED | OUTPATIENT
Start: 2021-04-14 | End: 2021-04-15

## 2021-04-14 RX ORDER — AMOXICILLIN 250 MG
1 CAPSULE ORAL 2 TIMES DAILY PRN
Qty: 60 TABLET | Refills: 0 | Status: SHIPPED | OUTPATIENT
Start: 2021-04-14

## 2021-04-14 RX ORDER — ACETAMINOPHEN 325 MG/1
650 TABLET ORAL EVERY 6 HOURS PRN
Qty: 30 TABLET | Refills: 0 | Status: SHIPPED | OUTPATIENT
Start: 2021-04-14

## 2021-04-14 RX ORDER — IBUPROFEN 800 MG/1
800 TABLET, FILM COATED ORAL EVERY 6 HOURS PRN
Qty: 30 TABLET | Refills: 0 | Status: SHIPPED | OUTPATIENT
Start: 2021-04-14

## 2021-04-14 RX ADMIN — IBUPROFEN 800 MG: 800 TABLET ORAL at 15:46

## 2021-04-14 RX ADMIN — PRENATAL VITAMINS-IRON FUMARATE 27 MG IRON-FOLIC ACID 0.8 MG TABLET 1 TABLET: at 09:02

## 2021-04-14 RX ADMIN — IBUPROFEN 800 MG: 800 TABLET ORAL at 09:01

## 2021-04-14 RX ADMIN — ACETAMINOPHEN 975 MG: 325 TABLET, FILM COATED ORAL at 18:15

## 2021-04-14 RX ADMIN — IBUPROFEN 800 MG: 800 TABLET ORAL at 21:56

## 2021-04-14 RX ADMIN — DOCUSATE SODIUM 50 MG AND SENNOSIDES 8.6 MG 1 TABLET: 8.6; 5 TABLET, FILM COATED ORAL at 09:01

## 2021-04-14 RX ADMIN — ACETAMINOPHEN 975 MG: 325 TABLET, FILM COATED ORAL at 12:26

## 2021-04-14 RX ADMIN — KETOROLAC TROMETHAMINE 30 MG: 30 INJECTION, SOLUTION INTRAMUSCULAR at 03:43

## 2021-04-14 RX ADMIN — ACETAMINOPHEN 975 MG: 325 TABLET, FILM COATED ORAL at 00:44

## 2021-04-14 RX ADMIN — DOCUSATE SODIUM 50 MG AND SENNOSIDES 8.6 MG 1 TABLET: 8.6; 5 TABLET, FILM COATED ORAL at 21:56

## 2021-04-14 RX ADMIN — ACETAMINOPHEN 975 MG: 325 TABLET, FILM COATED ORAL at 06:37

## 2021-04-14 NOTE — PLAN OF CARE
Data: Vital signs within normal limits. Postpartum checks within normal limits - see flow record. Patient eating and drinking normally. Patient able to empty bladder independently and is up ambulating. No apparent signs of infection. Incision healing well. Patient performing self cares and is able to care for infant.  Action: Patient medicated during the shift for pain and cramping. See MAR. Patient reassessed within 1 hour after each medication and pain was improved - patient stated she was comfortable. Patient education done about infant and self cares. See flow record.  Response: Positive attachment behaviors observed with infant. Support persons  present.   Plan: Anticipate discharge on 4/15/2021.

## 2021-04-14 NOTE — PLAN OF CARE
Pt able to get small periods of rest between cares.  States ordered pain medications keeping her comfortable.  Able to void 400 ml @ 0330; encouraged to continue to void Q 3 hrs; pt verbalizes understanding.  Ambulating independently.  Bonding well w/  and spouse present and supportive.

## 2021-04-14 NOTE — DISCHARGE SUMMARY
[facility]    Discharge Summary  Obstetrics    Date of Admission:  2021  Date of Discharge:  4/15/2021  Discharging Provider: Nani Gonzalez MD      Discharge Diagnoses   Patient Active Problem List   Diagnosis     Female infertility     S/P      Previous  section     Insulin controlled gestational diabetes mellitus (GDM) during pregnancy, antepartum     Rh negative, antepartum     History of anxiety       History of Present Illness   Seth Null is a 30 year old female now  who presented to L&D @ 39w0d GA admitted for scheduled repeat CS  . Her pregnancy has been complicated by   -GDMA2  - hx of anxiety  - Rh neg  - hx of CD    Please see her admit H&P for full details of her PMH, PSH, Meds, Allergies and exam on admit.    Hospital Course   POST OPERATIVE NOTE-IMMEDIATE     Preoperative Diagnosis   1. Intrauterine pregnancy, Unknown  2. Previous low transverse  section.  3. See problem list below     Postoperative  Diagnosis   1. Intrauterine pregnancy, Unknown  2. Previous low transverse  section.  3. See problem list below         Patient Active Problem List   Diagnosis     Female infertility     Ganglion cyst of wrist, right     Encounter for triage in pregnant patient      contractions     S/P  section         Operative Procedure  1.  Repeat low transverse  section.     Surgeon(s) and Assistants (if any): Surgeon(s):  Natasha Hudson DO Henke, Leah Christine, MD     OR Staff:  Nurse: Evelyn Rivero RN  Circulator: Kamilla Shabazz RN; Yajaira Shirley RN  Scrub Person: Yajaira Torres  Baby : Joanna Garcia RN     Anesthesia:  Regional     Drains:  Rainey     Specimens:  Cord blood     Complications: None        Findings/Conclusions:    Findings: viable, cephalic, male infant. 9 lb, 3 oz. Agars of 9 at 1 minute and 9 at 5 minutes. Nuchal cord times X1. Normal uterus tubes and ovaries. The  lower uterine segment was thin and would not recommend a TOLAC.      Estimated Blood Loss: 621     Condition on discharge from OR: Satisfactory     ANESTHESIA  Regional spinal.      The patient is a 30 year old Female: 1. Intrauterine pregnancy, 39w0d,   who presents for repeat .  There were the following complications in the pregnancy: GDM A2, history of PTD for abruption at 32 weeks, anxiety, indeterminate NIPT result.  All the risks, benefits, and alternatives were discussed with the patient including risk of anesthesia, infection, blood loss (need for transfusion), damage to other organs including bowel and bladder, and death.      OPERATIVE NOTE  With IV fluids running, the patient was brought to the room.  The patient transferred herself to the table.  She was induced with spinal anesthesia, prepped and draped in the usual fashion.  A low transverse incision was made and the old scar was removed. The incision was carried down to the fascia.  The fascia was reflected cephalocaudad with blunt and sharp dissection.  The peritoneum was then entered and the midline extended cephalocaudad with blunt and sharp dissection.  The dian retractor was then placed.  The vesicouterine peritoneum was reflected off the lower uterine segment.  A low transverse incision was then made into the intrauterine cavity.  This was extended laterally with blunt dissection.  The infant was delivered without complications or difficulty. The cord was doubly clamped and cut.  The infant was passed off to the nurses in attendance.  The placenta delivered manually intact.  The uterus was externalized and uterine cavity was cleaned.  The uterine incision was closed with 0 vicryl in a running locking fashion.  A second layer of 0 vicryl was used to embrocate the first layer in a simple running fashion.         The cul-de-sac was then cleaned and the uterus returned to the abdomen.  The peritoneum was then closed with 2-0  vicryl.  The fascia was closed with 0 Vicryl.  The subcutaneous tissue was closed with 2-0 Vicryl.  Skin was closed with absorbable staples. Instrument, needle, and sponge counts were reported to be correct. The patient tolerated the procedure well and went to the recovery room in stable condition.        Her postpartum course was uncomplicated. On postpartum day 2, she was meeting all of her postpartum goals and deemed stable for discharge. She was voiding without difficulty, tolerating a regular diet without nausea and vomiting, her pain was well controlled on oral pain medicines and her lochia was appropriate.    Hgb:   Lab Results   Component Value Date    HGB 12.8 2021    HGB 15.5 2021       Lab Results   Component Value Date    RH Neg 2021    and rhogam was not indicated    Contraception was discussed and will be addressed at her postpartum appointment.    Instructions:  1) Call for temperature greater than 100.4F, foul smelling vaginal discharge, bleeding more than 1 pad per hour for 2 hrs, pain not controlled by oral pain meds, severe constipation or severe nausea or vomiting.  2) She was instructed to follow-up with her primary OB in 6 weeks for a routine postpartum visit  3) She was instructed to continue her PNV on discharge if she wished to breast feed her infant.    Laura Infante MD, MD    Discharge Disposition   Discharged to home   Condition at discharge: Stable    Primary Care Physician   Natasha Koch    Consultations This Hospital Stay   HOME CARE POST PARTUM/ IP CONSULT  LACTATION IP CONSULT    Discharge Orders   No discharge procedures on file.  Discharge Medications   Current Discharge Medication List      START taking these medications    Details   acetaminophen (TYLENOL) 325 MG tablet Take 2 tablets (650 mg) by mouth every 6 hours as needed for mild pain  Qty: 30 tablet, Refills: 0    Associated Diagnoses: S/P       ibuprofen (ADVIL/MOTRIN) 800 MG  tablet Take 1 tablet (800 mg) by mouth every 6 hours as needed for moderate pain  Qty: 30 tablet, Refills: 0    Associated Diagnoses: S/P       oxyCODONE (ROXICODONE) 5 MG tablet Take 1 tablet (5 mg) by mouth every 4 hours as needed for breakthrough pain or severe pain  Qty: 8 tablet, Refills: 0    Associated Diagnoses: S/P       senna-docusate (SENOKOT-S/PERICOLACE) 8.6-50 MG tablet Take 1 tablet by mouth 2 times daily as needed for constipation  Qty: 60 tablet, Refills: 0    Associated Diagnoses: S/P          CONTINUE these medications which have NOT CHANGED    Details   Prenatal Vit-Fe Fumarate-FA (PRENATAL MULTIVITAMIN W/IRON) 27-0.8 MG tablet Take 1 tablet by mouth daily         STOP taking these medications       insulin  UNIT/ML injection Comments:   Reason for Stopping:             Allergies   No Known Allergies

## 2021-04-14 NOTE — PROGRESS NOTES
"[facility]  POD#1      Pt doing well. Ambulating, tolerating PO. Decreased lochia. Pain controlled. Rainey removed earlier this morning, pt is voiding without difficulties. Baby is getting circumcised today. She is planning to hopefully go home tomorrow if \"everything is alright\". Feeding formula and coping well with baby.     Vitals:    21 1345 21 1429 21 1600 21 2100   BP: 115/76 110/77 115/70 106/60   Pulse:   89 91   Resp: 16 17 18 18   Temp:   98.8  F (37.1  C) 97.9  F (36.6  C)   TempSrc:   Oral Oral   SpO2: 98% 96% 95% 96%   Weight:       Height:         Abd soft, NTGR, FFBU, no fundal tenderness, incision well approximated with sutures, OR dressing dry and clean removed, second OR dressing with mild shadowing that is marked, no active bleeding, well approximated and healing well. Will remove second OR dressing this afternoon.   Ext no edema, no cyanosis, pulses +    Results for CHILO SAINI (MRN [id number]) as of 2021 09:05   Ref. Range 2021 10:45 2021 16:15 2021 22:21 2021 06:45 2021 08:23   Glucose Latest Ref Range: 70 - 99 mg/dL  133 (H) 149 (H)  85     Lab Results   Component Value Date    HGB 12.8 2021         A/P 30 year old  at Unknown s/p LTCS POD# 1    -hemodynamically stable   - Rh neg - fetal Rh is negative - rhogam shot not indicated  -Diet regular  -Pain control with Tylenol, Motrin and Oxycodone  -DVT ppx - SCDs while on bed and ambulation  -bowel ppx- Senna/docusate, simethicone  -Breast feeding  -Tdap and flu given prenatally  -Rubella immune  -BC; not discussed  - wound care discussed    GDMA2  - Fasting BS WNL  - will need 2 hr GTT at 6 wk PP visit    ALEX  - mood is stable  - no meds    Plan; continue routine PP care. Anticipate discharge home tomorrow. OR bandage dressing to be removed this afternoon.     Dr. Adalberto Infante  [phone]  2021 9:34 PM    "

## 2021-04-15 VITALS
WEIGHT: 188 LBS | TEMPERATURE: 98.4 F | HEART RATE: 98 BPM | DIASTOLIC BLOOD PRESSURE: 81 MMHG | RESPIRATION RATE: 16 BRPM | HEIGHT: 65 IN | SYSTOLIC BLOOD PRESSURE: 116 MMHG | BODY MASS INDEX: 31.32 KG/M2 | OXYGEN SATURATION: 98 %

## 2021-04-15 PROCEDURE — 250N000013 HC RX MED GY IP 250 OP 250 PS 637: Performed by: OBSTETRICS & GYNECOLOGY

## 2021-04-15 RX ORDER — OXYCODONE HYDROCHLORIDE 5 MG/1
5 TABLET ORAL EVERY 4 HOURS PRN
Qty: 10 TABLET | Refills: 0 | Status: SHIPPED | OUTPATIENT
Start: 2021-04-15

## 2021-04-15 RX ADMIN — IBUPROFEN 800 MG: 800 TABLET ORAL at 05:15

## 2021-04-15 RX ADMIN — DOCUSATE SODIUM 50 MG AND SENNOSIDES 8.6 MG 1 TABLET: 8.6; 5 TABLET, FILM COATED ORAL at 08:57

## 2021-04-15 RX ADMIN — ACETAMINOPHEN 975 MG: 325 TABLET, FILM COATED ORAL at 00:38

## 2021-04-15 RX ADMIN — IBUPROFEN 800 MG: 800 TABLET ORAL at 10:28

## 2021-04-15 RX ADMIN — PRENATAL VITAMINS-IRON FUMARATE 27 MG IRON-FOLIC ACID 0.8 MG TABLET 1 TABLET: at 08:57

## 2021-04-15 RX ADMIN — ACETAMINOPHEN 975 MG: 325 TABLET, FILM COATED ORAL at 07:05

## 2021-04-15 NOTE — PROGRESS NOTES
Patient Name:  Seth Null   MRN:  [id number]  Age:  30 year old    YOB: 1990      POSTPARTUM/POST-OPERATIVE PROGRESS NOTE    Pt is POD#2 s/p C/S.  She is doing well without complaints.  Pt is ambulating and tolerating a regular diet.  Rainey is out and she is voiding without complication.  Pain is well controlled with PO medication and lochia is within normal limits.  She is formula feeding.  Baby is doing well.      Objective:    Temp:  [97.8  F (36.6  C)-98.4  F (36.9  C)] 98.4  F (36.9  C)  Pulse:  [72-98] 98  Resp:  [16-18] 16  BP: (112-116)/(75-83) 116/81  188 lbs 0 oz      General Appearance:  NAD, A&O x 3, comfortable  Lungs:  unlabored  Cardiovascular:  RRR  Abdomen:  soft, minimally distended; appropriately tender near incision; no rebound or guarding  Fundus:  firm, below the umbilicus  Incision:  clean, dry and intact; no erythema, drainage, bleeding, fluctuance, or induration.  Lower extremities:  no significant edema      Lab Review:    Hemoglobin   Date Value Ref Range Status   2021 12.8 11.7 - 15.7 g/dL Final   2021 15.5 11.7 - 15.7 g/dL Final   2019 14.1 11.7 - 15.7 g/dL Final     Hematocrit   Date Value Ref Range Status   2019 39.5 35.0 - 47.0 % Final   2019 42.4 35.0 - 47.0 % Final   05/10/2019 38.7 35.0 - 47.0 % Final           Assessment: 29yo  POD#2 s/p scheduled repeat , doing well.      Plan:  - Post-op: recovering well. Pain well controlled. Cont PO pain meds and regular diet. Encourage ambulation.  - GDMA2: repeat GTT at 6 weeks PP  - Rh neg: Rhogam not needed  - Contraception: COCPs  - Dispo: anticipate DC today    Meredith Chan, MD [facility]  April 15, 2021

## 2021-04-15 NOTE — DISCHARGE INSTRUCTIONS

## 2021-04-15 NOTE — PLAN OF CARE
VSS, uterus and bleeding WNL. Voiding without difficulty. Up ad molly. Pain well-managed by Tylenol and ibuprofen. Independent with self and  cares. Formula-feeding.  present and supportive. Undecided if will go home today.

## 2021-04-15 NOTE — PLAN OF CARE
Reviewed discharge instructions with patient. Patient verbalizes and understands information. No further questions. Patient discharged in stable condition to home. Patient left unit at 1400.

## 2021-05-03 ENCOUNTER — IMMUNIZATION (OUTPATIENT)
Dept: NURSING | Facility: CLINIC | Age: 31
End: 2021-05-03
Payer: COMMERCIAL

## 2021-05-03 PROCEDURE — 0001A PR COVID VAC PFIZER DIL RECON 30 MCG/0.3 ML IM: CPT

## 2021-05-03 PROCEDURE — 91300 PR COVID VAC PFIZER DIL RECON 30 MCG/0.3 ML IM: CPT

## 2021-05-24 ENCOUNTER — IMMUNIZATION (OUTPATIENT)
Dept: NURSING | Facility: CLINIC | Age: 31
End: 2021-05-24
Attending: INTERNAL MEDICINE
Payer: COMMERCIAL

## 2021-05-24 PROCEDURE — 0002A PR COVID VAC PFIZER DIL RECON 30 MCG/0.3 ML IM: CPT

## 2021-05-24 PROCEDURE — 91300 PR COVID VAC PFIZER DIL RECON 30 MCG/0.3 ML IM: CPT

## 2021-05-29 NOTE — PROGRESS NOTES
"Please see \"Imaging\" tab under \"Chart Review\" for details of today's visit.    Rich Canseco        "

## 2021-10-03 ENCOUNTER — HEALTH MAINTENANCE LETTER (OUTPATIENT)
Age: 31
End: 2021-10-03

## 2022-01-23 ENCOUNTER — HEALTH MAINTENANCE LETTER (OUTPATIENT)
Age: 32
End: 2022-01-23

## 2022-08-24 ENCOUNTER — APPOINTMENT (OUTPATIENT)
Dept: URBAN - METROPOLITAN AREA CLINIC 253 | Age: 32
Setting detail: DERMATOLOGY
End: 2022-08-25

## 2022-08-24 VITALS — HEIGHT: 65 IN | RESPIRATION RATE: 14 BRPM | WEIGHT: 150 LBS

## 2022-08-24 DIAGNOSIS — L81.1 CHLOASMA: ICD-10-CM

## 2022-08-24 DIAGNOSIS — D18.0 HEMANGIOMA: ICD-10-CM

## 2022-08-24 DIAGNOSIS — Q828 OTHER SPECIFIED ANOMALIES OF SKIN: ICD-10-CM

## 2022-08-24 DIAGNOSIS — Q826 OTHER SPECIFIED ANOMALIES OF SKIN: ICD-10-CM

## 2022-08-24 DIAGNOSIS — L21.8 OTHER SEBORRHEIC DERMATITIS: ICD-10-CM

## 2022-08-24 DIAGNOSIS — L81.4 OTHER MELANIN HYPERPIGMENTATION: ICD-10-CM

## 2022-08-24 DIAGNOSIS — L71.8 OTHER ROSACEA: ICD-10-CM

## 2022-08-24 DIAGNOSIS — Z71.89 OTHER SPECIFIED COUNSELING: ICD-10-CM

## 2022-08-24 DIAGNOSIS — D22 MELANOCYTIC NEVI: ICD-10-CM

## 2022-08-24 DIAGNOSIS — Q819 OTHER SPECIFIED ANOMALIES OF SKIN: ICD-10-CM

## 2022-08-24 PROBLEM — D18.01 HEMANGIOMA OF SKIN AND SUBCUTANEOUS TISSUE: Status: ACTIVE | Noted: 2022-08-24

## 2022-08-24 PROBLEM — D22.5 MELANOCYTIC NEVI OF TRUNK: Status: ACTIVE | Noted: 2022-08-24

## 2022-08-24 PROBLEM — D23.72 OTHER BENIGN NEOPLASM OF SKIN OF LEFT LOWER LIMB, INCLUDING HIP: Status: ACTIVE | Noted: 2022-08-24

## 2022-08-24 PROBLEM — L85.8 OTHER SPECIFIED EPIDERMAL THICKENING: Status: ACTIVE | Noted: 2022-08-24

## 2022-08-24 PROCEDURE — OTHER COUNSELING: OTHER

## 2022-08-24 PROCEDURE — OTHER EDUCATIONAL RESOURCES PROVIDED: OTHER

## 2022-08-24 PROCEDURE — OTHER MIPS QUALITY: OTHER

## 2022-08-24 PROCEDURE — OTHER PRESCRIPTION: OTHER

## 2022-08-24 PROCEDURE — 99204 OFFICE O/P NEW MOD 45 MIN: CPT

## 2022-08-24 RX ORDER — KETOCONAZOLE 20 MG/ML
2% SHAMPOO, SUSPENSION TOPICAL TIW
Qty: 120 | Refills: 11 | Status: ERX | COMMUNITY
Start: 2022-08-24

## 2022-08-24 ASSESSMENT — LOCATION ZONE DERM
LOCATION ZONE: SCALP
LOCATION ZONE: ARM
LOCATION ZONE: LEG
LOCATION ZONE: FACE
LOCATION ZONE: TRUNK

## 2022-08-24 ASSESSMENT — LOCATION DETAILED DESCRIPTION DERM
LOCATION DETAILED: LEFT INFERIOR CENTRAL MALAR CHEEK
LOCATION DETAILED: RIGHT DISTAL POSTERIOR UPPER ARM
LOCATION DETAILED: INFERIOR THORACIC SPINE
LOCATION DETAILED: LEFT SUPERIOR PARIETAL SCALP
LOCATION DETAILED: RIGHT ANTERIOR PROXIMAL THIGH
LOCATION DETAILED: LEFT DISTAL POSTERIOR UPPER ARM
LOCATION DETAILED: LEFT INFERIOR MEDIAL MALAR CHEEK

## 2022-08-24 ASSESSMENT — LOCATION SIMPLE DESCRIPTION DERM
LOCATION SIMPLE: RIGHT THIGH
LOCATION SIMPLE: LEFT POSTERIOR UPPER ARM
LOCATION SIMPLE: UPPER BACK
LOCATION SIMPLE: LEFT CHEEK
LOCATION SIMPLE: RIGHT POSTERIOR UPPER ARM
LOCATION SIMPLE: SCALP

## 2022-08-24 NOTE — PROCEDURE: COUNSELING
Detail Level: Generalized
Detail Level: Zone
Detail Level: Detailed
Detail Level: Simple
Patient Specific Counseling (Will Not Stick From Patient To Patient): I recommended the patient use Cera Ve  or  Duty to manage.

## 2022-09-10 ENCOUNTER — HEALTH MAINTENANCE LETTER (OUTPATIENT)
Age: 32
End: 2022-09-10

## 2022-12-17 NOTE — DISCHARGE SUMMARY
27 y/o  s/p ERCS POD # 3  Hemoglobin   Date Value Ref Range Status   2019 13.6 11.7 - 15.7 g/dL Final   ]    d/c home   On colace, breast pump and ibuprofen ferrous sulfate oxycodone   Follow-up in 1 week for post op check   Follow-up in 6 weeks for post partum examination    [facility]   Obstetrics Post-Op / Progress Note         Assessment and Plan:    Assessment:   Post-operative day #1  Low transverse primary  section  L&D complications:  section, abruption       Doing well.      Plan:   Ambulation encouraged  Discharge later today            Interval History:   Doing well.  Pain is well-controlled.  No fevers.  No history of wound drainage, warmth or significant erythema.  Good appetite.  Denies chest pain, shortness of breath, nausea or vomiting.  Ambulatory.  Breastfeeding well.          Significant Problems:    None          Review of Systems:    CONSTITUTIONAL: NEGATIVE for fever, chills, change in weight  INTEGUMENTARY/SKIN: NEGATIVE for worrisome rashes, moles or lesions  EYES: NEGATIVE for vision changes or irritation  ENT/MOUTH: NEGATIVE for ear, mouth and throat problems  RESP: NEGATIVE for significant cough or SOB  BREAST: NEGATIVE for masses, tenderness or discharge  CV: NEGATIVE for chest pain, palpitations or peripheral edema  GI: NEGATIVE for nausea, abdominal pain, heartburn, or change in bowel habits  : NEGATIVE for frequency, dysuria, or hematuria  MUSCULOSKELETAL: NEGATIVE for significant arthralgias or myalgia  NEURO: NEGATIVE for weakness, dizziness or paresthesias  ENDOCRINE: NEGATIVE for temperature intolerance, skin/hair changes  HEME: NEGATIVE for bleeding problems  PSYCHIATRIC: NEGATIVE for changes in mood or affect          Medications:   All medications related to the patient's surgery have been reviewed  -          Physical Exam:     All vitals stable  Patient Vitals for the past 8 hrs:   BP Temp Temp src Heart Rate Resp   19 0130  126/81 98.5  F (36.9  C) Oral 87 16     Wound clean and dry with minimal or no drainage.  Surrounding skin with minimal erythema.          Data:     All laboratory data related to this surgery reviewed  Hemoglobin   Date Value Ref Range Status   06/14/2019 13.6 11.7 - 15.7 g/dL Final   06/13/2019 13.6 11.7 - 15.7 g/dL Final   06/12/2019 14.5 11.7 - 15.7 g/dL Final   05/10/2019 13.2 11.7 - 15.7 g/dL Final   12/03/2018 13.7 11.7 - 15.7 g/dL Final     -    Tina Lewis, DO      Dr. Tina Lewis, DO    OB/GYN   [facility]       3

## 2023-04-30 ENCOUNTER — HEALTH MAINTENANCE LETTER (OUTPATIENT)
Age: 33
End: 2023-04-30

## 2025-05-27 ENCOUNTER — OFFICE VISIT (OUTPATIENT)
Dept: SURGERY | Facility: CLINIC | Age: 35
End: 2025-05-27
Payer: COMMERCIAL

## 2025-05-27 DIAGNOSIS — M26.69: Primary | ICD-10-CM

## 2025-06-11 DIAGNOSIS — M26.639: Primary | ICD-10-CM

## 2025-07-09 NOTE — PROGRESS NOTES
"ORAL & MAXILLOFACIAL SURGERY CONSULTATION NOTE    CC: \"I have issues with my jaw joint\"    HISTORY OF PRESENT ILLNESS:   35 yo F presents for consult regarding deviation of her jaw on opening and pain. She reports that ~1.5 years ago she noticed that her jaw was shifting to the right when she opened. She saw her dentist who referred her to TMD specialist and they made a splint for her. She reports pain with yawning and chewing. SHe reports that she avoids chewier foods (starburts, has to eat smaller bites of sandwiches). She denies episodes of locking, she reports pain in the R masseter that can range from (2-6 out of 10). She reports that she has gotten used to her symptoms but they are slightly debilitating to her daily life.     She reported that she tried PT for 8-9 months, she tried pain meds and muscles relaxants as well. She had little benefit from these.     PMH:  anxiety    PAST SURGICAL HISTORY:    breast reduction    MEDICATIONS:  birth control  anxiety medication (she is unsure which)    ALLERGIES:  NKDA    SOCIAL HISTORY:  Denies    REVIEW OF SYSTEMS  ROS reviewed and negative aside from listed in HPI    PHYSICAL EXAM:  GEN: WD/WN, NAD  HEENT: NC/AT, EOMI, PERRL, no facial edema, induration or erythema, no abnormal skin lesions, inferior border of mandible is palpable bilaterally, neck soft with no palpable lymph nodes, MAURICIO ~33mm, OP clear, uvula midline, fair oral hygiene, intact adult dentition, no vestibular edema, FOM ND/NT, no erythema/ulcerations/pigmentations/exophytic lesions  Neuro: AAOx4, CN V and CN VII intact    Mallampati II and normal neck mobility observed.    TMJ exam:  MAURICIO ~33mm w/o pain, can open 42mm with pain  protrusive 6mm with pain  lateral excursive: Left 7mm with pain, Right 11 mm  pain of R TMJ with loading on the left  Mn midline is deviated 4mm to L in relation to Mx midline    IMAGING:  MR TMJ - imaging independently reviewed, (in rayus)  L TMJ stage II-III internal " derangment  R TMJ stage IV-V internal derangment with anteriorly displaced disc      ASSESSMENT:   33 yo F presents with internal derangement of R TMJ with anteriorly displaced disc without reduction, right TMJ arthralgia with indication for arthroscopy of the R TMJ.     PLAN:  R TMJ arthroscopy with healon and PRP/PRF under GA in the OR    Risks and benefits explained to pt including, but not limited to;   Pain, bleeding, swelling, infections, need for further surgery, need for open joint surgery, displacement of scope into ear/brain, nerve injury (permanent vs temporary).   Questions were invited and answered.  Anesthesia modalities covered, risks and benefits of GA explained to pt including but not limited to:   Allergic reaction to the drugs, bruise/itching where the IV is started, intubation, coma and death.   Patient elects to have procedure done under GA    NV: R TMJ arthroscopy with healon and PRP/PRF    Sylvie Mclean DDS   [facility] intern     Findings, assessment, and plan discussed with Dr. Cates    I evaluated the patient with the resident and agree with the assessment and plan.

## 2025-07-09 NOTE — PRE-PROCEDURE
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   RCRI: 0.4%    Allergies: No Known Allergies    Pre-Operative Medications:   Ancef 2 g IV  Acetaminophen 975 mg    Resident Surgeon:   NAOMI Chun DDS    Staff Surgeon:   Erica Cates DDS, FACS    Risks and Complications discussed with patient/guardian/parents to include, but not limited to bleeding, infection, swelling, pain, temporary/permanent hypoesthesia/paresthesia CN VII/facial nerve distribution, failure of treatment, need for additional treatment/procedures. Consent will be written. All questions were answered.    Cory Rutherford DDS  OMFS PGY1  [facility]

## 2025-07-14 RX ORDER — LEVONORGESTREL/ETHIN.ESTRADIOL 0.1-0.02MG
1 TABLET ORAL DAILY
Status: ON HOLD | COMMUNITY
End: 2025-07-16

## 2025-07-14 RX ORDER — SERTRALINE HYDROCHLORIDE 100 MG/1
100 TABLET, FILM COATED ORAL DAILY
Status: ON HOLD | COMMUNITY
End: 2025-07-16

## 2025-07-14 RX ORDER — DOXYCYCLINE 100 MG/1
100 CAPSULE ORAL 2 TIMES DAILY
Status: ON HOLD | COMMUNITY
Start: 2025-07-08 | End: 2025-07-16

## 2025-07-15 ENCOUNTER — ANESTHESIA EVENT (OUTPATIENT)
Dept: SURGERY | Facility: CLINIC | Age: 35
End: 2025-07-15
Payer: COMMERCIAL

## 2025-07-15 NOTE — ANESTHESIA PREPROCEDURE EVALUATION
Anesthesia Pre-Procedure Evaluation    Patient: Seth Null   MRN: [id number] : 1990          Procedure : Procedure(s):  ARTHROSCOPY, TEMPOROMANDIBULAR JOINT with Healon and platelet rich plasma         Past Medical History:   Diagnosis Date    Diabetes (H)     GDM diet    NO ACTIVE PROBLEMS (aka NONE)       Past Surgical History:   Procedure Laterality Date     SECTION N/A 2019    Procedure:  SECTION;  Surgeon: Jayshree Danielle DO;  Location: [facility] L+D     SECTION N/A 2021    Procedure:  SECTION repeat;  Surgeon: Natasha Hudson DO;  Location: [facility] L+D    MAMMOPLASTY REDUCTION        No Known Allergies   Social History     Tobacco Use    Smoking status: Never    Smokeless tobacco: Never   Substance Use Topics    Alcohol use: No      Wt Readings from Last 1 Encounters:   21 85.3 kg (188 lb)        Anesthesia Evaluation            ROS/MED HX  ENT/Pulmonary: Comment: Right TMJ arthralgia and hypomobility      Neurologic:  - neg neurologic ROS     Cardiovascular:  - neg cardiovascular ROS     METS/Exercise Tolerance:     Hematologic:  - neg hematologic  ROS     Musculoskeletal:  - neg musculoskeletal ROS     GI/Hepatic:  - neg GI/hepatic ROS     Renal/Genitourinary:  - neg Renal ROS     Endo: Comment: Gestational diabetes      Psychiatric/Substance Use:     (+) psychiatric history anxiety and depression       Infectious Disease:  - neg infectious disease ROS     Malignancy:  - neg malignancy ROS     Other:              Physical Exam  Airway  Mallampati: II  TM distance: >3 FB  Neck ROM: full  Mouth opening: < 4 cm    Cardiovascular - normal exam   Dental   (+) Minor Abnormalities - some fillings, tiny chips      Pulmonary - normal exam      Neurological   She appears oriented x3.    Other Findings       OUTSIDE LABS:  CBC:   Lab Results   Component Value Date    WBC 12.9 (H) 2019    WBC 11.0 2019    HGB 12.8 2021    HGB 15.5 2021     HCT 39.5 06/13/2019    HCT 42.4 06/12/2019     06/13/2019     06/12/2019     BMP:   Lab Results   Component Value Date     06/22/2018    POTASSIUM 4.6 06/22/2018    CHLORIDE 106 06/22/2018    CO2 25 06/22/2018    BUN 13 06/22/2018    CR 0.62 06/22/2018    GLC 89 06/22/2018     COAGS:   Lab Results   Component Value Date    PTT 29 06/13/2019    INR 0.92 06/13/2019    FIBR 491 (H) 06/13/2019     POC:   Lab Results   Component Value Date    BGM 85 04/14/2021     HEPATIC:   Lab Results   Component Value Date    ALBUMIN 4.4 06/22/2018    PROTTOTAL 7.6 06/22/2018    ALT 20 06/22/2018    AST 19 06/22/2018    ALKPHOS 44 06/22/2018    BILITOTAL 0.5 06/22/2018     OTHER:   Lab Results   Component Value Date    SALO 9.1 06/22/2018    CRP <2.9 02/16/2018    SED 5 02/16/2018       Anesthesia Plan    ASA Status:  1       Anesthesia Type: General.  Airway: oral.  Induction: intravenous.  Maintenance: TIVA.   Techniques and Equipment:     - Airway:  Planned airway equipment includes video laryngoscope.     - Monitoring Plan: standard ASA monitoring, train of four monitoring, processed EEG monitor     Consents            Postoperative Care    Pain management: non-narcotic analgesics, plan for postoperative opioid use, multimodal analgesia.     Comments:                   Georgia Hernandez MD    I have reviewed the pertinent notes and labs in the chart from the past 30 days and (re)examined the patient.  Any updates or changes from those notes are reflected in this note.    Clinically Significant Risk Factors Present on Admission

## 2025-07-16 ENCOUNTER — HOSPITAL ENCOUNTER (OUTPATIENT)
Facility: CLINIC | Age: 35
Discharge: HOME | End: 2025-07-16
Attending: DENTIST | Admitting: DENTIST
Payer: COMMERCIAL

## 2025-07-16 ENCOUNTER — ANESTHESIA (OUTPATIENT)
Dept: SURGERY | Facility: CLINIC | Age: 35
End: 2025-07-16
Payer: COMMERCIAL

## 2025-07-16 VITALS
OXYGEN SATURATION: 95 % | RESPIRATION RATE: 16 BRPM | SYSTOLIC BLOOD PRESSURE: 125 MMHG | BODY MASS INDEX: 30.49 KG/M2 | HEART RATE: 93 BPM | HEIGHT: 65 IN | TEMPERATURE: 99.3 F | DIASTOLIC BLOOD PRESSURE: 83 MMHG | WEIGHT: 182.98 LBS

## 2025-07-16 DIAGNOSIS — M26.609 TMJ (TEMPOROMANDIBULAR JOINT DISORDER): Primary | ICD-10-CM

## 2025-07-16 LAB — GLUCOSE BLDC GLUCOMTR-MCNC: 91 MG/DL (ref 70–99)

## 2025-07-16 PROCEDURE — 250N000013 HC RX MED GY IP 250 OP 250 PS 637

## 2025-07-16 PROCEDURE — 250N000011 HC RX IP 250 OP 636

## 2025-07-16 PROCEDURE — 258N000003 HC RX IP 258 OP 636

## 2025-07-16 PROCEDURE — 999N000141 HC STATISTIC PRE-PROCEDURE NURSING ASSESSMENT: Performed by: DENTIST

## 2025-07-16 PROCEDURE — 370N000017 HC ANESTHESIA TECHNICAL FEE, PER MIN: Performed by: DENTIST

## 2025-07-16 PROCEDURE — 250N000025 HC SEVOFLURANE, PER MIN: Performed by: DENTIST

## 2025-07-16 PROCEDURE — 250N000009 HC RX 250

## 2025-07-16 PROCEDURE — 710N000010 HC RECOVERY PHASE 1, LEVEL 2, PER MIN: Performed by: DENTIST

## 2025-07-16 PROCEDURE — 360N000076 HC SURGERY LEVEL 3, PER MIN: Performed by: DENTIST

## 2025-07-16 PROCEDURE — 272N000001 HC OR GENERAL SUPPLY STERILE: Performed by: DENTIST

## 2025-07-16 PROCEDURE — 258N000003 HC RX IP 258 OP 636: Performed by: DENTIST

## 2025-07-16 PROCEDURE — 250N000009 HC RX 250: Performed by: DENTIST

## 2025-07-16 PROCEDURE — 250N000011 HC RX IP 250 OP 636: Performed by: DENTIST

## 2025-07-16 PROCEDURE — 710N000012 HC RECOVERY PHASE 2, PER MINUTE: Performed by: DENTIST

## 2025-07-16 PROCEDURE — 82962 GLUCOSE BLOOD TEST: CPT

## 2025-07-16 RX ORDER — CEFAZOLIN SODIUM/WATER 2 G/20 ML
2 SYRINGE (ML) INTRAVENOUS SEE ADMIN INSTRUCTIONS
Status: DISCONTINUED | OUTPATIENT
Start: 2025-07-16 | End: 2025-07-16 | Stop reason: HOSPADM

## 2025-07-16 RX ORDER — CEFAZOLIN SODIUM/WATER 2 G/20 ML
2 SYRINGE (ML) INTRAVENOUS
Status: COMPLETED | OUTPATIENT
Start: 2025-07-16 | End: 2025-07-16

## 2025-07-16 RX ORDER — OXYCODONE HYDROCHLORIDE 5 MG/1
5 TABLET ORAL EVERY 6 HOURS PRN
Qty: 12 TABLET | Refills: 0 | Status: CANCELLED | OUTPATIENT
Start: 2025-07-16 | End: 2025-07-19

## 2025-07-16 RX ORDER — LIDOCAINE 40 MG/G
CREAM TOPICAL
Status: DISCONTINUED | OUTPATIENT
Start: 2025-07-16 | End: 2025-07-16 | Stop reason: HOSPADM

## 2025-07-16 RX ORDER — HYDROMORPHONE HCL IN WATER/PF 6 MG/30 ML
0.2 PATIENT CONTROLLED ANALGESIA SYRINGE INTRAVENOUS EVERY 5 MIN PRN
Status: DISCONTINUED | OUTPATIENT
Start: 2025-07-16 | End: 2025-07-16 | Stop reason: HOSPADM

## 2025-07-16 RX ORDER — HYDROMORPHONE HCL IN WATER/PF 6 MG/30 ML
0.4 PATIENT CONTROLLED ANALGESIA SYRINGE INTRAVENOUS EVERY 5 MIN PRN
Status: DISCONTINUED | OUTPATIENT
Start: 2025-07-16 | End: 2025-07-16 | Stop reason: HOSPADM

## 2025-07-16 RX ORDER — CHLORHEXIDINE GLUCONATE ORAL RINSE 1.2 MG/ML
10 SOLUTION DENTAL ONCE
Status: COMPLETED | OUTPATIENT
Start: 2025-07-16 | End: 2025-07-16

## 2025-07-16 RX ORDER — ONDANSETRON 2 MG/ML
INJECTION INTRAMUSCULAR; INTRAVENOUS PRN
Status: DISCONTINUED | OUTPATIENT
Start: 2025-07-16 | End: 2025-07-16

## 2025-07-16 RX ORDER — SODIUM CHLORIDE, SODIUM LACTATE, POTASSIUM CHLORIDE, CALCIUM CHLORIDE 600; 310; 30; 20 MG/100ML; MG/100ML; MG/100ML; MG/100ML
INJECTION, SOLUTION INTRAVENOUS CONTINUOUS
Status: DISCONTINUED | OUTPATIENT
Start: 2025-07-16 | End: 2025-07-16 | Stop reason: HOSPADM

## 2025-07-16 RX ORDER — ONDANSETRON 2 MG/ML
4 INJECTION INTRAMUSCULAR; INTRAVENOUS EVERY 30 MIN PRN
Status: DISCONTINUED | OUTPATIENT
Start: 2025-07-16 | End: 2025-07-16 | Stop reason: HOSPADM

## 2025-07-16 RX ORDER — ONDANSETRON 4 MG/1
4 TABLET, ORALLY DISINTEGRATING ORAL EVERY 30 MIN PRN
Status: DISCONTINUED | OUTPATIENT
Start: 2025-07-16 | End: 2025-07-16 | Stop reason: HOSPADM

## 2025-07-16 RX ORDER — ACETAMINOPHEN 325 MG/1
650 TABLET ORAL EVERY 6 HOURS PRN
Qty: 50 TABLET | Refills: 0 | Status: SHIPPED | OUTPATIENT
Start: 2025-07-16

## 2025-07-16 RX ORDER — OXYCODONE HYDROCHLORIDE 5 MG/1
5 TABLET ORAL
Status: COMPLETED | OUTPATIENT
Start: 2025-07-16 | End: 2025-07-16

## 2025-07-16 RX ORDER — ONDANSETRON 4 MG/1
4 TABLET, ORALLY DISINTEGRATING ORAL EVERY 8 HOURS PRN
Qty: 4 TABLET | Refills: 0 | Status: SHIPPED | OUTPATIENT
Start: 2025-07-16

## 2025-07-16 RX ORDER — OXYCODONE HYDROCHLORIDE 10 MG/1
10 TABLET ORAL
Status: DISCONTINUED | OUTPATIENT
Start: 2025-07-16 | End: 2025-07-16 | Stop reason: HOSPADM

## 2025-07-16 RX ORDER — NALOXONE HYDROCHLORIDE 0.4 MG/ML
0.1 INJECTION, SOLUTION INTRAMUSCULAR; INTRAVENOUS; SUBCUTANEOUS
Status: DISCONTINUED | OUTPATIENT
Start: 2025-07-16 | End: 2025-07-16 | Stop reason: HOSPADM

## 2025-07-16 RX ORDER — IBUPROFEN 800 MG/1
800 TABLET, FILM COATED ORAL EVERY 8 HOURS PRN
Qty: 21 TABLET | Refills: 0 | Status: SHIPPED | OUTPATIENT
Start: 2025-07-16

## 2025-07-16 RX ORDER — DEXAMETHASONE SODIUM PHOSPHATE 4 MG/ML
INJECTION, SOLUTION INTRA-ARTICULAR; INTRALESIONAL; INTRAMUSCULAR; INTRAVENOUS; SOFT TISSUE PRN
Status: DISCONTINUED | OUTPATIENT
Start: 2025-07-16 | End: 2025-07-16

## 2025-07-16 RX ORDER — ACETAMINOPHEN 325 MG/1
975 TABLET ORAL ONCE
Status: DISCONTINUED | OUTPATIENT
Start: 2025-07-16 | End: 2025-07-16 | Stop reason: HOSPADM

## 2025-07-16 RX ORDER — FENTANYL CITRATE 50 UG/ML
50 INJECTION, SOLUTION INTRAMUSCULAR; INTRAVENOUS EVERY 5 MIN PRN
Status: DISCONTINUED | OUTPATIENT
Start: 2025-07-16 | End: 2025-07-16 | Stop reason: HOSPADM

## 2025-07-16 RX ORDER — FENTANYL CITRATE 50 UG/ML
INJECTION, SOLUTION INTRAMUSCULAR; INTRAVENOUS PRN
Status: DISCONTINUED | OUTPATIENT
Start: 2025-07-16 | End: 2025-07-16

## 2025-07-16 RX ORDER — METHYLPREDNISOLONE 4 MG/1
TABLET ORAL
Qty: 21 TABLET | Refills: 0 | Status: SHIPPED | OUTPATIENT
Start: 2025-07-16

## 2025-07-16 RX ORDER — ACETAMINOPHEN 325 MG/1
975 TABLET ORAL ONCE
Status: COMPLETED | OUTPATIENT
Start: 2025-07-16 | End: 2025-07-16

## 2025-07-16 RX ORDER — GLYCOPYRROLATE 0.2 MG/ML
INJECTION, SOLUTION INTRAMUSCULAR; INTRAVENOUS PRN
Status: DISCONTINUED | OUTPATIENT
Start: 2025-07-16 | End: 2025-07-16

## 2025-07-16 RX ORDER — SODIUM CHLORIDE, SODIUM LACTATE, POTASSIUM CHLORIDE, CALCIUM CHLORIDE 600; 310; 30; 20 MG/100ML; MG/100ML; MG/100ML; MG/100ML
INJECTION, SOLUTION INTRAVENOUS CONTINUOUS PRN
Status: DISCONTINUED | OUTPATIENT
Start: 2025-07-16 | End: 2025-07-16

## 2025-07-16 RX ORDER — LIDOCAINE HYDROCHLORIDE 20 MG/ML
INJECTION, SOLUTION INFILTRATION; PERINEURAL PRN
Status: DISCONTINUED | OUTPATIENT
Start: 2025-07-16 | End: 2025-07-16

## 2025-07-16 RX ORDER — DEXAMETHASONE SODIUM PHOSPHATE 4 MG/ML
4 INJECTION, SOLUTION INTRA-ARTICULAR; INTRALESIONAL; INTRAMUSCULAR; INTRAVENOUS; SOFT TISSUE
Status: DISCONTINUED | OUTPATIENT
Start: 2025-07-16 | End: 2025-07-16 | Stop reason: HOSPADM

## 2025-07-16 RX ORDER — PROPOFOL 10 MG/ML
INJECTION, EMULSION INTRAVENOUS CONTINUOUS PRN
Status: DISCONTINUED | OUTPATIENT
Start: 2025-07-16 | End: 2025-07-16

## 2025-07-16 RX ORDER — FENTANYL CITRATE 50 UG/ML
25 INJECTION, SOLUTION INTRAMUSCULAR; INTRAVENOUS EVERY 5 MIN PRN
Status: DISCONTINUED | OUTPATIENT
Start: 2025-07-16 | End: 2025-07-16 | Stop reason: HOSPADM

## 2025-07-16 RX ADMIN — LIDOCAINE HYDROCHLORIDE 80 MG: 20 INJECTION, SOLUTION INFILTRATION; PERINEURAL at 12:12

## 2025-07-16 RX ADMIN — PHENYLEPHRINE HYDROCHLORIDE 100 MCG: 10 INJECTION INTRAVENOUS at 12:36

## 2025-07-16 RX ADMIN — CHLORHEXIDINE GLUCONATE 10 ML: 1.2 SOLUTION ORAL at 10:57

## 2025-07-16 RX ADMIN — SODIUM CHLORIDE, SODIUM LACTATE, POTASSIUM CHLORIDE, AND CALCIUM CHLORIDE: .6; .31; .03; .02 INJECTION, SOLUTION INTRAVENOUS at 12:07

## 2025-07-16 RX ADMIN — MIDAZOLAM 1 MG: 1 INJECTION INTRAMUSCULAR; INTRAVENOUS at 12:09

## 2025-07-16 RX ADMIN — FENTANYL CITRATE 100 MCG: 50 INJECTION INTRAMUSCULAR; INTRAVENOUS at 13:26

## 2025-07-16 RX ADMIN — ONDANSETRON 4 MG: 2 INJECTION INTRAMUSCULAR; INTRAVENOUS at 13:31

## 2025-07-16 RX ADMIN — PROPOFOL 100 MCG/KG/MIN: 10 INJECTION, EMULSION INTRAVENOUS at 12:21

## 2025-07-16 RX ADMIN — PROPOFOL 200 MG: 10 INJECTION, EMULSION INTRAVENOUS at 12:12

## 2025-07-16 RX ADMIN — Medication 2 G: at 12:22

## 2025-07-16 RX ADMIN — MIDAZOLAM 1 MG: 1 INJECTION INTRAMUSCULAR; INTRAVENOUS at 12:03

## 2025-07-16 RX ADMIN — DEXAMETHASONE SODIUM PHOSPHATE 10 MG: 4 INJECTION, SOLUTION INTRAMUSCULAR; INTRAVENOUS at 12:12

## 2025-07-16 RX ADMIN — Medication 50 MG: at 12:12

## 2025-07-16 RX ADMIN — FENTANYL CITRATE 100 MCG: 50 INJECTION INTRAMUSCULAR; INTRAVENOUS at 12:12

## 2025-07-16 RX ADMIN — PHENYLEPHRINE HYDROCHLORIDE 100 MCG: 10 INJECTION INTRAVENOUS at 12:34

## 2025-07-16 RX ADMIN — HYDROMORPHONE HYDROCHLORIDE 0.5 MG: 1 INJECTION, SOLUTION INTRAMUSCULAR; INTRAVENOUS; SUBCUTANEOUS at 12:21

## 2025-07-16 RX ADMIN — FENTANYL CITRATE 25 MCG: 50 INJECTION, SOLUTION INTRAMUSCULAR; INTRAVENOUS at 14:31

## 2025-07-16 RX ADMIN — OXYCODONE HYDROCHLORIDE 5 MG: 5 TABLET ORAL at 14:42

## 2025-07-16 RX ADMIN — GLYCOPYRROLATE 0.2 MG: 0.2 INJECTION, SOLUTION INTRAMUSCULAR; INTRAVENOUS at 12:12

## 2025-07-16 RX ADMIN — ACETAMINOPHEN 975 MG: 325 TABLET ORAL at 10:56

## 2025-07-16 RX ADMIN — Medication 100 MG: at 13:32

## 2025-07-16 RX ADMIN — FENTANYL CITRATE 25 MCG: 50 INJECTION, SOLUTION INTRAMUSCULAR; INTRAVENOUS at 14:25

## 2025-07-16 ASSESSMENT — ACTIVITIES OF DAILY LIVING (ADL)
ADLS_ACUITY_SCORE: 36

## 2025-07-16 NOTE — BRIEF OP NOTE
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  Postop MAURICIO 38mm.  Complications: None.  Implants: * No implants in log *

## 2025-07-16 NOTE — ANESTHESIA PROCEDURE NOTES
Airway       Patient location during procedure: OR       Procedure Start/Stop Times: 7/16/2025 12:16 PM  Staff -        Anesthesiologist:  Grayson Ann MD       Resident/Fellow: Georgia Hernandez MD       Performed By: residentIndications and Patient Condition       Indications for airway management: jessica-procedural       Induction type:intravenous       Mask difficulty assessment: 1 - vent by mask    Final Airway Details       Final airway type: endotracheal airway       Successful airway: Oral, Nasal and VANESSA  Endotracheal Airway Details        ETT size (mm): 6.5       Cuffed: yes       Successful intubation technique: video laryngoscopy       VL Blade Size: MAC 4       Grade View of Cords: 1       Adjucts: stylet       Position: Right       Measured from: nares       Secured at (cm): 27       Bite block used: None    Post intubation assessment        Placement verified by: capnometry and chest rise        Number of attempts at approach: 1       Number of other approaches attempted: 0       Secured with: tape       Ease of procedure: easy       Dentition: Intact and Unchanged    Medication(s) Administered   Medication Administration Time: 7/16/2025 12:16 PM

## 2025-07-16 NOTE — DISCHARGE INSTRUCTIONS
What can I expect after surgery?  ? Following surgery, you will spend a brief period of time in the recovery room. Once your have recovered from the anesthesia, you will be discharged with the appropriate prescriptions and home care instructions below. You must arrive with a responsible adult who will drive you home and care for you after the surgery.  ? Swelling is normal after surgery and may take 5-7 days to improve.  ? You may experience a temporary change in your bite. If so, this will  usually resolve in 5-7 days.  ? You may notice clicking in your jaw joint that was not present before.  This may be normal and is not a cause for concern.  ? You may have numbness and possible temporary paralysis of the face on  the side of the surgery. This is usually caused by local anesthetic (numbing medication) administered for your comfort and should only last for a few hours.  What are my post-operative instructions?  ? Apply ice packs to the affected side of the face for the first 12-24 hours.  ? While in bed, keep your head elevated with 2-3 pillows to minimize  swelling.  ? Resorbable sutures (aka. Stitches) are placed over your incisions. These  stitches will dissolve so there is no need to remove any stitches.  ? You may shower anytime. To dry, lightly pat your face with a towel  instead of rubbing it.  ? After the first 48 hours, you may apply warm heat (ie. heating pads or  microwave a wet cloth) to the jaw muscles and temples as needed for  Comfort.    What should my activity level be?  ? Avoid any strenuous physical activity for 1 week.  ? You may return to school or light duty work (non-physical labor) within a  week of the surgery or as tolerated per your symptoms.    What should my activity level be?  Begin jaw-stretching exercises as soon as possible after surgery:  1.Manual jaw stretch: Pry the upper and lower teeth apart using your index finger on the lower teeth and your thumb on the upper teeth. Scissor  or spread the teeth apart to stretch your jaw until you feel some tightness and hold it for 2-3 seconds. Repeat this maneuver 20 times in the morning, afternoon, and evening, for a total of 60 times daily.  2. It may help to apply warm heat over your jaw muscles before and after these stretching exercises.  3. Since these jaw-stretching exercises are likely to cause some discomfort, you may take analgesics (pain killers) one hour prior.    Are there diet restrictions after surgery?  ? A soft diet is recommended for 1-2 weeks after your surgery (e.g. yogurt, eggs, mashed potatoes, overcooked pasta, etc.).  ? Avoid hard, crunchy or chewy foods for 1-2 weeks.  ? Drink plenty of water and other liquids to stay well hydrated.    How do I manage pain after surgery?  Your doctors will prescribe analgesics (pain killers) based on your health history and current medications. Please take them as prescribed. The most common medications prescribed after TMJ arthroscopy include:  ? Anti-inflammatory medications   ? Muscle relaxants  Note: Our goal is to tailor the treatment to your specific needs. Your doctor may prescribe other medications as needed.    What follow-up care will I receive?  Your doctor s office will call you to schedule a follow-up appointment. Usually this appointment will take place 2-3 weeks after surgery.    When should I call my doctor?  ? If you have difficulty breathing  ? If you have increased swelling 2-3 days after surgery  ? If you have difficulty closing your eyes after surgery  ? If you are unable to urinate  ? If you have severe pain that is not relieved by medications  ? If you experience a rash, nausea, vomiting, severe headache, severe  constipation, or other unexpected reactions  ? If you have an oral temperature over 100.5 degrees.  ? If you have a question or concern that must be addressed prior to the  follow-up visit.    Who should I call if I have questions?    To contact a doctor, call   [facility] at [phone] or:  [phone] and ask for the resident on call for Oral Maxillofacial  (answered 24 hours a day)   Emergency Department:  [place]: [phone]  [facility]: [phone]  91 if you are in need of immediate or emergent help

## 2025-07-16 NOTE — PROGRESS NOTES
"Paged Alfredo Ruiz \"Seth Null. [address]. I have some questions about discharge instructions. please call [phone] or sigrid Oliver\"  "

## 2025-07-16 NOTE — OP NOTE
Oral & Maxillofacial Surgery   Operative Note      Procedure:   Right temporomandibular joint arthroscopy with lysis and lavage with Healon and PRP injection.      Pre-Operative Diagnosis:   TMJ internal derangement, limited MAURICIO     Post-Operative Diagnosis:  TMJ internal derangement, limited MAURICIO    Surgeon:  Erica Cates DDS, FACS    Resident Surgeon:  Ian Cordova DMD    Resident Assistants  Alfredo Ruiz DDS    Other surgical staff (if any):  Circulator: Sole Aguilar RN  Relief Circulator: Héctor Ellsworth RN  Relief Scrub: Javan Craig  Scrub Person: Jr Jones    Anesthesia  Anesthesiologist: Grayson Ann MD  CRNA: Natasha Cash APRN CRNA  Anesthesia Resident: Roxana Arteaga DO    EBL: 22 mL    Drains: None    Prosthetic Devices:   * No implants in log *    Specimen:   * No specimens in log *    Complications: none    Indications for Surgery:   Seth Null is a 34 female who presents with internal derangement of right temporo-mandibular joint with anteriorly displaced disc without reduction, right TMJ arthralgia with indication for arthroscopy of the R TMJ. The procedure, benefits, risks, alternatives including no treatment were discussed in detail with the patient and the patient elected to proceed with the planned surgery.     Procedure Description:   On the day of surgery, the patient was seen by myself and Dr. Cates in the pre-op holding area.  The procedure, benefits, risks, alternatives including no treatment were discussed again with the patient and an informed written consent was obtained for the planned procedure.  Patient was transported to the operating room and transferred to the OR table in a supine position.  Airway was secured via nasal tube by the anesthesia team and secured to the soft tissue nasal septum with a 2-0 Ethibond suture by the OMFS team. The head and neck were scrubbed and painted with iodine. Surgeons stepped out to scrub and returned  to don sterile gown and gloves. A surgical timeout was performed by all members of the team.    Attention was directed to the right TMJ: The surgical site was prepped and draped.  1 mL of 1% lidocaine plain was injected into the TMJ capsule.  A 15-blade was then used for a small stab incision through the skin in the right pre-auricular area. A sharp trocar was inserted through skin and subcutaneous tissue into the joint capsule. The sharp trocar was removed. The joint was irrigated with heparinized saline. A camera was introduced into the joint. Photographs were obtained. The joint demonstrated fibrillations and vascular markings. The joint was rinsed with 400 mL of heparinized lactate ringer solution. Injected 0.85 mL of Healon and 1 ml of PRP into the joint prior to removal of the trocar. The mandible was manipulated and stretch. The range of motion of the mandible was recorded at 38 mm. Placed bandage over trocar site following its removal. Closed the incision with 5-0 fast gut.     The patient's oral cavity was suctioned. The suture securing the ETT to the nasal septum was cut. An mahesh-gastric tube was passed to decompress gastric contents. The patient was turned over to the Anesthesia Service and was awakened in the OR and transferred stable to the PACU. A jaw bra with ice was placed around patient's head.      The attending staff, Dr. Cates was present for the entirety of the procedure.     Ian Cordova  [facility] resident PGY-4  Pager: [phone]

## 2025-07-21 ENCOUNTER — TRANSFERRED RECORDS (OUTPATIENT)
Dept: HEALTH INFORMATION MANAGEMENT | Facility: CLINIC | Age: 35
End: 2025-07-21
Payer: COMMERCIAL

## 2025-08-03 ENCOUNTER — HEALTH MAINTENANCE LETTER (OUTPATIENT)
Age: 35
End: 2025-08-03

## (undated) DEVICE — ESU GROUND PAD ADULT W/CORD E7507

## (undated) DEVICE — CATH TRAY FOLEY SURESTEP 16FR DRAIN BAG STATOCK A899916

## (undated) DEVICE — TUBING PRESSURE TRANSDUCER MALE TO FEMALE LL 72" 50P172

## (undated) DEVICE — GLOVE PROTEXIS BLUE W/NEU-THERA 7.0  2D73EB70

## (undated) DEVICE — STPL SKIN SUBCUTICULAR INSORB  2030

## (undated) DEVICE — GLOVE PROTEXIS W/NEU-THERA 7.5  2D73TE75

## (undated) DEVICE — SOL NACL 0.9% IRRIG 1000ML BOTTLE 2F7124

## (undated) DEVICE — LINEN TOWEL PACK X10 5473

## (undated) DEVICE — PAD CHUX UNDERPAD 30X36" P3036C

## (undated) DEVICE — SUCTION CANISTER MEDIVAC LINER 3000ML W/LID 65651-530

## (undated) DEVICE — LINEN HALF SHEET 5512

## (undated) DEVICE — GLOVE PROTEXIS BLUE W/NEU-THERA 6.0  2D73EB60

## (undated) DEVICE — PACK SET-UP STD 9102

## (undated) DEVICE — BLADE KNIFE SURG 10 371110

## (undated) DEVICE — STOCKING SLEEVE VASOPRESS COMPRESSION CALF MED 18" VP501M

## (undated) DEVICE — SU VICRYL 0 CT-1 36" J346H

## (undated) DEVICE — TUBING SUCTION 10'X3/16" N510

## (undated) DEVICE — LINEN DRAPE 54X72" 5467

## (undated) DEVICE — BAG CLEAR TRASH 1.3M 39X33" P4040C

## (undated) DEVICE — LINEN BABY BLANKET 5434

## (undated) DEVICE — CAP BABY PINK/BLUE IC-2

## (undated) DEVICE — TUBING IV 69" STERILE 1C8160S

## (undated) DEVICE — GOWN IMPERVIOUS SPECIALTY XLG/XLONG 32474

## (undated) DEVICE — TRANSFER DEVICE BLOOD NDL HOLDER 364880

## (undated) DEVICE — PACK C-SECTION LF PL15OTA83B

## (undated) DEVICE — SOL RINGERS LACTATED 1000ML BAG 07953-09

## (undated) DEVICE — LINEN FULL SHEET 5511

## (undated) DEVICE — CONNECTOR STOPCOCK 3 WAY MALE LL HI-FLO MX9311L

## (undated) DEVICE — SYR 50ML LL W/O NDL 309653

## (undated) DEVICE — LINEN TOWEL PACK X5 5464

## (undated) DEVICE — Device

## (undated) DEVICE — BLADE CLIPPER SGL USE 9680

## (undated) DEVICE — GLOVE PROTEXIS BLUE W/NEU-THERA 6.5  2D73EB65

## (undated) DEVICE — SOL WATER IRRIG 1000ML BOTTLE 2F7114

## (undated) DEVICE — SOL ADH LIQUID BENZOIN SWAB 0.6ML C1544

## (undated) DEVICE — GLOVE PROTEXIS W/NEU-THERA 6.0  2D73TE60

## (undated) DEVICE — SU MONOCRYL 4-0 PS-2 27" UND Y426H

## (undated) DEVICE — PREP CHLORAPREP 26ML TINTED HI-LITE ORANGE 930815

## (undated) DEVICE — LABEL MEDICATION SYSTEM 3303-P

## (undated) DEVICE — TAPE CLOTH ADHESIVE 3" LATEX 3554C

## (undated) DEVICE — DRAPE STERI APERATURE 51X33" 1030

## (undated) DEVICE — SPONGE COTTON BALL 3/4" W/STRING 30-03

## (undated) DEVICE — GLOVE PROTEXIS MICRO 6.5  2D73PM65

## (undated) DEVICE — NEPTUNE SMOKE EVACUATION PENCIL

## (undated) DEVICE — LINEN TOWEL PACK X6 WHITE 5487

## (undated) DEVICE — GLOVE PROTEXIS MICRO 6.0  2D73PM60

## (undated) DEVICE — SU VICRYL 2-0 CT-1 36" UND J945H

## (undated) DEVICE — NDL COUNTER 20CT 31142493

## (undated) DEVICE — SU MONOCRYL 0 CT-1 36" UND Y946H

## (undated) DEVICE — DRSG STERI STRIP 1/2X4" R1547

## (undated) DEVICE — SUCTION MANIFOLD NEPTUNE 2 SYS 4 PORT 0702-020-000

## (undated) DEVICE — SU VICRYL 2-0 CT-1 36" J345H

## (undated) DEVICE — GLOVE PROTEXIS W/NEU-THERA 7.0  2D73TE70

## (undated) DEVICE — PACK GOWN 3/PK DISP XL SBA32GPFCB

## (undated) DEVICE — GLOVE PROTEXIS MICRO 6.5 LT BLUE 2D73PM65

## (undated) DEVICE — GLOVE PROTEXIS POWDER FREE SMT 6.0  2D72PT60X

## (undated) DEVICE — SOLIDIFIER FLUID RED 1500ML LIQUID KIT SYS POWDER ISOB1500

## (undated) DEVICE — DRSG BANDAID 1X3" FABRIC CURITY LATEX FREE KC44101

## (undated) DEVICE — SYR 10ML FINGER CONTROL W/O NDL 309695

## (undated) DEVICE — PREP CHLORAPREP 26ML TINTED ORANGE  260815

## (undated) RX ORDER — GLYCOPYRROLATE 0.2 MG/ML
INJECTION INTRAMUSCULAR; INTRAVENOUS
Status: DISPENSED
Start: 2021-04-13

## (undated) RX ORDER — DEXAMETHASONE SODIUM PHOSPHATE 4 MG/ML
INJECTION, SOLUTION INTRA-ARTICULAR; INTRALESIONAL; INTRAMUSCULAR; INTRAVENOUS; SOFT TISSUE
Status: DISPENSED
Start: 2019-06-13

## (undated) RX ORDER — ACETAMINOPHEN 325 MG/1
TABLET ORAL
Status: DISPENSED
Start: 2025-07-16

## (undated) RX ORDER — LIDOCAINE HYDROCHLORIDE 10 MG/ML
INJECTION, SOLUTION EPIDURAL; INFILTRATION; INTRACAUDAL; PERINEURAL
Status: DISPENSED
Start: 2021-04-13

## (undated) RX ORDER — FENTANYL CITRATE 50 UG/ML
INJECTION, SOLUTION INTRAMUSCULAR; INTRAVENOUS
Status: DISPENSED
Start: 2025-07-16

## (undated) RX ORDER — OXYTOCIN/0.9 % SODIUM CHLORIDE 30/500 ML
PLASTIC BAG, INJECTION (ML) INTRAVENOUS
Status: DISPENSED
Start: 2019-06-13

## (undated) RX ORDER — FENTANYL CITRATE 50 UG/ML
INJECTION, SOLUTION INTRAMUSCULAR; INTRAVENOUS
Status: DISPENSED
Start: 2019-06-13

## (undated) RX ORDER — DEXAMETHASONE SODIUM PHOSPHATE 4 MG/ML
INJECTION, SOLUTION INTRA-ARTICULAR; INTRALESIONAL; INTRAMUSCULAR; INTRAVENOUS; SOFT TISSUE
Status: DISPENSED
Start: 2021-04-13

## (undated) RX ORDER — OXYTOCIN/0.9 % SODIUM CHLORIDE 30/500 ML
PLASTIC BAG, INJECTION (ML) INTRAVENOUS
Status: DISPENSED
Start: 2021-04-13

## (undated) RX ORDER — CHLORHEXIDINE GLUCONATE ORAL RINSE 1.2 MG/ML
SOLUTION DENTAL
Status: DISPENSED
Start: 2025-07-16

## (undated) RX ORDER — ONDANSETRON 2 MG/ML
INJECTION INTRAMUSCULAR; INTRAVENOUS
Status: DISPENSED
Start: 2025-07-16

## (undated) RX ORDER — ONDANSETRON 2 MG/ML
INJECTION INTRAMUSCULAR; INTRAVENOUS
Status: DISPENSED
Start: 2021-04-13

## (undated) RX ORDER — ESMOLOL HYDROCHLORIDE 10 MG/ML
INJECTION INTRAVENOUS
Status: DISPENSED
Start: 2025-07-16

## (undated) RX ORDER — ONDANSETRON 2 MG/ML
INJECTION INTRAMUSCULAR; INTRAVENOUS
Status: DISPENSED
Start: 2019-06-13

## (undated) RX ORDER — FENTANYL CITRATE-0.9 % NACL/PF 10 MCG/ML
PLASTIC BAG, INJECTION (ML) INTRAVENOUS
Status: DISPENSED
Start: 2021-04-13

## (undated) RX ORDER — LIDOCAINE HYDROCHLORIDE 10 MG/ML
INJECTION, SOLUTION EPIDURAL; INFILTRATION; INTRACAUDAL; PERINEURAL
Status: DISPENSED
Start: 2025-07-16

## (undated) RX ORDER — KETOROLAC TROMETHAMINE 30 MG/ML
INJECTION, SOLUTION INTRAMUSCULAR; INTRAVENOUS
Status: DISPENSED
Start: 2019-06-13

## (undated) RX ORDER — PROPOFOL 10 MG/ML
INJECTION, EMULSION INTRAVENOUS
Status: DISPENSED
Start: 2019-06-13

## (undated) RX ORDER — MORPHINE SULFATE 1 MG/ML
INJECTION, SOLUTION EPIDURAL; INTRATHECAL; INTRAVENOUS
Status: DISPENSED
Start: 2021-04-13

## (undated) RX ORDER — LIDOCAINE HYDROCHLORIDE 10 MG/ML
INJECTION, SOLUTION EPIDURAL; INFILTRATION; INTRACAUDAL; PERINEURAL
Status: DISPENSED
Start: 2019-06-13

## (undated) RX ORDER — HYDROMORPHONE HYDROCHLORIDE 1 MG/ML
INJECTION, SOLUTION INTRAMUSCULAR; INTRAVENOUS; SUBCUTANEOUS
Status: DISPENSED
Start: 2025-07-16

## (undated) RX ORDER — HYDROMORPHONE HYDROCHLORIDE 1 MG/ML
INJECTION, SOLUTION INTRAMUSCULAR; INTRAVENOUS; SUBCUTANEOUS
Status: DISPENSED
Start: 2019-06-13

## (undated) RX ORDER — HEPARIN SODIUM 5000 [USP'U]/.5ML
INJECTION, SOLUTION INTRAVENOUS; SUBCUTANEOUS
Status: DISPENSED
Start: 2025-07-16

## (undated) RX ORDER — KETOROLAC TROMETHAMINE 30 MG/ML
INJECTION, SOLUTION INTRAMUSCULAR; INTRAVENOUS
Status: DISPENSED
Start: 2021-04-13

## (undated) RX ORDER — OXYCODONE HYDROCHLORIDE 5 MG/1
TABLET ORAL
Status: DISPENSED
Start: 2025-07-16

## (undated) RX ORDER — CEFAZOLIN SODIUM/WATER 2 G/20 ML
SYRINGE (ML) INTRAVENOUS
Status: DISPENSED
Start: 2025-07-16

## (undated) RX ORDER — GLYCOPYRROLATE 0.2 MG/ML
INJECTION INTRAMUSCULAR; INTRAVENOUS
Status: DISPENSED
Start: 2019-06-13